# Patient Record
Sex: FEMALE | Race: WHITE
[De-identification: names, ages, dates, MRNs, and addresses within clinical notes are randomized per-mention and may not be internally consistent; named-entity substitution may affect disease eponyms.]

---

## 2017-06-03 ENCOUNTER — HEALTH MAINTENANCE LETTER (OUTPATIENT)
Age: 47
End: 2017-06-03

## 2019-09-29 ENCOUNTER — HEALTH MAINTENANCE LETTER (OUTPATIENT)
Age: 49
End: 2019-09-29

## 2021-01-14 ENCOUNTER — HEALTH MAINTENANCE LETTER (OUTPATIENT)
Age: 51
End: 2021-01-14

## 2021-03-14 ENCOUNTER — HEALTH MAINTENANCE LETTER (OUTPATIENT)
Age: 51
End: 2021-03-14

## 2021-05-10 ENCOUNTER — HOSPITAL ENCOUNTER (EMERGENCY)
Facility: CLINIC | Age: 51
Discharge: HOME | End: 2021-05-10
Payer: COMMERCIAL

## 2021-05-10 VITALS
SYSTOLIC BLOOD PRESSURE: 182 MMHG | OXYGEN SATURATION: 98 % | TEMPERATURE: 98.2 F | WEIGHT: 200 LBS | RESPIRATION RATE: 18 BRPM | HEART RATE: 66 BPM | BODY MASS INDEX: 35.44 KG/M2 | HEIGHT: 63 IN | DIASTOLIC BLOOD PRESSURE: 84 MMHG

## 2021-05-10 ASSESSMENT — MIFFLIN-ST. JEOR: SCORE: 1486.32

## 2021-05-10 NOTE — ED TRIAGE NOTES
Hx of migraines and hardware in neck from previous surgery. Now having severe migraine and neck pain, took Imitrex and Zofran without relief.

## 2021-08-29 ENCOUNTER — HEALTH MAINTENANCE LETTER (OUTPATIENT)
Age: 51
End: 2021-08-29

## 2021-10-24 ENCOUNTER — HEALTH MAINTENANCE LETTER (OUTPATIENT)
Age: 51
End: 2021-10-24

## 2022-01-30 ENCOUNTER — MYC REFILL (OUTPATIENT)
Dept: FAMILY MEDICINE | Facility: CLINIC | Age: 52
End: 2022-01-30
Payer: COMMERCIAL

## 2022-01-30 DIAGNOSIS — B96.89 BACTERIAL VAGINOSIS: ICD-10-CM

## 2022-01-30 DIAGNOSIS — N76.0 BACTERIAL VAGINOSIS: ICD-10-CM

## 2022-01-31 RX ORDER — METRONIDAZOLE 500 MG/1
500 TABLET ORAL 3 TIMES DAILY
Qty: 21 TABLET | Refills: 0 | OUTPATIENT
Start: 2022-01-31

## 2022-01-31 RX ORDER — METRONIDAZOLE 7.5 MG/G
GEL VAGINAL
Qty: 70 G | Refills: 0 | OUTPATIENT
Start: 2022-01-31

## 2022-02-13 ENCOUNTER — HEALTH MAINTENANCE LETTER (OUTPATIENT)
Age: 52
End: 2022-02-13

## 2022-04-10 ENCOUNTER — HEALTH MAINTENANCE LETTER (OUTPATIENT)
Age: 52
End: 2022-04-10

## 2022-10-15 ENCOUNTER — HEALTH MAINTENANCE LETTER (OUTPATIENT)
Age: 52
End: 2022-10-15

## 2023-03-26 ENCOUNTER — HEALTH MAINTENANCE LETTER (OUTPATIENT)
Age: 53
End: 2023-03-26

## 2023-03-29 ENCOUNTER — MYC REFILL (OUTPATIENT)
Dept: FAMILY MEDICINE | Facility: CLINIC | Age: 53
End: 2023-03-29
Payer: COMMERCIAL

## 2023-03-29 DIAGNOSIS — N76.0 BACTERIAL VAGINOSIS: ICD-10-CM

## 2023-03-29 DIAGNOSIS — F43.22 ADJUSTMENT DISORDER WITH ANXIETY: ICD-10-CM

## 2023-03-29 DIAGNOSIS — B96.89 BACTERIAL VAGINOSIS: ICD-10-CM

## 2023-03-29 DIAGNOSIS — G47.00 INSOMNIA: ICD-10-CM

## 2023-03-29 RX ORDER — METRONIDAZOLE 500 MG/1
500 TABLET ORAL 3 TIMES DAILY
Qty: 21 TABLET | Refills: 0 | Status: CANCELLED | OUTPATIENT
Start: 2023-03-29

## 2023-03-29 RX ORDER — METRONIDAZOLE 7.5 MG/G
GEL VAGINAL
Qty: 70 G | Refills: 0 | Status: CANCELLED | OUTPATIENT
Start: 2023-03-29

## 2023-03-29 RX ORDER — LORAZEPAM 0.5 MG/1
1 TABLET ORAL 2 TIMES DAILY PRN
Qty: 30 TABLET | Refills: 0 | Status: CANCELLED | OUTPATIENT
Start: 2023-03-29

## 2023-05-27 ENCOUNTER — HEALTH MAINTENANCE LETTER (OUTPATIENT)
Age: 53
End: 2023-05-27

## 2024-01-07 ENCOUNTER — HEALTH MAINTENANCE LETTER (OUTPATIENT)
Age: 54
End: 2024-01-07

## 2024-05-26 ENCOUNTER — HEALTH MAINTENANCE LETTER (OUTPATIENT)
Age: 54
End: 2024-05-26

## 2025-02-01 ENCOUNTER — HOSPITAL ENCOUNTER (EMERGENCY)
Facility: CLINIC | Age: 55
Discharge: HOME | End: 2025-02-01
Attending: EMERGENCY MEDICINE | Admitting: EMERGENCY MEDICINE
Payer: COMMERCIAL

## 2025-02-01 ENCOUNTER — APPOINTMENT (OUTPATIENT)
Dept: ULTRASOUND IMAGING | Facility: CLINIC | Age: 55
End: 2025-02-01
Attending: EMERGENCY MEDICINE
Payer: COMMERCIAL

## 2025-02-01 VITALS
OXYGEN SATURATION: 96 % | DIASTOLIC BLOOD PRESSURE: 80 MMHG | TEMPERATURE: 97.5 F | SYSTOLIC BLOOD PRESSURE: 146 MMHG | HEART RATE: 73 BPM | WEIGHT: 170 LBS | HEIGHT: 63 IN | RESPIRATION RATE: 22 BRPM | BODY MASS INDEX: 30.12 KG/M2

## 2025-02-01 DIAGNOSIS — R07.89 CHEST PAIN, NON-CARDIAC: ICD-10-CM

## 2025-02-01 DIAGNOSIS — R51.9 ACUTE NONINTRACTABLE HEADACHE, UNSPECIFIED HEADACHE TYPE: ICD-10-CM

## 2025-02-01 LAB
ALBUMIN SERPL BCG-MCNC: 3.9 G/DL (ref 3.5–5.2)
ALP SERPL-CCNC: 128 U/L (ref 40–150)
ALT SERPL W P-5'-P-CCNC: 101 U/L (ref 0–50)
ANION GAP SERPL CALCULATED.3IONS-SCNC: 12 MMOL/L (ref 7–15)
AST SERPL W P-5'-P-CCNC: 292 U/L (ref 0–45)
BASOPHILS # BLD AUTO: 0.1 10E3/UL (ref 0–0.2)
BASOPHILS NFR BLD AUTO: 1 %
BILIRUB SERPL-MCNC: 0.4 MG/DL
BUN SERPL-MCNC: 10 MG/DL (ref 6–20)
CALCIUM SERPL-MCNC: 8.7 MG/DL (ref 8.8–10.4)
CHLORIDE SERPL-SCNC: 99 MMOL/L (ref 98–107)
CREAT SERPL-MCNC: 0.74 MG/DL (ref 0.51–0.95)
D DIMER PPP FEU-MCNC: <0.27 UG/ML FEU (ref 0–0.5)
EGFRCR SERPLBLD CKD-EPI 2021: >90 ML/MIN/1.73M2
EOSINOPHIL # BLD AUTO: 0.2 10E3/UL (ref 0–0.7)
EOSINOPHIL NFR BLD AUTO: 2 %
ERYTHROCYTE [DISTWIDTH] IN BLOOD BY AUTOMATED COUNT: 13.1 % (ref 10–15)
GLUCOSE SERPL-MCNC: 132 MG/DL (ref 70–99)
HCO3 SERPL-SCNC: 26 MMOL/L (ref 22–29)
HCT VFR BLD AUTO: 38.1 % (ref 35–47)
HGB BLD-MCNC: 12.5 G/DL (ref 11.7–15.7)
HOLD SPECIMEN: NORMAL
IMM GRANULOCYTES # BLD: 0 10E3/UL
IMM GRANULOCYTES NFR BLD: 0 %
LIPASE SERPL-CCNC: 42 U/L (ref 13–60)
LYMPHOCYTES # BLD AUTO: 1.7 10E3/UL (ref 0.8–5.3)
LYMPHOCYTES NFR BLD AUTO: 24 %
MCH RBC QN AUTO: 27.3 PG (ref 26.5–33)
MCHC RBC AUTO-ENTMCNC: 32.8 G/DL (ref 31.5–36.5)
MCV RBC AUTO: 83 FL (ref 78–100)
MONOCYTES # BLD AUTO: 0.5 10E3/UL (ref 0–1.3)
MONOCYTES NFR BLD AUTO: 7 %
NEUTROPHILS # BLD AUTO: 4.5 10E3/UL (ref 1.6–8.3)
NEUTROPHILS NFR BLD AUTO: 65 %
NRBC # BLD AUTO: 0 10E3/UL
NRBC BLD AUTO-RTO: 0 /100
PLATELET # BLD AUTO: 280 10E3/UL (ref 150–450)
POTASSIUM SERPL-SCNC: 3.4 MMOL/L (ref 3.4–5.3)
PROT SERPL-MCNC: 6 G/DL (ref 6.4–8.3)
RBC # BLD AUTO: 4.58 10E6/UL (ref 3.8–5.2)
SODIUM SERPL-SCNC: 137 MMOL/L (ref 135–145)
TROPONIN T SERPL HS-MCNC: <6 NG/L
WBC # BLD AUTO: 6.9 10E3/UL (ref 4–11)

## 2025-02-01 PROCEDURE — 85025 COMPLETE CBC W/AUTO DIFF WBC: CPT | Performed by: EMERGENCY MEDICINE

## 2025-02-01 PROCEDURE — 36415 COLL VENOUS BLD VENIPUNCTURE: CPT | Performed by: EMERGENCY MEDICINE

## 2025-02-01 PROCEDURE — 85014 HEMATOCRIT: CPT | Performed by: EMERGENCY MEDICINE

## 2025-02-01 PROCEDURE — 82247 BILIRUBIN TOTAL: CPT | Performed by: EMERGENCY MEDICINE

## 2025-02-01 PROCEDURE — 250N000011 HC RX IP 250 OP 636: Performed by: EMERGENCY MEDICINE

## 2025-02-01 PROCEDURE — 93005 ELECTROCARDIOGRAM TRACING: CPT

## 2025-02-01 PROCEDURE — 85379 FIBRIN DEGRADATION QUANT: CPT | Performed by: EMERGENCY MEDICINE

## 2025-02-01 PROCEDURE — 76705 ECHO EXAM OF ABDOMEN: CPT

## 2025-02-01 PROCEDURE — 82310 ASSAY OF CALCIUM: CPT | Performed by: EMERGENCY MEDICINE

## 2025-02-01 PROCEDURE — 83690 ASSAY OF LIPASE: CPT | Performed by: EMERGENCY MEDICINE

## 2025-02-01 PROCEDURE — 250N000013 HC RX MED GY IP 250 OP 250 PS 637: Performed by: EMERGENCY MEDICINE

## 2025-02-01 PROCEDURE — 84484 ASSAY OF TROPONIN QUANT: CPT | Performed by: EMERGENCY MEDICINE

## 2025-02-01 PROCEDURE — 99285 EMERGENCY DEPT VISIT HI MDM: CPT | Mod: 25

## 2025-02-01 PROCEDURE — 82040 ASSAY OF SERUM ALBUMIN: CPT | Performed by: EMERGENCY MEDICINE

## 2025-02-01 PROCEDURE — 96374 THER/PROPH/DIAG INJ IV PUSH: CPT

## 2025-02-01 RX ORDER — IBUPROFEN 400 MG/1
400 TABLET, FILM COATED ORAL ONCE
Status: COMPLETED | OUTPATIENT
Start: 2025-02-01 | End: 2025-02-01

## 2025-02-01 RX ORDER — MAGNESIUM HYDROXIDE/ALUMINUM HYDROXICE/SIMETHICONE 120; 1200; 1200 MG/30ML; MG/30ML; MG/30ML
30 SUSPENSION ORAL ONCE
Status: COMPLETED | OUTPATIENT
Start: 2025-02-01 | End: 2025-02-01

## 2025-02-01 RX ORDER — MORPHINE SULFATE 4 MG/ML
4 INJECTION, SOLUTION INTRAMUSCULAR; INTRAVENOUS ONCE
Status: DISCONTINUED | OUTPATIENT
Start: 2025-02-01 | End: 2025-02-01

## 2025-02-01 RX ORDER — METOCLOPRAMIDE HYDROCHLORIDE 5 MG/ML
5 INJECTION INTRAMUSCULAR; INTRAVENOUS ONCE
Status: COMPLETED | OUTPATIENT
Start: 2025-02-01 | End: 2025-02-01

## 2025-02-01 RX ADMIN — ALUMINUM HYDROXIDE, MAGNESIUM HYDROXIDE, AND SIMETHICONE 30 ML: 200; 200; 20 SUSPENSION ORAL at 17:44

## 2025-02-01 RX ADMIN — METOCLOPRAMIDE 5 MG: 5 INJECTION, SOLUTION INTRAMUSCULAR; INTRAVENOUS at 17:42

## 2025-02-01 RX ADMIN — IBUPROFEN 400 MG: 400 TABLET, FILM COATED ORAL at 15:36

## 2025-02-01 ASSESSMENT — ACTIVITIES OF DAILY LIVING (ADL)
ADLS_ACUITY_SCORE: 41

## 2025-02-01 ASSESSMENT — COLUMBIA-SUICIDE SEVERITY RATING SCALE - C-SSRS
1. IN THE PAST MONTH, HAVE YOU WISHED YOU WERE DEAD OR WISHED YOU COULD GO TO SLEEP AND NOT WAKE UP?: NO
6. HAVE YOU EVER DONE ANYTHING, STARTED TO DO ANYTHING, OR PREPARED TO DO ANYTHING TO END YOUR LIFE?: NO
2. HAVE YOU ACTUALLY HAD ANY THOUGHTS OF KILLING YOURSELF IN THE PAST MONTH?: NO

## 2025-02-01 NOTE — ED TRIAGE NOTES
Patient presents to the ER via EMS. Per report, patient was at work today when around 1230 had onset of chest pain that wraps around her chest. 10/10 pain. 324 of Asprin and 3 nitroglycerin given in route. Blood sugar 110. /90 and then 150/80 after the 3 doses of nitroglycerin. No IV access

## 2025-02-01 NOTE — ED PROVIDER NOTES
"  Emergency Department Note      History of Present Illness     Chief Complaint   Chest Pain      HPI   Natty Bateman is a 54 year old female history of type 2 diabetes, occipital neuralgia, presented today with chest pain.  States that she was at a, where she works as a .  About 2-1/2 hours ago, she developed substernal left-sided chest pain that wrapped around her chest.  She states that it felt like her bra was too tight.  The pain persisted, and so she called 911.  She was given 324 of aspirin and 3 nitroglycerin and route.  Her pain is better, but still waxes and wanes.  She has some mild shortness of breath, as well as nausea.  She does have a headache as well, which started after taking the nitroglycerin.  She has history of a labral tear in the left hip, and feels that the pain is worse today than it has been.  She has been having bilateral leg cramps which she has attributed to the rosuvastatin.  No leg swelling.     Independent Historian   None    Review of External Notes   Recent relevant notes.    Past Medical History     Medical History and Problem List   DDD   Depression   Insomonia   Leiomyoma of uterus   Migraine headaches   Obesity  Type 2 diabetes mellitus  Hyperlipidemia   Anxiety      Medications   Acetaminophen-codeine   Celexa   Relpax   Ativan  Flagyl  Metrogel  Compazine   Seroquel  Zocor  Topamax      Surgical History   History repair rotator cuff, acute  Tonsillectomy  Tubal ligation   Total abdominal hysterectomy      Physical Exam     Patient Vitals for the past 24 hrs:   BP Temp Temp src Pulse Resp SpO2 Height Weight   02/01/25 1436 (!) 140/82 (!) 96.5  F (35.8  C) Temporal 76 16 97 % 1.6 m (5' 3\") 77.1 kg (170 lb)     Physical Exam  General: alert, lying comfortably on gurney  HENT: mucous membranes moist  CV: regular rate, regular rhythm  Resp: normal effort, clear throughout, no crackles or wheezing  GI: abdomen is soft, but positive Torrez sign.    MSK: no bony " tenderness  Skin: appropriately warm and dry  Extremities: no edema, calves non-tender  Neuro: alert, clear speech, oriented  Psych: normal mood and affect      Diagnostics     Lab Results   Labs Ordered and Resulted from Time of ED Arrival to Time of ED Departure   COMPREHENSIVE METABOLIC PANEL - Abnormal       Result Value    Sodium 137      Potassium 3.4      Carbon Dioxide (CO2) 26      Anion Gap 12      Urea Nitrogen 10.0      Creatinine 0.74      GFR Estimate >90      Calcium 8.7 (*)     Chloride 99      Glucose 132 (*)     Alkaline Phosphatase 128       (*)      (*)     Protein Total 6.0 (*)     Albumin 3.9      Bilirubin Total 0.4     LIPASE - Normal    Lipase 42     TROPONIN T, HIGH SENSITIVITY - Normal    Troponin T, High Sensitivity <6     D DIMER QUANTITATIVE - Normal    D-Dimer Quantitative <0.27     CBC WITH PLATELETS AND DIFFERENTIAL    WBC Count 6.9      RBC Count 4.58      Hemoglobin 12.5      Hematocrit 38.1      MCV 83      MCH 27.3      MCHC 32.8      RDW 13.1      Platelet Count 280      % Neutrophils 65      % Lymphocytes 24      % Monocytes 7      % Eosinophils 2      % Basophils 1      % Immature Granulocytes 0      NRBCs per 100 WBC 0      Absolute Neutrophils 4.5      Absolute Lymphocytes 1.7      Absolute Monocytes 0.5      Absolute Eosinophils 0.2      Absolute Basophils 0.1      Absolute Immature Granulocytes 0.0      Absolute NRBCs 0.0         Imaging   US Abdomen Limited (RUQ)   Final Result   IMPRESSION:   1.  Extrahepatic biliary ductal dilation, of uncertain clinical significance but recommend correlation with bilirubin. Could also consider further evaluation with MRCP if clinically indicated.   2.  No evidence of cholelithiasis or acute cholecystitis.                EKG   ECG results from 02/01/25   EKG 12-lead, tracing only     Value    Systolic Blood Pressure     Diastolic Blood Pressure     Ventricular Rate 73    Atrial Rate 73    IA Interval 186    QRS Duration 82         QTc 497    P Axis 55    R AXIS 15    T Axis 52    Interpretation ECG      Sinus rhythm  Prolonged QT  Abnormal ECG  When compared with ECG of 06-Sep-2011 19:41,  MANUAL COMPARISON REQUIRED PREVIOUS ECG IS INCOMPATIBLE           Independent Interpretation   None    ED Course      Medications Administered   Medications   morphine (PF) injection 4 mg (has no administration in time range)   ibuprofen (ADVIL/MOTRIN) tablet 400 mg (400 mg Oral $Given 2/1/25 9555)       Procedures   Procedures     Discussion of Management   None    ED Course        Additional Documentation  None    Medical Decision Making / Diagnosis     CMS Diagnoses: None    MIPS       None    [facility]   Nattyjuanito Bateman is a 54 year old female history of type 2 diabetes, chronic left hip pain, who presents today with chest pain that started about at rest.  On exam, the patient is slightly hypertensive, but otherwise has normal vitals.  EKG is negative for signs of acute ischemia.  Overall, history is not strongly consistent with angina, given that it started at rest, and did not completely resolved with nitroglycerin.  Troponin obtained greater than 2 hours after the onset of pain is undetectable.  At this point, the patient can be ruled out for acute coronary syndrome.  Differential diagnosis includes pulmonary embolism.  D-dimer is pending.  I considered alternative causes for pain, including cholecystitis.  She did have right upper quadrant tenderness, therefore right upper quadrant ultrasound was obtained.  This is negative for signs of gallstones, or hepatomegaly.  The patient was noted to have elevated liver enzymes uncertain etiology.  She is on Tylenol 3, but take that as directed.  She is not taking additional acetaminophen products nor does she use alcohol.  D-dimer was negative, which rules out PE in this otherwise low risk patient.  I recommended outpatient follow-up with PCP to recheck LFTs.  Return to the ED for increasing  pain, new symptoms such as shortness of breath, fever, or for any other concerns.    Disposition   The patient was discharged.     Diagnosis     ICD-10-CM    1. Chest pain, non-cardiac  R07.89       2. Acute nonintractable headache, unspecified headache type  R51.9              MD Pamella Griffith Tracy Lynn, MD  02/02/25 0704

## 2025-02-02 LAB
ATRIAL RATE - MUSE: 73 BPM
DIASTOLIC BLOOD PRESSURE - MUSE: NORMAL MMHG
INTERPRETATION ECG - MUSE: NORMAL
P AXIS - MUSE: 55 DEGREES
PR INTERVAL - MUSE: 186 MS
QRS DURATION - MUSE: 82 MS
QT - MUSE: 452 MS
QTC - MUSE: 497 MS
R AXIS - MUSE: 15 DEGREES
SYSTOLIC BLOOD PRESSURE - MUSE: NORMAL MMHG
T AXIS - MUSE: 52 DEGREES
VENTRICULAR RATE- MUSE: 73 BPM

## 2025-02-22 ENCOUNTER — HEALTH MAINTENANCE LETTER (OUTPATIENT)
Age: 55
End: 2025-02-22

## 2025-06-08 ENCOUNTER — HOSPITAL ENCOUNTER (INPATIENT)
Facility: CLINIC | Age: 55
LOS: 4 days | Discharge: HOME | End: 2025-06-12
Attending: STUDENT IN AN ORGANIZED HEALTH CARE EDUCATION/TRAINING PROGRAM
Payer: COMMERCIAL

## 2025-06-08 DIAGNOSIS — K80.50 CHOLEDOCHOLITHIASIS: Primary | ICD-10-CM

## 2025-06-08 DIAGNOSIS — K83.09 CHOLANGITIS (H): ICD-10-CM

## 2025-06-08 DIAGNOSIS — G89.18 POSTOPERATIVE PAIN: ICD-10-CM

## 2025-06-08 LAB — GLUCOSE BLDC GLUCOMTR-MCNC: 114 MG/DL (ref 70–99)

## 2025-06-08 PROCEDURE — 120N000013 HC R&B IMCU

## 2025-06-08 PROCEDURE — 250N000011 HC RX IP 250 OP 636: Mod: JZ | Performed by: HOSPITALIST

## 2025-06-08 PROCEDURE — 250N000013 HC RX MED GY IP 250 OP 250 PS 637: Performed by: HOSPITALIST

## 2025-06-08 PROCEDURE — 99223 1ST HOSP IP/OBS HIGH 75: CPT | Performed by: HOSPITALIST

## 2025-06-08 RX ORDER — LIDOCAINE 40 MG/G
CREAM TOPICAL
Status: DISCONTINUED | OUTPATIENT
Start: 2025-06-08 | End: 2025-06-12 | Stop reason: HOSPADM

## 2025-06-08 RX ORDER — QUETIAPINE FUMARATE 25 MG/1
25 TABLET, FILM COATED ORAL ONCE
Status: COMPLETED | OUTPATIENT
Start: 2025-06-09 | End: 2025-06-08

## 2025-06-08 RX ORDER — AMOXICILLIN 250 MG
2 CAPSULE ORAL 2 TIMES DAILY PRN
Status: DISCONTINUED | OUTPATIENT
Start: 2025-06-08 | End: 2025-06-12 | Stop reason: HOSPADM

## 2025-06-08 RX ORDER — ACETAMINOPHEN 650 MG/1
650 SUPPOSITORY RECTAL EVERY 4 HOURS PRN
Status: DISCONTINUED | OUTPATIENT
Start: 2025-06-08 | End: 2025-06-12 | Stop reason: HOSPADM

## 2025-06-08 RX ORDER — NICOTINE POLACRILEX 4 MG
15-30 LOZENGE BUCCAL
Status: DISCONTINUED | OUTPATIENT
Start: 2025-06-08 | End: 2025-06-09

## 2025-06-08 RX ORDER — CEFEPIME HYDROCHLORIDE 2 G/1
2 INJECTION, POWDER, FOR SOLUTION INTRAVENOUS EVERY 12 HOURS
Status: DISCONTINUED | OUTPATIENT
Start: 2025-06-09 | End: 2025-06-09

## 2025-06-08 RX ORDER — NALOXONE HYDROCHLORIDE 0.4 MG/ML
0.4 INJECTION, SOLUTION INTRAMUSCULAR; INTRAVENOUS; SUBCUTANEOUS
Status: DISCONTINUED | OUTPATIENT
Start: 2025-06-08 | End: 2025-06-12 | Stop reason: HOSPADM

## 2025-06-08 RX ORDER — HYDROMORPHONE HYDROCHLORIDE 1 MG/ML
0.5 INJECTION, SOLUTION INTRAMUSCULAR; INTRAVENOUS; SUBCUTANEOUS
Status: DISCONTINUED | OUTPATIENT
Start: 2025-06-08 | End: 2025-06-12 | Stop reason: HOSPADM

## 2025-06-08 RX ORDER — CALCIUM CARBONATE 500 MG/1
1000 TABLET, CHEWABLE ORAL 4 TIMES DAILY PRN
Status: DISCONTINUED | OUTPATIENT
Start: 2025-06-08 | End: 2025-06-12 | Stop reason: HOSPADM

## 2025-06-08 RX ORDER — PROCHLORPERAZINE MALEATE 10 MG
10 TABLET ORAL EVERY 6 HOURS PRN
Status: DISCONTINUED | OUTPATIENT
Start: 2025-06-08 | End: 2025-06-12 | Stop reason: HOSPADM

## 2025-06-08 RX ORDER — DEXTROSE MONOHYDRATE 25 G/50ML
25-50 INJECTION, SOLUTION INTRAVENOUS
Status: DISCONTINUED | OUTPATIENT
Start: 2025-06-08 | End: 2025-06-09

## 2025-06-08 RX ORDER — AMOXICILLIN 250 MG
1 CAPSULE ORAL 2 TIMES DAILY PRN
Status: DISCONTINUED | OUTPATIENT
Start: 2025-06-08 | End: 2025-06-12 | Stop reason: HOSPADM

## 2025-06-08 RX ORDER — HYDROMORPHONE HYDROCHLORIDE 1 MG/ML
0.3 INJECTION, SOLUTION INTRAMUSCULAR; INTRAVENOUS; SUBCUTANEOUS
Status: DISCONTINUED | OUTPATIENT
Start: 2025-06-08 | End: 2025-06-12 | Stop reason: HOSPADM

## 2025-06-08 RX ORDER — ACETAMINOPHEN 325 MG/1
650 TABLET ORAL EVERY 4 HOURS PRN
Status: DISCONTINUED | OUTPATIENT
Start: 2025-06-08 | End: 2025-06-12 | Stop reason: HOSPADM

## 2025-06-08 RX ORDER — ONDANSETRON 4 MG/1
4 TABLET, ORALLY DISINTEGRATING ORAL EVERY 6 HOURS PRN
Status: DISCONTINUED | OUTPATIENT
Start: 2025-06-08 | End: 2025-06-12 | Stop reason: HOSPADM

## 2025-06-08 RX ORDER — SODIUM CHLORIDE AND POTASSIUM CHLORIDE 150; 900 MG/100ML; MG/100ML
INJECTION, SOLUTION INTRAVENOUS CONTINUOUS
Status: DISCONTINUED | OUTPATIENT
Start: 2025-06-09 | End: 2025-06-09

## 2025-06-08 RX ORDER — ONDANSETRON 2 MG/ML
4 INJECTION INTRAMUSCULAR; INTRAVENOUS EVERY 6 HOURS PRN
Status: DISCONTINUED | OUTPATIENT
Start: 2025-06-08 | End: 2025-06-12 | Stop reason: HOSPADM

## 2025-06-08 RX ORDER — NALOXONE HYDROCHLORIDE 0.4 MG/ML
0.2 INJECTION, SOLUTION INTRAMUSCULAR; INTRAVENOUS; SUBCUTANEOUS
Status: DISCONTINUED | OUTPATIENT
Start: 2025-06-08 | End: 2025-06-12 | Stop reason: HOSPADM

## 2025-06-08 RX ORDER — METRONIDAZOLE 500 MG/100ML
500 INJECTION, SOLUTION INTRAVENOUS EVERY 12 HOURS
Status: DISCONTINUED | OUTPATIENT
Start: 2025-06-09 | End: 2025-06-11

## 2025-06-08 RX ADMIN — HYDROMORPHONE HYDROCHLORIDE 0.5 MG: 1 INJECTION, SOLUTION INTRAMUSCULAR; INTRAVENOUS; SUBCUTANEOUS at 23:54

## 2025-06-08 RX ADMIN — QUETIAPINE FUMARATE 25 MG: 25 TABLET ORAL at 23:54

## 2025-06-08 RX ADMIN — SODIUM CHLORIDE AND POTASSIUM CHLORIDE: .9; .15 SOLUTION INTRAVENOUS at 23:55

## 2025-06-08 ASSESSMENT — ACTIVITIES OF DAILY LIVING (ADL): ADLS_ACUITY_SCORE: 41

## 2025-06-09 ENCOUNTER — HOSPITAL ENCOUNTER (OUTPATIENT)
Facility: CLINIC | Age: 55
End: 2025-06-09
Attending: SURGERY | Admitting: SURGERY
Payer: COMMERCIAL

## 2025-06-09 ENCOUNTER — ANESTHESIA (OUTPATIENT)
Dept: SURGERY | Facility: CLINIC | Age: 55
End: 2025-06-09
Payer: COMMERCIAL

## 2025-06-09 ENCOUNTER — ANESTHESIA EVENT (OUTPATIENT)
Dept: SURGERY | Facility: CLINIC | Age: 55
End: 2025-06-09
Payer: COMMERCIAL

## 2025-06-09 ENCOUNTER — APPOINTMENT (OUTPATIENT)
Dept: GENERAL RADIOLOGY | Facility: CLINIC | Age: 55
DRG: 854 | End: 2025-06-09
Attending: STUDENT IN AN ORGANIZED HEALTH CARE EDUCATION/TRAINING PROGRAM
Payer: COMMERCIAL

## 2025-06-09 LAB
ALBUMIN SERPL BCG-MCNC: 3.1 G/DL (ref 3.5–5.2)
ALP SERPL-CCNC: 82 U/L (ref 40–150)
ALT SERPL W P-5'-P-CCNC: 160 U/L (ref 0–50)
ANION GAP SERPL CALCULATED.3IONS-SCNC: 11 MMOL/L (ref 7–15)
ANION GAP SERPL CALCULATED.3IONS-SCNC: 16 MMOL/L (ref 7–15)
AST SERPL W P-5'-P-CCNC: 171 U/L (ref 0–45)
BASOPHILS # BLD AUTO: 0.1 10E3/UL (ref 0–0.2)
BASOPHILS NFR BLD AUTO: 0 %
BILIRUB SERPL-MCNC: 1.9 MG/DL
BUN SERPL-MCNC: 11.2 MG/DL (ref 6–20)
BUN SERPL-MCNC: 11.7 MG/DL (ref 6–20)
CALCIUM SERPL-MCNC: 7.5 MG/DL (ref 8.8–10.4)
CALCIUM SERPL-MCNC: 7.5 MG/DL (ref 8.8–10.4)
CHLORIDE SERPL-SCNC: 102 MMOL/L (ref 98–107)
CHLORIDE SERPL-SCNC: 102 MMOL/L (ref 98–107)
CREAT SERPL-MCNC: 0.6 MG/DL (ref 0.51–0.95)
CREAT SERPL-MCNC: 0.76 MG/DL (ref 0.51–0.95)
EGFRCR SERPLBLD CKD-EPI 2021: >90 ML/MIN/1.73M2
EGFRCR SERPLBLD CKD-EPI 2021: >90 ML/MIN/1.73M2
EOSINOPHIL # BLD AUTO: 0 10E3/UL (ref 0–0.7)
EOSINOPHIL NFR BLD AUTO: 0 %
ERCP: NORMAL
ERYTHROCYTE [DISTWIDTH] IN BLOOD BY AUTOMATED COUNT: 12.8 % (ref 10–15)
EST. AVERAGE GLUCOSE BLD GHB EST-MCNC: 131 MG/DL
GLUCOSE BLDC GLUCOMTR-MCNC: 115 MG/DL (ref 70–99)
GLUCOSE BLDC GLUCOMTR-MCNC: 152 MG/DL (ref 70–99)
GLUCOSE BLDC GLUCOMTR-MCNC: 159 MG/DL (ref 70–99)
GLUCOSE BLDC GLUCOMTR-MCNC: 84 MG/DL (ref 70–99)
GLUCOSE BLDC GLUCOMTR-MCNC: 89 MG/DL (ref 70–99)
GLUCOSE BLDC GLUCOMTR-MCNC: 98 MG/DL (ref 70–99)
GLUCOSE SERPL-MCNC: 144 MG/DL (ref 70–99)
GLUCOSE SERPL-MCNC: 98 MG/DL (ref 70–99)
HBA1C MFR BLD: 6.2 %
HCO3 SERPL-SCNC: 18 MMOL/L (ref 22–29)
HCO3 SERPL-SCNC: 20 MMOL/L (ref 22–29)
HCT VFR BLD AUTO: 35.4 % (ref 35–47)
HGB BLD-MCNC: 11.8 G/DL (ref 11.7–15.7)
IMM GRANULOCYTES # BLD: 0.2 10E3/UL
IMM GRANULOCYTES NFR BLD: 1 %
LACTATE SERPL-SCNC: 1.5 MMOL/L (ref 0.7–2)
LYMPHOCYTES # BLD AUTO: 0.6 10E3/UL (ref 0.8–5.3)
LYMPHOCYTES NFR BLD AUTO: 4 %
MCH RBC QN AUTO: 27.8 PG (ref 26.5–33)
MCHC RBC AUTO-ENTMCNC: 33.3 G/DL (ref 31.5–36.5)
MCV RBC AUTO: 83 FL (ref 78–100)
MONOCYTES # BLD AUTO: 0.5 10E3/UL (ref 0–1.3)
MONOCYTES NFR BLD AUTO: 3 %
NEUTROPHILS # BLD AUTO: 15.7 10E3/UL (ref 1.6–8.3)
NEUTROPHILS NFR BLD AUTO: 92 %
NRBC # BLD AUTO: 0 10E3/UL
NRBC BLD AUTO-RTO: 0 /100
PLATELET # BLD AUTO: 261 10E3/UL (ref 150–450)
POTASSIUM SERPL-SCNC: 3.7 MMOL/L (ref 3.4–5.3)
POTASSIUM SERPL-SCNC: 4.1 MMOL/L (ref 3.4–5.3)
PROT SERPL-MCNC: 5.1 G/DL (ref 6.4–8.3)
RBC # BLD AUTO: 4.25 10E6/UL (ref 3.8–5.2)
SODIUM SERPL-SCNC: 133 MMOL/L (ref 135–145)
SODIUM SERPL-SCNC: 136 MMOL/L (ref 135–145)
WBC # BLD AUTO: 17 10E3/UL (ref 4–11)

## 2025-06-09 PROCEDURE — 83036 HEMOGLOBIN GLYCOSYLATED A1C: CPT | Performed by: HOSPITALIST

## 2025-06-09 PROCEDURE — 0FC98ZZ EXTIRPATION OF MATTER FROM COMMON BILE DUCT, VIA NATURAL OR ARTIFICIAL OPENING ENDOSCOPIC: ICD-10-PCS | Performed by: INTERNAL MEDICINE

## 2025-06-09 PROCEDURE — 250N000011 HC RX IP 250 OP 636: Performed by: HOSPITALIST

## 2025-06-09 PROCEDURE — 999N000128 HC STATISTIC PERIPHERAL IV START W/O US GUIDANCE

## 2025-06-09 PROCEDURE — 250N000013 HC RX MED GY IP 250 OP 250 PS 637: Performed by: INTERNAL MEDICINE

## 2025-06-09 PROCEDURE — 82310 ASSAY OF CALCIUM: CPT | Performed by: INTERNAL MEDICINE

## 2025-06-09 PROCEDURE — 99233 SBSQ HOSP IP/OBS HIGH 50: CPT | Performed by: INTERNAL MEDICINE

## 2025-06-09 PROCEDURE — 83605 ASSAY OF LACTIC ACID: CPT | Performed by: HOSPITALIST

## 2025-06-09 PROCEDURE — 36415 COLL VENOUS BLD VENIPUNCTURE: CPT | Performed by: HOSPITALIST

## 2025-06-09 PROCEDURE — 999N000141 HC STATISTIC PRE-PROCEDURE NURSING ASSESSMENT: Performed by: INTERNAL MEDICINE

## 2025-06-09 PROCEDURE — 710N000009 HC RECOVERY PHASE 1, LEVEL 1, PER MIN: Performed by: INTERNAL MEDICINE

## 2025-06-09 PROCEDURE — 36415 COLL VENOUS BLD VENIPUNCTURE: CPT | Performed by: INTERNAL MEDICINE

## 2025-06-09 PROCEDURE — 250N000009 HC RX 250: Performed by: INTERNAL MEDICINE

## 2025-06-09 PROCEDURE — 258N000003 HC RX IP 258 OP 636: Performed by: INTERNAL MEDICINE

## 2025-06-09 PROCEDURE — 272N000001 HC OR GENERAL SUPPLY STERILE: Performed by: INTERNAL MEDICINE

## 2025-06-09 PROCEDURE — 258N000003 HC RX IP 258 OP 636

## 2025-06-09 PROCEDURE — 250N000013 HC RX MED GY IP 250 OP 250 PS 637: Performed by: HOSPITALIST

## 2025-06-09 PROCEDURE — 255N000002 HC RX 255 OP 636: Performed by: INTERNAL MEDICINE

## 2025-06-09 PROCEDURE — 250N000009 HC RX 250

## 2025-06-09 PROCEDURE — 999N000179 XR SURGERY CARM FLUORO LESS THAN 5 MIN W STILLS

## 2025-06-09 PROCEDURE — 250N000011 HC RX IP 250 OP 636: Mod: JZ | Performed by: INTERNAL MEDICINE

## 2025-06-09 PROCEDURE — 250N000012 HC RX MED GY IP 250 OP 636 PS 637: Performed by: INTERNAL MEDICINE

## 2025-06-09 PROCEDURE — 250N000011 HC RX IP 250 OP 636

## 2025-06-09 PROCEDURE — 80053 COMPREHEN METABOLIC PANEL: CPT | Performed by: HOSPITALIST

## 2025-06-09 PROCEDURE — C1769 GUIDE WIRE: HCPCS | Performed by: INTERNAL MEDICINE

## 2025-06-09 PROCEDURE — 999N000040 HC STATISTIC CONSULT NO CHARGE VASC ACCESS

## 2025-06-09 PROCEDURE — 370N000017 HC ANESTHESIA TECHNICAL FEE, PER MIN: Performed by: INTERNAL MEDICINE

## 2025-06-09 PROCEDURE — C2617 STENT, NON-COR, TEM W/O DEL: HCPCS | Performed by: INTERNAL MEDICINE

## 2025-06-09 PROCEDURE — 120N000013 HC R&B IMCU

## 2025-06-09 PROCEDURE — 0F798DZ DILATION OF COMMON BILE DUCT WITH INTRALUMINAL DEVICE, VIA NATURAL OR ARTIFICIAL OPENING ENDOSCOPIC: ICD-10-PCS | Performed by: INTERNAL MEDICINE

## 2025-06-09 PROCEDURE — 360N000083 HC SURGERY LEVEL 3 W/ FLUORO, PER MIN: Performed by: INTERNAL MEDICINE

## 2025-06-09 PROCEDURE — 85004 AUTOMATED DIFF WBC COUNT: CPT | Performed by: HOSPITALIST

## 2025-06-09 DEVICE — COTTON-LEUNG BILIARY STENT
Type: IMPLANTABLE DEVICE | Site: BILE DUCT | Status: FUNCTIONAL
Brand: COTTON-LEUNG

## 2025-06-09 RX ORDER — OMEPRAZOLE 40 MG/1
40 CAPSULE, DELAYED RELEASE ORAL 2 TIMES DAILY
COMMUNITY

## 2025-06-09 RX ORDER — FLUMAZENIL 0.1 MG/ML
0.2 INJECTION, SOLUTION INTRAVENOUS
Status: CANCELLED | OUTPATIENT
Start: 2025-06-09 | End: 2025-06-10

## 2025-06-09 RX ORDER — QUETIAPINE FUMARATE 50 MG/1
50 TABLET, FILM COATED ORAL AT BEDTIME
COMMUNITY

## 2025-06-09 RX ORDER — FENTANYL CITRATE 0.05 MG/ML
50 INJECTION, SOLUTION INTRAMUSCULAR; INTRAVENOUS EVERY 5 MIN PRN
Status: DISCONTINUED | OUTPATIENT
Start: 2025-06-09 | End: 2025-06-09 | Stop reason: HOSPADM

## 2025-06-09 RX ORDER — PANTOPRAZOLE SODIUM 40 MG/1
40 TABLET, DELAYED RELEASE ORAL
Status: DISCONTINUED | OUTPATIENT
Start: 2025-06-09 | End: 2025-06-12 | Stop reason: HOSPADM

## 2025-06-09 RX ORDER — OMEPRAZOLE 20 MG/1
40 CAPSULE, DELAYED RELEASE ORAL 2 TIMES DAILY
Status: DISCONTINUED | OUTPATIENT
Start: 2025-06-09 | End: 2025-06-09 | Stop reason: ALTCHOICE

## 2025-06-09 RX ORDER — DICYCLOMINE HYDROCHLORIDE 10 MG/1
10 CAPSULE ORAL
COMMUNITY

## 2025-06-09 RX ORDER — HYDROXYZINE HYDROCHLORIDE 25 MG/1
50 TABLET, FILM COATED ORAL SEE ADMIN INSTRUCTIONS
Status: DISCONTINUED | OUTPATIENT
Start: 2025-06-09 | End: 2025-06-09

## 2025-06-09 RX ORDER — SENNOSIDES 8.6 MG
2 TABLET ORAL AT BEDTIME
COMMUNITY

## 2025-06-09 RX ORDER — THERA TABS 400 MCG
1 TAB ORAL DAILY
Status: DISCONTINUED | OUTPATIENT
Start: 2025-06-09 | End: 2025-06-12 | Stop reason: HOSPADM

## 2025-06-09 RX ORDER — HYDROXYZINE HYDROCHLORIDE 25 MG/1
100 TABLET, FILM COATED ORAL AT BEDTIME
Status: DISCONTINUED | OUTPATIENT
Start: 2025-06-09 | End: 2025-06-12 | Stop reason: HOSPADM

## 2025-06-09 RX ORDER — SODIUM CHLORIDE 9 MG/ML
INJECTION, SOLUTION INTRAVENOUS CONTINUOUS
Status: DISCONTINUED | OUTPATIENT
Start: 2025-06-09 | End: 2025-06-12 | Stop reason: HOSPADM

## 2025-06-09 RX ORDER — ASPIRIN 81 MG/1
81 TABLET, CHEWABLE ORAL DAILY
COMMUNITY

## 2025-06-09 RX ORDER — ONDANSETRON 2 MG/ML
4 INJECTION INTRAMUSCULAR; INTRAVENOUS EVERY 30 MIN PRN
Status: DISCONTINUED | OUTPATIENT
Start: 2025-06-09 | End: 2025-06-09 | Stop reason: HOSPADM

## 2025-06-09 RX ORDER — SODIUM CHLORIDE, SODIUM LACTATE, POTASSIUM CHLORIDE, CALCIUM CHLORIDE 600; 310; 30; 20 MG/100ML; MG/100ML; MG/100ML; MG/100ML
INJECTION, SOLUTION INTRAVENOUS CONTINUOUS
Status: DISCONTINUED | OUTPATIENT
Start: 2025-06-09 | End: 2025-06-09 | Stop reason: HOSPADM

## 2025-06-09 RX ORDER — DEXTROSE MONOHYDRATE 25 G/50ML
25-50 INJECTION, SOLUTION INTRAVENOUS
Status: DISCONTINUED | OUTPATIENT
Start: 2025-06-09 | End: 2025-06-12 | Stop reason: HOSPADM

## 2025-06-09 RX ORDER — VILAZODONE HYDROCHLORIDE 20 MG/1
40 TABLET ORAL EVERY MORNING
Status: DISCONTINUED | OUTPATIENT
Start: 2025-06-10 | End: 2025-06-12 | Stop reason: HOSPADM

## 2025-06-09 RX ORDER — ONDANSETRON 4 MG/1
4 TABLET, ORALLY DISINTEGRATING ORAL EVERY 30 MIN PRN
Status: DISCONTINUED | OUTPATIENT
Start: 2025-06-09 | End: 2025-06-09 | Stop reason: HOSPADM

## 2025-06-09 RX ORDER — OXYCODONE HYDROCHLORIDE 5 MG/1
5 TABLET ORAL EVERY 12 HOURS PRN
Status: ON HOLD | COMMUNITY
End: 2025-06-09

## 2025-06-09 RX ORDER — FENTANYL CITRATE 0.05 MG/ML
25 INJECTION, SOLUTION INTRAMUSCULAR; INTRAVENOUS EVERY 5 MIN PRN
Status: DISCONTINUED | OUTPATIENT
Start: 2025-06-09 | End: 2025-06-09 | Stop reason: HOSPADM

## 2025-06-09 RX ORDER — HYDROMORPHONE HCL IN WATER/PF 6 MG/30 ML
0.4 PATIENT CONTROLLED ANALGESIA SYRINGE INTRAVENOUS EVERY 5 MIN PRN
Status: DISCONTINUED | OUTPATIENT
Start: 2025-06-09 | End: 2025-06-09 | Stop reason: HOSPADM

## 2025-06-09 RX ORDER — METFORMIN HYDROCHLORIDE 750 MG/1
750 TABLET, EXTENDED RELEASE ORAL
COMMUNITY

## 2025-06-09 RX ORDER — ROSUVASTATIN CALCIUM 5 MG/1
5 TABLET, COATED ORAL AT BEDTIME
Status: DISCONTINUED | OUTPATIENT
Start: 2025-06-09 | End: 2025-06-12 | Stop reason: HOSPADM

## 2025-06-09 RX ORDER — ROSUVASTATIN CALCIUM 5 MG/1
5 TABLET, COATED ORAL AT BEDTIME
COMMUNITY

## 2025-06-09 RX ORDER — PROPOFOL 10 MG/ML
INJECTION, EMULSION INTRAVENOUS PRN
Status: DISCONTINUED | OUTPATIENT
Start: 2025-06-09 | End: 2025-06-09

## 2025-06-09 RX ORDER — ONDANSETRON 2 MG/ML
INJECTION INTRAMUSCULAR; INTRAVENOUS PRN
Status: DISCONTINUED | OUTPATIENT
Start: 2025-06-09 | End: 2025-06-09

## 2025-06-09 RX ORDER — HYDROXYZINE HYDROCHLORIDE 25 MG/1
50 TABLET, FILM COATED ORAL 2 TIMES DAILY
Status: DISCONTINUED | OUTPATIENT
Start: 2025-06-09 | End: 2025-06-12 | Stop reason: HOSPADM

## 2025-06-09 RX ORDER — PIPERACILLIN SODIUM, TAZOBACTAM SODIUM 4; .5 G/20ML; G/20ML
4.5 INJECTION, POWDER, LYOPHILIZED, FOR SOLUTION INTRAVENOUS EVERY 6 HOURS
Status: DISCONTINUED | OUTPATIENT
Start: 2025-06-09 | End: 2025-06-09

## 2025-06-09 RX ORDER — PROPOFOL 10 MG/ML
INJECTION, EMULSION INTRAVENOUS CONTINUOUS PRN
Status: DISCONTINUED | OUTPATIENT
Start: 2025-06-09 | End: 2025-06-09

## 2025-06-09 RX ORDER — SUMATRIPTAN 6 MG/.5ML
6 INJECTION, SOLUTION SUBCUTANEOUS ONCE
Status: COMPLETED | OUTPATIENT
Start: 2025-06-09 | End: 2025-06-09

## 2025-06-09 RX ORDER — THERA TABS 400 MCG
1 TAB ORAL DAILY
COMMUNITY

## 2025-06-09 RX ORDER — ESTRADIOL 1 MG/1
1 TABLET ORAL DAILY
Status: DISCONTINUED | OUTPATIENT
Start: 2025-06-10 | End: 2025-06-09

## 2025-06-09 RX ORDER — METHOCARBAMOL 750 MG/1
750 TABLET, FILM COATED ORAL
Status: DISCONTINUED | OUTPATIENT
Start: 2025-06-09 | End: 2025-06-12 | Stop reason: HOSPADM

## 2025-06-09 RX ORDER — ONDANSETRON 4 MG/1
4 TABLET, ORALLY DISINTEGRATING ORAL EVERY 6 HOURS PRN
Status: CANCELLED | OUTPATIENT
Start: 2025-06-09

## 2025-06-09 RX ORDER — CLONIDINE HYDROCHLORIDE 0.1 MG/1
0.2 TABLET ORAL 2 TIMES DAILY
COMMUNITY

## 2025-06-09 RX ORDER — VITAMIN B COMPLEX
25 TABLET ORAL DAILY
COMMUNITY

## 2025-06-09 RX ORDER — SUMATRIPTAN SUCCINATE 100 MG/1
100 TABLET ORAL
COMMUNITY

## 2025-06-09 RX ORDER — LIDOCAINE 40 MG/G
CREAM TOPICAL
Status: CANCELLED | OUTPATIENT
Start: 2025-06-09

## 2025-06-09 RX ORDER — VITAMIN B COMPLEX
25 TABLET ORAL DAILY
Status: DISCONTINUED | OUTPATIENT
Start: 2025-06-09 | End: 2025-06-09

## 2025-06-09 RX ORDER — CEFAZOLIN SODIUM/WATER 2 G/20 ML
SYRINGE (ML) INTRAVENOUS PRN
Status: DISCONTINUED | OUTPATIENT
Start: 2025-06-09 | End: 2025-06-09

## 2025-06-09 RX ORDER — HYDROXYZINE HYDROCHLORIDE 50 MG/1
TABLET, FILM COATED ORAL SEE ADMIN INSTRUCTIONS
COMMUNITY

## 2025-06-09 RX ORDER — ONDANSETRON 2 MG/ML
4 INJECTION INTRAMUSCULAR; INTRAVENOUS EVERY 6 HOURS PRN
Status: CANCELLED | OUTPATIENT
Start: 2025-06-09

## 2025-06-09 RX ORDER — ESTRADIOL 1 MG/1
1 TABLET ORAL DAILY
COMMUNITY

## 2025-06-09 RX ORDER — NALOXONE HYDROCHLORIDE 0.4 MG/ML
0.1 INJECTION, SOLUTION INTRAMUSCULAR; INTRAVENOUS; SUBCUTANEOUS
Status: DISCONTINUED | OUTPATIENT
Start: 2025-06-09 | End: 2025-06-09 | Stop reason: HOSPADM

## 2025-06-09 RX ORDER — METHOCARBAMOL 750 MG/1
750 TABLET, FILM COATED ORAL
COMMUNITY

## 2025-06-09 RX ORDER — VILAZODONE HYDROCHLORIDE 40 MG/1
40 TABLET ORAL EVERY MORNING
COMMUNITY

## 2025-06-09 RX ORDER — PROMETHAZINE HYDROCHLORIDE 12.5 MG/1
12.5 TABLET ORAL 2 TIMES DAILY PRN
COMMUNITY

## 2025-06-09 RX ORDER — LIDOCAINE HYDROCHLORIDE 20 MG/ML
INJECTION, SOLUTION INFILTRATION; PERINEURAL PRN
Status: DISCONTINUED | OUTPATIENT
Start: 2025-06-09 | End: 2025-06-09

## 2025-06-09 RX ORDER — DEXAMETHASONE SODIUM PHOSPHATE 4 MG/ML
INJECTION, SOLUTION INTRA-ARTICULAR; INTRALESIONAL; INTRAMUSCULAR; INTRAVENOUS; SOFT TISSUE PRN
Status: DISCONTINUED | OUTPATIENT
Start: 2025-06-09 | End: 2025-06-09

## 2025-06-09 RX ORDER — CEFEPIME HYDROCHLORIDE 2 G/1
2 INJECTION, POWDER, FOR SOLUTION INTRAVENOUS EVERY 12 HOURS
Status: DISCONTINUED | OUTPATIENT
Start: 2025-06-09 | End: 2025-06-10

## 2025-06-09 RX ORDER — CLONIDINE HYDROCHLORIDE 0.1 MG/1
0.2 TABLET ORAL 2 TIMES DAILY
Status: DISCONTINUED | OUTPATIENT
Start: 2025-06-09 | End: 2025-06-12 | Stop reason: HOSPADM

## 2025-06-09 RX ORDER — DEXAMETHASONE SODIUM PHOSPHATE 4 MG/ML
4 INJECTION, SOLUTION INTRA-ARTICULAR; INTRALESIONAL; INTRAMUSCULAR; INTRAVENOUS; SOFT TISSUE
Status: DISCONTINUED | OUTPATIENT
Start: 2025-06-09 | End: 2025-06-09 | Stop reason: HOSPADM

## 2025-06-09 RX ORDER — QUETIAPINE FUMARATE 25 MG/1
50 TABLET, FILM COATED ORAL AT BEDTIME
Status: DISCONTINUED | OUTPATIENT
Start: 2025-06-09 | End: 2025-06-12 | Stop reason: HOSPADM

## 2025-06-09 RX ORDER — MAGNESIUM HYDROXIDE 1200 MG/15ML
LIQUID ORAL PRN
Status: DISCONTINUED | OUTPATIENT
Start: 2025-06-09 | End: 2025-06-09 | Stop reason: HOSPADM

## 2025-06-09 RX ORDER — SODIUM CHLORIDE, SODIUM LACTATE, POTASSIUM CHLORIDE, CALCIUM CHLORIDE 600; 310; 30; 20 MG/100ML; MG/100ML; MG/100ML; MG/100ML
INJECTION, SOLUTION INTRAVENOUS CONTINUOUS
Status: CANCELLED | OUTPATIENT
Start: 2025-06-09

## 2025-06-09 RX ORDER — HYDROMORPHONE HCL IN WATER/PF 6 MG/30 ML
0.2 PATIENT CONTROLLED ANALGESIA SYRINGE INTRAVENOUS EVERY 5 MIN PRN
Status: DISCONTINUED | OUTPATIENT
Start: 2025-06-09 | End: 2025-06-09 | Stop reason: HOSPADM

## 2025-06-09 RX ORDER — SUMATRIPTAN 6 MG/.5ML
6 INJECTION, SOLUTION SUBCUTANEOUS
COMMUNITY

## 2025-06-09 RX ORDER — CALCIUM CARBONATE 260MG(650)
TABLET,CHEWABLE ORAL EVERY MORNING
COMMUNITY

## 2025-06-09 RX ORDER — SODIUM CHLORIDE, SODIUM LACTATE, POTASSIUM CHLORIDE, CALCIUM CHLORIDE 600; 310; 30; 20 MG/100ML; MG/100ML; MG/100ML; MG/100ML
INJECTION, SOLUTION INTRAVENOUS CONTINUOUS PRN
Status: DISCONTINUED | OUTPATIENT
Start: 2025-06-09 | End: 2025-06-09

## 2025-06-09 RX ORDER — NICOTINE POLACRILEX 4 MG
15-30 LOZENGE BUCCAL
Status: DISCONTINUED | OUTPATIENT
Start: 2025-06-09 | End: 2025-06-12 | Stop reason: HOSPADM

## 2025-06-09 RX ORDER — MINOXIDIL 2.5 MG/1
1.25 TABLET ORAL DAILY
COMMUNITY

## 2025-06-09 RX ORDER — DICLOFENAC SODIUM 75 MG/1
75 TABLET, DELAYED RELEASE ORAL 2 TIMES DAILY PRN
COMMUNITY

## 2025-06-09 RX ORDER — LIDOCAINE 4 G/G
1 PATCH TOPICAL PRN
COMMUNITY

## 2025-06-09 RX ORDER — PROCHLORPERAZINE MALEATE 10 MG
10 TABLET ORAL EVERY 6 HOURS PRN
Status: CANCELLED | OUTPATIENT
Start: 2025-06-09

## 2025-06-09 RX ADMIN — PROPOFOL 150 MCG/KG/MIN: 10 INJECTION, EMULSION INTRAVENOUS at 12:31

## 2025-06-09 RX ADMIN — HYDROMORPHONE HYDROCHLORIDE 0.5 MG: 1 INJECTION, SOLUTION INTRAMUSCULAR; INTRAVENOUS; SUBCUTANEOUS at 20:03

## 2025-06-09 RX ADMIN — ONDANSETRON 4 MG: 2 INJECTION, SOLUTION INTRAMUSCULAR; INTRAVENOUS at 09:09

## 2025-06-09 RX ADMIN — HYDROMORPHONE HYDROCHLORIDE 0.5 MG: 1 INJECTION, SOLUTION INTRAMUSCULAR; INTRAVENOUS; SUBCUTANEOUS at 22:28

## 2025-06-09 RX ADMIN — HYDROMORPHONE HYDROCHLORIDE 0.5 MG: 1 INJECTION, SOLUTION INTRAMUSCULAR; INTRAVENOUS; SUBCUTANEOUS at 04:08

## 2025-06-09 RX ADMIN — SODIUM CHLORIDE, SODIUM LACTATE, POTASSIUM CHLORIDE, AND CALCIUM CHLORIDE: .6; .31; .03; .02 INJECTION, SOLUTION INTRAVENOUS at 12:28

## 2025-06-09 RX ADMIN — METRONIDAZOLE 500 MG: 500 INJECTION, SOLUTION INTRAVENOUS at 19:56

## 2025-06-09 RX ADMIN — PROPOFOL 50 MG: 10 INJECTION, EMULSION INTRAVENOUS at 12:31

## 2025-06-09 RX ADMIN — SODIUM CHLORIDE 1000 ML: 0.9 INJECTION, SOLUTION INTRAVENOUS at 09:20

## 2025-06-09 RX ADMIN — DEXMEDETOMIDINE HYDROCHLORIDE 20 MCG: 100 INJECTION, SOLUTION INTRAVENOUS at 12:50

## 2025-06-09 RX ADMIN — HYDROMORPHONE HYDROCHLORIDE 0.5 MG: 1 INJECTION, SOLUTION INTRAMUSCULAR; INTRAVENOUS; SUBCUTANEOUS at 06:13

## 2025-06-09 RX ADMIN — HYDROXYZINE HYDROCHLORIDE 50 MG: 25 TABLET, FILM COATED ORAL at 17:06

## 2025-06-09 RX ADMIN — ONDANSETRON 4 MG: 2 INJECTION INTRAMUSCULAR; INTRAVENOUS at 12:31

## 2025-06-09 RX ADMIN — ACETAMINOPHEN 650 MG: 325 TABLET, FILM COATED ORAL at 22:27

## 2025-06-09 RX ADMIN — PROCHLORPERAZINE EDISYLATE 10 MG: 5 INJECTION INTRAMUSCULAR; INTRAVENOUS at 04:12

## 2025-06-09 RX ADMIN — METRONIDAZOLE 500 MG: 500 INJECTION, SOLUTION INTRAVENOUS at 08:16

## 2025-06-09 RX ADMIN — METHOCARBAMOL 750 MG: 750 TABLET ORAL at 20:03

## 2025-06-09 RX ADMIN — Medication 2 G: at 12:31

## 2025-06-09 RX ADMIN — HYDROMORPHONE HYDROCHLORIDE 0.5 MG: 1 INJECTION, SOLUTION INTRAMUSCULAR; INTRAVENOUS; SUBCUTANEOUS at 15:18

## 2025-06-09 RX ADMIN — HYDROMORPHONE HYDROCHLORIDE 0.5 MG: 1 INJECTION, SOLUTION INTRAMUSCULAR; INTRAVENOUS; SUBCUTANEOUS at 08:13

## 2025-06-09 RX ADMIN — SUMATRIPTAN 6 MG: 6 INJECTION, SOLUTION SUBCUTANEOUS at 11:31

## 2025-06-09 RX ADMIN — QUETIAPINE FUMARATE 50 MG: 25 TABLET ORAL at 22:29

## 2025-06-09 RX ADMIN — HYDROXYZINE HYDROCHLORIDE 100 MG: 25 TABLET, FILM COATED ORAL at 22:28

## 2025-06-09 RX ADMIN — SODIUM CHLORIDE: 900 INJECTION INTRAVENOUS at 23:36

## 2025-06-09 RX ADMIN — HYDROMORPHONE HYDROCHLORIDE 0.5 MG: 1 INJECTION, SOLUTION INTRAMUSCULAR; INTRAVENOUS; SUBCUTANEOUS at 02:07

## 2025-06-09 RX ADMIN — HYDROMORPHONE HYDROCHLORIDE 0.5 MG: 1 INJECTION, SOLUTION INTRAMUSCULAR; INTRAVENOUS; SUBCUTANEOUS at 17:58

## 2025-06-09 RX ADMIN — DEXAMETHASONE SODIUM PHOSPHATE 4 MG: 4 INJECTION, SOLUTION INTRA-ARTICULAR; INTRALESIONAL; INTRAMUSCULAR; INTRAVENOUS; SOFT TISSUE at 12:31

## 2025-06-09 RX ADMIN — CEFEPIME 2 G: 2 INJECTION, POWDER, FOR SOLUTION INTRAVENOUS at 06:02

## 2025-06-09 RX ADMIN — MIDAZOLAM 2 MG: 1 INJECTION INTRAMUSCULAR; INTRAVENOUS at 12:28

## 2025-06-09 RX ADMIN — ONDANSETRON 4 MG: 2 INJECTION, SOLUTION INTRAMUSCULAR; INTRAVENOUS at 00:00

## 2025-06-09 RX ADMIN — CEFEPIME 2 G: 2 INJECTION, POWDER, FOR SOLUTION INTRAVENOUS at 18:15

## 2025-06-09 RX ADMIN — LIDOCAINE HYDROCHLORIDE 100 MG: 20 INJECTION, SOLUTION INFILTRATION; PERINEURAL at 12:31

## 2025-06-09 ASSESSMENT — ACTIVITIES OF DAILY LIVING (ADL)
ADLS_ACUITY_SCORE: 43
ADLS_ACUITY_SCORE: 44
ADLS_ACUITY_SCORE: 51
ADLS_ACUITY_SCORE: 59
ADLS_ACUITY_SCORE: 44
ADLS_ACUITY_SCORE: 51
ADLS_ACUITY_SCORE: 43
ADLS_ACUITY_SCORE: 51
ADLS_ACUITY_SCORE: 43
ADLS_ACUITY_SCORE: 51
ADLS_ACUITY_SCORE: 43
ADLS_ACUITY_SCORE: 43
ADLS_ACUITY_SCORE: 41
ADLS_ACUITY_SCORE: 51
ADLS_ACUITY_SCORE: 51
ADLS_ACUITY_SCORE: 44
ADLS_ACUITY_SCORE: 49
ADLS_ACUITY_SCORE: 51
ADLS_ACUITY_SCORE: 49
ADLS_ACUITY_SCORE: 43
ADLS_ACUITY_SCORE: 51

## 2025-06-09 NOTE — PLAN OF CARE
Goal Outcome Evaluation:  2234-0730       Pt arrived from another hospital via EMS. Pt is alert, oriented X 4. Tachycardic, BP normotensive, complaining of headache. Up with A1 GB. Tele is ST/SR. HR went down to 90s to 100s at the end of the shift. Pt still complaining of headache rated 10/10, managed with PRN IV Dilaudid. Pt also was complaining of nausea, managed with PRN Zofran and PRN Compazine. On NPO except meds, may have ice chips. Voiding adequately, pure wick in place; urine claire in color. +BS, no BM this shift. Fever resolved overnight. For GI consult; for possible ERCP.      Pt had 30 beats of SVT prior to which she was complaining of severe headache. She denied chest pain or palpitations. - Informed cross-cover Provider with no orders received.

## 2025-06-09 NOTE — PHARMACY-ADMISSION MEDICATION HISTORY
Pharmacist Admission Medication History    Admission medication history is complete. The information provided in this note is only as accurate as the sources available at the time of the update.    Information Source(s): Patient and CareEverywhere/SureScripts via in-person    Changes made to PTA medication list:  Added: Qulipta 60 mg, Clonidine, Diclofenac, Dicyclomine,Estradiol, Hydroxyzine, Lidocaine patch, Linzess, Metformin  mg , Methocarbamol 750 mg, Minoxidil, Omeprazole 40 mg BID, Promethazine, Rosuvastatin Sumatriptan oral & injectable, Tirzepatide 10 mg, Ubrelvy, Vilazodone 40 mg, Vitamin D, Magnesium glycinate, Magnesium citrate, Fibercon, Senokot, Multivitamin.   Deleted: Oxycodone, Citalopram, eletriptan, Lorazepam, Metronidazole tablet, Metronidazole vag gel, Prochlorperazine, Simvastatin, Topiramate.   Changed: Quetiapine from 25 mg to 50 mg.     Allergies reviewed with patient and updates made in EHR: yes    Medication History Completed By: Josey Domingo RPH 6/9/2025 12:04 PM    PTA Med List   Medication Sig Last Dose/Taking    acetaminophen-codeine (TYLENOL #3) 300-30 MG per tablet Take 1-2 tablets by mouth every 6 hours as needed for pain. (Patient taking differently: Take 1 tablet by mouth every 6 hours as needed for pain.) 6/7/2025    aluminum chloride (DRYSOL) 20 % external solution Apply topically at bedtime. More than a month    aspirin (ASA) 81 MG chewable tablet Take 81 mg by mouth daily. 6/7/2025    Atogepant (QULIPTA) 60 MG tablet Take 60 mg by mouth daily. 6/7/2025 Morning    calcium polycarbophil (FIBERCON) 625 MG tablet Take 1 tablet by mouth every evening. 6/7/2025 Evening    cloNIDine (CATAPRES) 0.1 MG tablet Take 0.2 mg by mouth 2 times daily. 6/7/2025 Evening    diclofenac (VOLTAREN) 75 MG EC tablet Take 75 mg by mouth 2 times daily as needed for moderate pain. 6/7/2025    dicyclomine (BENTYL) 10 MG capsule Take 10 mg by mouth 3 times daily (before meals). TAKE 1 CAPSULE BY  MOUTH THREE TIMES A DAY BEFORE MEALS. TAKE AS NEEDED FOR ABDOMINAL PAIN/CRAMPING 6/8/2025 Morning    estradiol (ESTRACE) 1 MG tablet Take 1 mg by mouth daily. 6/8/2025    hydrOXYzine HCl (ATARAX) 50 MG tablet Take 50 mg by mouth See Admin Instructions. Take 1 tablet by mouth twice a day and 2 tablets at bedtime. 6/8/2025 Morning    Lidocaine (LIDOCARE) 4 % Patch Place 1 patch onto the skin as needed for moderate pain. To prevent lidocaine toxicity, patient should be patch free for 12 hrs daily. 6/6/2025 Evening    linaclotide (LINZESS) 72 MCG capsule Take 72 mcg by mouth every morning (before breakfast). 6/8/2025 Morning    Magnesium Citrate 100 MG TABS Take by mouth every morning. Pt unsure of dose. 6/7/2025 Morning    MAGNESIUM GLYCINATE PO Take by mouth every evening. Pt unsure of dose. 6/7/2025    metFORMIN (GLUCOPHAGE-XR) 750 MG 24 hr tablet Take 750 mg by mouth daily (with breakfast). 6/7/2025 Morning    methocarbamol (ROBAXIN) 750 MG tablet Take 750 mg by mouth nightly as needed for muscle spasms. 6/7/2025 Evening    minoxidil (LONITEN) 2.5 MG tablet Take 1.25 mg by mouth daily. 6/8/2025 Morning    multivitamin, therapeutic (THERA-VIT) TABS tablet Take 1 tablet by mouth daily. 6/7/2025 Morning    omeprazole (PRILOSEC) 40 MG DR capsule Take 40 mg by mouth 2 times daily. 6/7/2025 Evening    promethazine (PHENERGAN) 12.5 MG tablet Take 12.5 mg by mouth 2 times daily as needed for nausea. 6/7/2025 Evening    QUEtiapine (SEROQUEL) 50 MG tablet Take 50 mg by mouth at bedtime. 6/7/2025 Bedtime    rosuvastatin (CRESTOR) 5 MG tablet Take 5 mg by mouth at bedtime. 6/7/2025 Bedtime    sennosides (SENOKOT) 8.6 MG tablet Take 2 tablets by mouth at bedtime. 6/7/2025 Bedtime    SUMAtriptan (IMITREX) 100 MG tablet Take 100 mg by mouth at onset of headache for migraine. May repeat after 2 hours if necessary, not to exceed 200mg in 24hrs 6/6/2025    SUMAtriptan (IMITREX) 6 MG/0.5ML injection Inject 6 mg subcutaneously at onset  of headache for migraine. May repeat 1 hour after the first dose. Do not exceed 2 doses per 24 hours* 6/5/2025    tirzepatide (MOUNJARO) 10 MG/0.5ML SOAJ auto-injector pen Inject 10 mg subcutaneously once a week. 6/4/2025 Morning    ubrogepant (UBRELVY) 100 MG tablet Take 100 mg by mouth at onset of headache (for migraine). May repeat after 2 hrs as needed. Max of 200 mg in 24 hours. 6/7/2025    vilazodone (VIIBRYD) 40 MG TABS tablet Take 40 mg by mouth every morning. 6/8/2025 Morning    Vitamin D3 (VITAMIN D, CHOLECALCIFEROL,) 25 mcg (1000 units) tablet Take 25 mcg by mouth daily. 6/7/2025

## 2025-06-09 NOTE — CONSULTS
General Surgery Consultation  We are asked by Dr. Song to see Natty Gamino Fransico in consultation regarding choledocholithiasis and cholangitis .    Natty Bateman  YOB: 1970    Age: 54 year old  MRN#: [id number]    Date of Admission: 6/8/2025  Surgeon:   SAVANA MILAN MD                  Chief Complaint:   Abdominal pain         History of Present Illness:   This patient is a 54 year old  female who presented to the  ED for evaluation of RUQ pain, fevers/chills, and nausea.     Symptoms started yesterday evening. They have been fairly constant aside from some relief with tums. Endorses chills and subjective fever at home. Also with nausea but no emesis. No diarrhea, last BM two days ago. Had ERCP today where choledocholithiasis was found, pus and sludge swept from the bile duct, and stent placed. Feeling a little better now but still having some epigastric/RUQ pain that radiates to the back.     Has surgical history of hysterectomy.          Past Medical History:    has a past medical history of Abnormal glucose, ddd, Depression, Insomnia, Leiomyoma of uterus, unspecified (2007), Migraine headaches, Obesity (11/12/2009), and Overweight(278.02).          Past Surgical History:     Past Surgical History:   Procedure Laterality Date    HC REPAIR ROTATOR CUFF,ACUTE  2-2009    TONSILLECTOMY  age 33    TUBAL LIGATION  age 24    Z TOTAL ABDOM HYSTERECTOMY  5-2010    has ovaries, myomatous uterus            Medications:     Prior to Admission medications    Medication Sig Start Date End Date Taking? Authorizing Provider   acetaminophen-codeine (TYLENOL #3) 300-30 MG per tablet Take 1-2 tablets by mouth every 6 hours as needed for pain.  Patient taking differently: Take 1 tablet by mouth every 6 hours as needed for pain. 8/3/11  Yes Sadiq Campo MD   aluminum chloride (DRYSOL) 20 % external solution Apply topically at bedtime.   Yes Unknown, Entered  By History   aspirin (ASA) 81 MG chewable tablet Take 81 mg by mouth daily.   Yes Unknown, Entered By History   Atogepant (QULIPTA) 60 MG tablet Take 60 mg by mouth daily.   Yes Unknown, Entered By History   calcium polycarbophil (FIBERCON) 625 MG tablet Take 1 tablet by mouth every evening.   Yes Unknown, Entered By History   cloNIDine (CATAPRES) 0.1 MG tablet Take 0.2 mg by mouth 2 times daily.   Yes Unknown, Entered By History   diclofenac (VOLTAREN) 75 MG EC tablet Take 75 mg by mouth 2 times daily as needed for moderate pain.   Yes Unknown, Entered By History   dicyclomine (BENTYL) 10 MG capsule Take 10 mg by mouth 3 times daily (before meals). TAKE 1 CAPSULE BY MOUTH THREE TIMES A DAY BEFORE MEALS. TAKE AS NEEDED FOR ABDOMINAL PAIN/CRAMPING   Yes Unknown, Entered By History   estradiol (ESTRACE) 1 MG tablet Take 1 mg by mouth daily.   Yes Unknown, Entered By History   hydrOXYzine HCl (ATARAX) 50 MG tablet Take 50 mg by mouth See Admin Instructions. Take 1 tablet by mouth twice a day and 2 tablets at bedtime.   Yes Unknown, Entered By History   Lidocaine (LIDOCARE) 4 % Patch Place 1 patch onto the skin as needed for moderate pain. To prevent lidocaine toxicity, patient should be patch free for 12 hrs daily.   Yes Unknown, Entered By History   linaclotide (LINZESS) 72 MCG capsule Take 72 mcg by mouth every morning (before breakfast).   Yes Unknown, Entered By History   Magnesium Citrate 100 MG TABS Take by mouth every morning. Pt unsure of dose.   Yes Unknown, Entered By History   MAGNESIUM GLYCINATE PO Take by mouth every evening. Pt unsure of dose.   Yes Unknown, Entered By History   metFORMIN (GLUCOPHAGE-XR) 750 MG 24 hr tablet Take 750 mg by mouth daily (with breakfast).   Yes Unknown, Entered By History   methocarbamol (ROBAXIN) 750 MG tablet Take 750 mg by mouth nightly as needed for muscle spasms.   Yes Unknown, Entered By History   minoxidil (LONITEN) 2.5 MG tablet Take 1.25 mg by mouth daily.   Yes  Unknown, Entered By History   multivitamin, therapeutic (THERA-VIT) TABS tablet Take 1 tablet by mouth daily.   Yes Unknown, Entered By History   omeprazole (PRILOSEC) 40 MG DR capsule Take 40 mg by mouth 2 times daily.   Yes Unknown, Entered By History   promethazine (PHENERGAN) 12.5 MG tablet Take 12.5 mg by mouth 2 times daily as needed for nausea.   Yes Unknown, Entered By History   QUEtiapine (SEROQUEL) 50 MG tablet Take 50 mg by mouth at bedtime.   Yes Unknown, Entered By History   rosuvastatin (CRESTOR) 5 MG tablet Take 5 mg by mouth at bedtime.   Yes Unknown, Entered By History   sennosides (SENOKOT) 8.6 MG tablet Take 2 tablets by mouth at bedtime.   Yes Unknown, Entered By History   SUMAtriptan (IMITREX) 100 MG tablet Take 100 mg by mouth at onset of headache for migraine. May repeat after 2 hours if necessary, not to exceed 200mg in 24hrs   Yes Unknown, Entered By History   SUMAtriptan (IMITREX) 6 MG/0.5ML injection Inject 6 mg subcutaneously at onset of headache for migraine. May repeat 1 hour after the first dose. Do not exceed 2 doses per 24 hours*   Yes Unknown, Entered By History   tirzepatide (MOUNJARO) 10 MG/0.5ML SOAJ auto-injector pen Inject 10 mg subcutaneously once a week.   Yes Unknown, Entered By History   ubrogepant (UBRELVY) 100 MG tablet Take 100 mg by mouth at onset of headache (for migraine). May repeat after 2 hrs as needed. Max of 200 mg in 24 hours.   Yes Unknown, Entered By History   vilazodone (VIIBRYD) 40 MG TABS tablet Take 40 mg by mouth every morning.   Yes Unknown, Entered By History   Vitamin D3 (VITAMIN D, CHOLECALCIFEROL,) 25 mcg (1000 units) tablet Take 25 mcg by mouth daily.   Yes Unknown, Entered By History   ACE/ARB NOT PRESCRIBED, INTENTIONAL, by Other route continuous prn. 3/28/12   Sadiq Campo MD   Blood Glucose Monitoring Suppl (ONE TOUCH ULTRA 2) W/DEVICE KIT 1 Device daily. 7/14/11   Sadiq Campo MD   DIABETIC STERILE LANCETS device 1 Device daily. 7/6/11    "Sadiq Campo MD            Allergies:     Allergies   Allergen Reactions    Adhesive Tape Dermatitis    Depakene [Valproic Acid] Other (See Comments)    Fish Oil Rash    Geodon [Ziprasidone] Other (See Comments)    Tegaderm Transparent Dressing (Informational Only) Dermatitis    Vortioxetine Itching            Social History:     Social History     Tobacco Use    Smoking status: Never    Smokeless tobacco: Not on file   Substance Use Topics    Alcohol use: Yes     Comment: minimal             Family History:   This patient has no pertinent family history, specifically no family history of any bleeding, clotting or anesthesia problems.          Review of Systems:   Brief ROS is negative other than noted in the HPI.         Physical Exam:   Blood pressure 109/57, pulse 80, temperature 98.1  F (36.7  C), temperature source Oral, resp. rate 17, height 1.6 m (5' 3\"), weight 74.8 kg (165 lb), SpO2 100%.  I/O last 3 completed shifts:  In: 2234.58 [P.O.:85; I.V.:1047.58; IV Piggyback:1102]  Out: 1000 [Urine:1000]    General - This is a well developed, well nourished female in no apparent distress.   Head - normocephalic, atraumatic. No scleral icterus   Respiratory - breathing comfortably on room air   Cardiovascular - regular rate and rhythm; no obvious murmurs  Abdomen - Soft, nondistended. Mild tenderness to palpation in RUQ and epigastrium.   Extremities - Moves all extremities.   Neurologic - Nonfocal          Data:   Labs:  Recent Labs   Lab Test 06/09/25  0704 02/01/25  1607   WBC 17.0* 6.9   HGB 11.8 12.5   HCT 35.4 38.1    280     Recent Labs   Lab Test 06/09/25  0704 02/01/25  1607   POTASSIUM 4.1 3.4   CHLORIDE 102 99   CO2 18* 26   BUN 11.7 10.0   CR 0.76 0.74     Recent Labs   Lab Test 06/09/25  0704 02/01/25  1607   BILITOTAL 1.9* 0.4   * 101*   * 292*   ALKPHOS 82 128   LIPASE  --  42     No lab results found.  Recent Labs   Lab Test 06/09/25  0704 02/01/25  1607   SYED 7.5* 8.7* "     Recent Labs   Lab Test 06/09/25  0704 02/01/25  1607   ANIONGAP 16* 12   ALBUMIN 3.1* 3.9       CT scan of the abdomen:   IMPRESSION:   1.  Choledocholithiasis with new significant intra and extrahepatic biliary   ductal dilatation.            Assessment:   Natty Bateman is a 54 year old female with a PMH of DM II who is admitted with choledocholithiasis and cholangitis s/p ERCP w/ stent today. To prevent future complications from gallstones, recommend cholecystectomy prior to discharge          Plan:   - NPO midnight  - continue IV abx   - trend LFTs   - OR tomorrow for laparoscopic cholecystectomy       I have discussed the history, physical, and plan with Dr. Boggs.       Ayad Buck MD PGY-4  General Surgery   [phone]

## 2025-06-09 NOTE — PROVIDER NOTIFICATION
0626: Informed Dr Don Desai that pt had 30 beats of SVT; she was complaining of headache (rated 10/10) prior to that. IV pain med given for the headache. Aside from that, she denies chest pain or palpitations.    Provider read the message.

## 2025-06-09 NOTE — PROVIDER NOTIFICATION
has attempted to draw blood cultures 3xs. Pt is refusing lab draws. Paged Dr Deanna Yeh with an FYI.     IV team paged and stated they do not draw blood.

## 2025-06-09 NOTE — H&P
[facility]    History and Physical - Hospitalist Service       Date of Admission:  6/8/2025    Assessment & Plan      Natty Bateman is a 54 year old female with extensive history including but not limited to type 2 diabetes mellitus, bipolar disorder, hypertension, hyperlipidemia, IBS, urinary retention s/p bladder stimulator, chronic pain, migraine, GERD, and insomnia among others who presented to [place] [address] ED with worsening lower chest / upper abdominal discomfort.  She did not sleep very well last night and felt off this morning when getting ready for work -mild shortness of breath at that time noted.  She denied cough or other cold symptoms.  Subsequently she began to feel nauseous and also noted some increased urinary frequency but no burning.  She has had chills but not checked her temperature today.  No change in bowel habits and she denies abdominal pain.  She did have onset of migraine feeling similar to her previous ones.  No recent sick contacts, unusual food ingestion, or travel reported.     Work up in [facility] showed WBC 11.3, LA 5.3, , , Bili 2.3. CT abdo/pelvis showed choledocholithiasis w/ intra & extrahepatic biliary ductal dilation. Blood cultures were obtained and she was started on cefepime, flagyl, and IVF 30cc/kg. She become tachycardic and febrile prior to transfer to [facility]. Dr Forman was alerted of the patients upcoming transfer and will be consulted. She remains NPO. Upon arrival to [facility], she confirms the above and is still having migraine similar to her prior; she reports that regular mountain dew often helps but we unfortunately do not have that available (checked vending machine in basement as well - only diet/zero sugar which she declines). Meds are awaiting verification but we she reports taking seroquel 25mg nightly so that is ordered once.       Ascending cholangitis with sepsis  Symptoms of chills, upper abdominal  discomfort and nausea progressing from last night into today. Leukocytosis 11.3K and lactic acid >5 at initial ED work up. Transaminitis as above and BR 2.3. Lipase 172, WNL.   *lactic acid 5.1 --> 4.1   *CT PE negative for clot, (d dimer 1.9), trop WNL  *CT abdo with choledocholithiasis and dilated biliary ducts  *blood cultures drawn at outside ED  *empiric cefepime and metronidazole started  *IVF sepsis bolus  - [place] [facility] [place]  - [facility] GI consulted, likely ERCP upcoming; surgery not yet consulted  - NPO, sips, chips ok  - IVF  - antiemetic, analgesic, antipyretic prn  - continue cefepime and metro  - repeat lactic, follow up cultures from [facility]  - follow labs in AM    Hypochloremic hyponatremia  *Na 130, Cl 93 at 5pm 6/8 at [facility]  - IVF continued  - labs in AM    Migraine  Acute on chronic currently. Reports mtn dew usually helps but we don't have that at [facility] currently. She is using ice on her head and hopeful that her hs seroquel (ordered as 25mg once as she believes this is her dose) may provide some relief.   - await home meds  - prn apap, dilaudid also available    T2DM  *A1c 6.1% recently, follows with Endo at [facility]  - PTA mounjaro noted - previously on ozempic  - hold PTA mounjaro  - SSI    Hypertension  Hyperlipidemia  - vitals to be updated  - PTA clonidine to resume as long as BP acceptable if/when verified  - PTA statin held for now    Bipolar disorder  Stable upon admission  - PTA regimen to resume when taking PO    GERD  - PPI    Chronic pain syndrome  Noted.   - PTA regimen to continue as appropriate with acute pain regimen as above        Diet:  npo  DVT Prophylaxis: Pneumatic Compression Devices  Dent Catheter: Not present  Lines: None     Cardiac Monitoring: None  Code Status:  DNR DNI - confirmed with patient upon admission    Clinically Significant Risk Factors Present on Admission                 # Drug Induced Platelet Defect: home medication list includes an  antiplatelet medication   # Hypertension: Home medication list includes antihypertensive(s)                      Disposition Plan     Medically Ready for Discharge: Anticipated in 2-4 Days           Don Sidhu MD  Hospitalist Service  [facility]  Securely message with [facility] (more info)  Text page via [employer] Paging/Directory     ______________________________________________________________________    Chief Complaint   Lower abdominal pain, chills, nausea - transferred from outside ED with cholangitis - sepsis    History is obtained from the patient and chart review    History of Present Illness   Natty Bateman is a 54 year old female with extensive history including but not limited to type 2 diabetes mellitus, bipolar disorder, hypertension, hyperlipidemia, IBS, urinary retention s/p bladder stimulator, chronic pain, migraine, GERD, and insomnia among others who presented to [place] [address] ED with worsening lower chest / upper abdominal discomfort.  She did not sleep very well last night and felt off this morning when getting ready for work -mild shortness of breath at that time noted.  She denied cough or other cold symptoms.  Subsequently she began to feel nauseous and also noted some increased urinary frequency but no burning.  She has had chills but not checked her temperature today.  No change in bowel habits and she denies abdominal pain.  She did have onset of migraine feeling similar to her previous ones.  No recent sick contacts, unusual food ingestion, or travel reported.     Work up in [place] ED showed WBC 11.3, LA 5.3, , , Bili 2.3. CT abdo/pelvis showed choledocholithiasis w/ intra & extrahepatic biliary ductal dilation. Blood cultures were obtained and she was started on cefepime, flagyl, and IVF 30cc/kg. She become tachycardic and febrile prior to transfer to [facility]. Dr Forman was alerted of the patients upcoming transfer and will be  consulted. She remains NPO. Upon arrival to [facility], she confirms the above and is still having migraine similar to her prior; she reports that regular mountain dew often helps but we unfortunately do not have that available (checked vending machine in basement as well - only diet/zero sugar which she declines). Meds are awaiting verification but we she reports taking seroquel 25mg nightly so that is ordered once.       Past Medical History    Past Medical History:   Diagnosis Date    Abnormal glucose     ddd     cervical    Depression     Insomnia     Leiomyoma of uterus, unspecified     Migraine headaches     Obesity 2009    Overweight(278.02)        Past Surgical History   Past Surgical History:   Procedure Laterality Date    HC REPAIR ROTATOR CUFF,ACUTE  2-    TONSILLECTOMY  age 33    TUBAL LIGATION  age 24    ZZC TOTAL ABDOM HYSTERECTOMY      has ovaries, myomatous uterus       Prior to Admission Medications   Prior to Admission Medications   Prescriptions Last Dose Informant Patient Reported? Taking?   ACE/ARB NOT PRESCRIBED, INTENTIONAL,   Yes No   Sig: by Other route continuous prn.   Blood Glucose Monitoring Suppl (ONE TOUCH ULTRA 2) W/DEVICE KIT   No No   Si Device daily.   DIABETIC STERILE LANCETS device   No No   Si Device daily.   MetroNIDazole (FLAGYL) 500 MG tablet   No No   Sig: Take 1 tablet by mouth 3 times daily.   MetroNIDazole (METROGEL) 0.75 % vaginal gel   No No   Sig: Place  vaginally At Bedtime.   acetaminophen-codeine (TYLENOL #3) 300-30 MG per tablet   No No   Sig: Take 1-2 tablets by mouth every 6 hours as needed for pain.   aluminum chloride (DRYSOL) 20 % external solution   No No   Sig: Apply  topically. Use at HS until dry and then 2-3 times a week as directed   aspirin 81 MG tablet   Yes No   Sig: Take 1 tablet by mouth daily.   citalopram (CELEXA) 40 MG tablet   No No   Sig: Take 2 tablets by mouth daily.   eletriptan (RELPAX) 20 MG tablet   Yes No    Sig: Take 1 tablet by mouth at onset of headache for migraine. May repeat in 2 hours if needed: max 2/day; average number of headaches monthly   lorazepam (ATIVAN) 0.5 MG tablet   No No   Sig: Take 2 tablets by mouth 2 times daily as needed for anxiety.   prochlorperazine (COMPAZINE) 5 MG tablet   No No   Sig: Take 1 tablet by mouth every 6 hours as needed for nausea.   quetiapine (SEROQUEL) 25 MG tablet   No No   Sig: Take 1 tablet by mouth nightly as needed.   simvastatin (ZOCOR) 10 MG tablet   No No   Sig: Take 1 tablet by mouth. at bedtime.   topiramate (TOPAMAX) 100 MG tablet   No No   Sig: Take 3 tablets by mouth At Bedtime.      Facility-Administered Medications: None        Review of Systems    The 10 point Review of Systems is negative other than noted in the HPI or here.      Physical Exam   Vital Signs:                    Weight: 0 lbs 0 oz    Gen: NAD, pleasant  HEENT: EOMI, MMM  Resp: no focal crackles, no wheezes, no increased work of resp  CV: S1S2 heard, reg rhythm, mildly tachy rate  Abdo: soft, nontender, nondistended, bowel sounds present  Ext: calves nontender, well perfused  Neuro: aa, conversing, moving ext, CN grossly intact, no facial asymmetry      Medical Decision Making       82 MINUTES SPENT BY ME on the date of service doing chart review, history, exam, documentation & further activities per the note.      Data         Imaging results reviewed over the past 24 hrs:   Recent Results (from the past 24 hours)   CTA Chest with Contrast    Narrative    EXAM: CT ANGIO CHEST PE  LOCATION: [facility]  DATE: 6/8/2025    INDICATION: Chest pain.  COMPARISON: 2/5/24  TECHNIQUE: CT chest pulmonary angiogram during arterial phase injection of IV  contrast. Multiplanar reformats and MIP reconstructions were performed. Dose  reduction techniques were used.   CONTRAST: omni 350 100ml    FINDINGS:  ANGIOGRAM CHEST: Mild motion artifact. No pulmonary embolism. Nonaneurysmal  aorta without  dissection.    LUNGS AND PLEURA: Lungs are clear. No pleural effusion or pneumothorax.    MEDIASTINUM/AXILLAE: No adenopathy. Normal heart size. No pericardial effusion.  Mildly patulous esophagus; correlate for dysmotility.    CORONARY ARTERY CALCIFICATION: Motion artifact.    UPPER ABDOMEN: Please see same-day CT abdomen-pelvis. Incompletely seen  distended gallbladder and common bile duct dilatation.    MUSCULOSKELETAL: Nothing acute.    IMPRESSION:    1.  No acute intrathoracic findings to explain symptoms.    2.  No pulmonary embolism.    Electronically signed by: Dayne Dobson DO 6/8/2025 7:32 PM CDT   CT Abdomen Pelvis w Contrast    Narrative    EXAM: CT ABDOMEN PELVIS W IV CONTRAST  LOCATION: [facility]  DATE: 6/8/2025    INDICATION: Sepsis  COMPARISON: 2/5/24  TECHNIQUE: CT scan of the abdomen and pelvis was performed following injection  of IV contrast. Multiplanar reformats were obtained. Dose reduction techniques  were used.  CONTRAST: omni 350 100ml    FINDINGS:   LOWER CHEST: No basilar infiltrates    HEPATOBILIARY: Dilated gallbladder. New intra and extra hepatic biliary ductal  dilatation with the common duct reaching 12 mm. 6.7 mm high density filling  defect in the common bile duct compatible with choledocholithiasis (series 4  image 48).    PANCREAS: No ductal dilatation or peripancreatic fluid collections    SPLEEN: Unremarkable    ADRENAL GLANDS: Unremarkable    KIDNEYS/BLADDER: No hydronephrosis or suspicious renal mass lesions. Normal  bladder contour.    BOWEL: Normal caliber appendix.    LYMPH NODES: No significant retroperitoneal adenopathy    VASCULATURE: No abdominal aortic aneurysm    PELVIC ORGANS: Hysterectomy. No free fluid    MUSCULOSKELETAL: Sacral stimulator. No acute bony abnormalities.    IMPRESSION:   1.  Choledocholithiasis with new significant intra and extrahepatic biliary  ductal dilatation.    Electronically signed by: López Trinidad MD 6/8/2025 7:33 PM CDT

## 2025-06-09 NOTE — PLAN OF CARE
Problem: Cholecystectomy  Goal: Fluid and Electrolyte Balance  Outcome: Progressing  Goal: Absence of Infection Signs and Symptoms  Outcome: Progressing  Goal: Anesthesia/Sedation Recovery  Outcome: Progressing  Intervention: Optimize Anesthesia Recovery  Recent Flowsheet Documentation  Taken 6/9/2025 0800 by Nyla Watkins RN  Safety Promotion/Fall Prevention:   activity supervised   assistive device/personal items within reach   increase visualization of patient   increased rounding and observation   clutter free environment maintained   nonskid shoes/slippers when out of bed   room near nurse's station   safety round/check completed  Administration (IS): refused  Goal: Pain Control and Function  Outcome: Progressing  Intervention: Prevent or Manage Pain  Recent Flowsheet Documentation  Taken 6/9/2025 1000 by Nyla Watkins RN  Pain Management Interventions: cold applied  Taken 6/9/2025 0813 by Nyla Watkins RN  Pain Management Interventions: medication (see MAR)  Goal: Nausea and Vomiting Relief  Outcome: Progressing  Intervention: Prevent or Manage Nausea and Vomiting  Recent Flowsheet Documentation  Taken 6/9/2025 0800 by Nyla Watkins RN  Nausea/Vomiting Interventions:   antiemetic   sips of clear liquids given     Problem: Comorbidity Management  Goal: Blood Glucose Levels Within Targeted Range  Outcome: Progressing  Intervention: Monitor and Manage Glycemia  Recent Flowsheet Documentation  Taken 6/9/2025 0800 by Nyla Watkins RN  Medication Review/Management: medications reviewed     Problem: Pain Chronic (Persistent)  Goal: Optimal Pain Control and Function  Outcome: Progressing  Intervention: Develop Pain Management Plan  Recent Flowsheet Documentation  Taken 6/9/2025 1000 by Nyla Watkins RN  Pain Management Interventions: cold applied  Taken 6/9/2025 0813 by Nyla Watkins RN  Pain Management Interventions: medication (see MAR)  Intervention: Manage Persistent Pain  Recent Flowsheet Documentation  Taken 6/9/2025 0800  "by Watkins, Nyla, RN  Medication Review/Management: medications reviewed   Goal Outcome Evaluation:    Orientations: Alert and oriented: time, place, person, and situation, forgetful  Tele:Normal Sinus  Pain:Reports 10/10 severe headache pain and 9/10 severe abdominal pain that improves with PRN dilaudid. Sumatriptan IM given for headache pain. Cold and heat packs provided. Patient repositioning self for comfort.   Vitals:BP (!) 156/58 (BP Location: Left arm, Cuff Size: Adult Regular)   Pulse 97   Temp 99.8  F (37.7  C) (Oral)   Resp 20   Ht 1.6 m (5' 3\")   Wt 74.8 kg (165 lb)   SpO2 97%   BMI 29.23 kg/m    /73 (BP Location: Left arm, Cuff Size: Adult Regular)   Pulse 86   Temp 99.1  F (37.3  C) (Oral)   Resp 18   Ht 1.6 m (5' 3\")   Wt 74.8 kg (165 lb)   SpO2 99%   BMI 29.23 kg/m    Respiratory: The patient denies: dry cough and SOB clear to auscultation in all fields. The patient is maintaining saturations >92% on room air at rest.   Cardiac:No signs of bleeding. Regular rhythm and S1 S2. Edema: none  Radial Pulses 2+. Pedial Pulses 2+.  Neuros:without deficit, forgetful   GI: Soft, round, tender  and Bowel sounds normoactive. BM this shift: No  Diet: Other - NPO/ice chips -  Blood glucose monitoring: Yes  : The patient demonstrates No problems urinating. Urine output is claire  Skin: normal, warm, no rash, normal skin turgor with bruising to the abdomen.   Lines/Drains: PIV in right AC and 2nd placed in right hand for abx and fluids.   Labs:Lactic 1.5. Anion gap 16. WBC 17     Electrolyte Replacement: none  Ambulation/Assist: standby  Activity: turning self in bed, declined to stand unless getting on cart for pre-op.   Changes to Plan of Care: home medications reordered. ERCP completed. Plan for NPO for laparoscopic removal of gall bladder tomorrow.    Today's Progress: Pain MGMT underway. Hydration. Monitoring BG.      Nyla Watkins RN on 6/9/2025 at 6:34 PM      "

## 2025-06-09 NOTE — ANESTHESIA PREPROCEDURE EVALUATION
Anesthesia Pre-Procedure Evaluation    Patient: Natty Bateman   MRN: [id number] : 1970          Procedure : Procedure(s):  ENDOSCOPIC RETROGRADE CHOLANGIOPANCREATOGRAPHY, COMPLEX         Past Medical History:   Diagnosis Date    Abnormal glucose     ddd     cervical    Depression     Insomnia     Leiomyoma of uterus, unspecified     Migraine headaches     Obesity 2009    Overweight(278.02)       Past Surgical History:   Procedure Laterality Date    HC REPAIR ROTATOR CUFF,ACUTE  2-    TONSILLECTOMY  age 33    TUBAL LIGATION  age 24    ZZC TOTAL ABDOM HYSTERECTOMY      has ovaries, myomatous uterus      Allergies   Allergen Reactions    Adhesive Tape Dermatitis    Depakene [Valproic Acid] Other (See Comments)    Fish Oil Rash    Geodon [Ziprasidone] Other (See Comments)    Tegaderm Transparent Dressing (Informational Only) Dermatitis    Vortioxetine Itching      Social History     Tobacco Use    Smoking status: Never    Smokeless tobacco: Not on file   Substance Use Topics    Alcohol use: Yes     Comment: minimal      Wt Readings from Last 1 Encounters:   25 74.8 kg (165 lb)        Anesthesia Evaluation            ROS/MED HX  ENT/Pulmonary:  - neg pulmonary ROS  (-) sleep apnea   Neurologic:     (+)      migraines,                          Cardiovascular:     (+) Dyslipidemia hypertension- -   -  - -                                 Previous cardiac testing   Echo: Date: Results:    Stress Test:  Date: 6/3/24 Results:  Negative for ischemia  ECG Reviewed:  Date: Results:    Cath:  Date: Results:      METS/Exercise Tolerance:     Hematologic:  - neg hematologic  ROS     Musculoskeletal:  - neg musculoskeletal ROS     GI/Hepatic: Comment: Cholangitis/Choledocholithiasis    (+) GERD,         cholecystitis/cholelithiasis,          Renal/Genitourinary: Comment: Urinary retention - bladder stimulator in place      Endo:     (+)  type II DM,             Obesity,      "  Psychiatric/Substance Use:     (+) psychiatric history bipolar       Infectious Disease:       Malignancy:       Other:      (+)  , H/O Chronic Pain,           Physical Exam  Airway  Mallampati: III  TM distance: >3 FB  Neck ROM: full  Mouth opening: < 4 cm    Cardiovascular - normal exam   Dental   (+) Minor Abnormalities - some fillings, tiny chips      Pulmonary (+) decreased breath sounds   decreased breath sounds     Neurological - normal exam  She appears awake, alert and oriented x3.    Other Findings       OUTSIDE LABS:  CBC:   Lab Results   Component Value Date    WBC 17.0 (H) 06/09/2025    WBC 6.9 02/01/2025    HGB 11.8 06/09/2025    HGB 12.5 02/01/2025    HCT 35.4 06/09/2025    HCT 38.1 02/01/2025     06/09/2025     02/01/2025     BMP:   Lab Results   Component Value Date     06/09/2025     02/01/2025    POTASSIUM 4.1 06/09/2025    POTASSIUM 3.4 02/01/2025    CHLORIDE 102 06/09/2025    CHLORIDE 99 02/01/2025    CO2 18 (L) 06/09/2025    CO2 26 02/01/2025    BUN 11.7 06/09/2025    BUN 10.0 02/01/2025    CR 0.76 06/09/2025    CR 0.74 02/01/2025    GLC 84 06/09/2025    GLC 89 06/09/2025     COAGS: No results found for: \"PTT\", \"INR\", \"FIBR\"  POC:   Lab Results   Component Value Date     (H) 06/23/2011    HCG Negative 09/02/2010     HEPATIC:   Lab Results   Component Value Date    ALBUMIN 3.1 (L) 06/09/2025    PROTTOTAL 5.1 (L) 06/09/2025     (H) 06/09/2025     (H) 06/09/2025    ALKPHOS 82 06/09/2025    BILITOTAL 1.9 (H) 06/09/2025     OTHER:   Lab Results   Component Value Date    LACT 1.5 06/09/2025    A1C 6.2 (H) 06/09/2025    SYED 7.5 (L) 06/09/2025    LIPASE 42 02/01/2025    TSH 4.82 02/10/2010       Anesthesia Plan    ASA Status:  3      NPO Status: NPO Appropriate   Anesthesia Type: MAC.  Airway: natural airway.  Induction: intravenous.   Techniques and Equipment:       - Monitoring Plan: standard ASA monitoring     Consents    Anesthesia Plan(s) and " "associated risks, benefits, and realistic alternatives discussed. Questions answered and patient/representative(s) expressed understanding.     - Discussed: CRNA, anesthesiologist     - Discussed with:  Patient               Postoperative Care    Pain management: multimodal analgesia.     Comments:                   Simone Eugene, DO, DO    I have reviewed the pertinent notes and labs in the chart from the past 30 days and (re)examined the patient.  Any updates or changes from those notes are reflected in this note.    Clinically Significant Risk Factors Present on Admission               # Hypoalbuminemia: Lowest albumin = 3.1 g/dL at 6/9/2025  7:04 AM, will monitor as appropriate   # Drug Induced Platelet Defect: home medication list includes an antiplatelet medication   # Hypertension: Home medication list includes antihypertensive(s)           # Overweight: Estimated body mass index is 29.23 kg/m  as calculated from the following:    Height as of this encounter: 1.6 m (5' 3\").    Weight as of this encounter: 74.8 kg (165 lb).                    "

## 2025-06-09 NOTE — ANESTHESIA POSTPROCEDURE EVALUATION
Patient: Natty Bateman    Procedure: Procedure(s):  ENDOSCOPIC RETROGRADE CHOLANGIOPANCREATOGRAPHY, COMPLEX       Anesthesia Type:  MAC    Note:  Disposition: Inpatient   Postop Pain Control: Uneventful            Sign Out: Well controlled pain   PONV: No   Neuro/Psych: Uneventful            Sign Out: Acceptable/Baseline neuro status   Airway/Respiratory: Uneventful            Sign Out: Acceptable/Baseline resp. status   CV/Hemodynamics: Uneventful            Sign Out: Acceptable CV status   Other NRE: NONE   DID A NON-ROUTINE EVENT OCCUR?            Last vitals:  Vitals Value Taken Time   /73 06/09/25 13:30   Temp 35.8  C (96.5  F) 06/09/25 13:20   Pulse 85 06/09/25 13:40   Resp 22 06/09/25 13:40   SpO2 94 % 06/09/25 13:39   Vitals shown include unfiled device data.    Electronically Signed By: Johnny Bland MD  June 9, 2025  1:47 PM

## 2025-06-09 NOTE — PROGRESS NOTES
[facility]    Medicine Progress Note - Hospitalist Service    Date of Admission:  6/8/2025  Interval History   Patient transferred from outside hospital with sepsis and concerns of ascending cholangitis.  This a.m. her main complaint is the fact that she has a headache.  She has a history of chronic pain and migraines.  She is followed by neurology clinic for her migraines.  Takes Imitrex both oral and injection.  Seen by GI already with plans for ERCP. on cefepime and Flagyl  Has questions about current plan and diagnoses.  Dr. Huerta called outside hospital for blood culture results which currently have no update  Patient declined getting second set of blood cultures prior to her ERCP    Assessment & Plan   Natty Bateman is a 54 year old female  type 2 diabetes mellitus, bipolar disorder, hypertension, hyperlipidemia, IBS, urinary retention s/p bladder stimulator, chronic pain, chronic and intractable migraine, GERD, and insomnia presented [place] [id number] ED with worsening lower chest /upper abdominal discomfort.      Patient presented to [place] ER with complaints of chest discomfort across the bra line that slowly began around 10 AM and worsened through the day.  Had gotten up to go to work and felt worse. ?  Slight shortness of breath.  Felt nauseous.  History of recurrent bladder infections and kidney stones.  No fever or chills.  Vitals showed a blood pressure 146/81 with a pulse of 131 temp of 90  Lactate returned at 4.1 with a repeat of 5.1.  Urine negative.  BMP sodium 130 potassium 3.6 carbon dioxide 25 with a creatinine of 0.76: Elevated liver test with a bilirubin of 2.3, , , alk phos 101 and lipase normal 172: Troponin normal white count 11.3 hemoglobin 12 D-dimer elevated at 1.94  CT abdomen pelvis showing a dilated gallbladder with new intra and extrahepatic biliary ductal dilatation with common bile duct 12 mm, 6.7 mm high density filling defect in the  CBD compatible with choledocholithiasis.  Pancreas with no ductal dilatation: No other acute findings.  A CT angio of her chest was also done given elevated D-dimer showing no PE.  Ultimately she did develop a fever in the ER: She was started on cefepime and Flagyl.  Transferred to [facility] given sepsis with concerns of ascending cholangitis and need for ERCP    Sepsis  Ascending cholangitis with sepsis  Patient with presentation to outside hospital and clinical presentation of sepsis with lactic acidosis 5.1, leukocytosis, elevated liver tests as above, tachycardic to 131 >> started on treatment for sepsis.  CT abdomen pelvis showing new intra and extrahepatic biliary ductal dilation with CBD 12 and 6.7 mm density filling defect in the CBD  6/9 Patient with RUQ tenderness seen this morning.  GI as well as seen the patient was plans for ERCP  Continue cefepime and Flagyl.  Additional fluids given sepsis.  Received 1 L prior to arrival >> given 900 cc overnight>> give 1 L saline bolus and increase IV fluids to 125  6/9 lactate of 1.5 on repeat this am.   6/9 ERCP with choledocholithiasis.  Major papilla appeared congested.  Complete removal with biliary sphincterotomy and balloon extraction.  Biliary sphincterotomy was performed.  Biliary tree was swept and pus and sludge were found.  One temporary stent was placed into the common bile duct.  6/9 continue on cefepime and Flagyl  GI okay to advance diet as tolerated.  ERCP 3 months to remove stent  Follow-up office 2 weeks  Will need to call outside hospital to evaluate if blood cultures remain negative.  She declined a second set of blood cultures while in the preinduction area      Sepsis   Elevated ddimer outside hospital  6/8 CT angio chest PE study no acute intrathoracic findings.  No PE.  No pleural effusions or pneumothorax.    Hypochloremic hyponatremia  Na 130, Cl 93 at 5pm 6/8 at [facility]  IVF continued  Monitor BMP  Currently on NS +20 of KCL  at 125      Migraine  Seen by neurology as an outpatient with intractable chronic migraines.  As outlined in neurology notes she has had multiple prior meds for her migraines.  Had Botox done 4/8/2025   She reports taking her Relpax before she came to [facility] at the ER  Given as needed dose of Imitrex here   Still reporting her headaches are bad.  Would not continue to use triptans back-to-back will get a neurology consult  Noted patient has polypharmacy of migraine medications. (Ubrelvy, Imitrex injection, Imitrex tablet, Qulipta)   Neurology consult    IBS  Prior to admission on multiple medications including Bentyl 10 mg 3 times daily, Linzess every morning, Phenergan for nausea, Mounjaro for weight loss)  Currently awaiting return of her GI function will hold on multiple bowel regimen       T2DM  A1c 6.1% recently, follows with Endo at [facility]  PTA mounjaro noted - previously on ozempic  hold PTA mounjaro  Sliding scale insulin      Hypertension  Hyperlipidemia  vitals to be updated  PTA clonidine to resume as long as BP acceptable if/when verified  PTA statin held for now  6/9 blood pressure borderline lower with sepsis.  She is on clonidine 0.2 twice daily (and not getting reflex hypertension suggestive of lower than normal blood pressure)      Bipolar disorder  Stable upon admission  Resume prior to admission meds not able to take p.o. after procedure  Seroquel 50 mg at bedtime  Vilazodone 40 mg po q am      GERD  PPI continue      Chronic pain syndrome  - PTA regimen to continue as appropriate with acute pain regimen as above          Diet: NPO for Medical/Clinical Reasons Except for: Ice Chips, Meds    DVT Prophylaxis: Pneumatic Compression Devices: No anticoagulation with recent ERCP  Dent Catheter: Not present  Lines: None     Cardiac Monitoring: ACTIVE order. Indication: Electrolyte Imbalance (24 hours)- Magnesium <1.3 mg/ml; Potassium < =2.8 or > 5.5 mg/ml  Code Status: No CPR- Do NOT Intubate   "    Clinically Significant Risk Factors Present on Admission               # Hypoalbuminemia: Lowest albumin = 3.1 g/dL at 6/9/2025  7:04 AM, will monitor as appropriate     # Hypertension: Home medication list includes antihypertensive(s)           # Overweight: Estimated body mass index is 29.23 kg/m  as calculated from the following:    Height as of this encounter: 1.6 m (5' 3\").    Weight as of this encounter: 74.8 kg (165 lb).              Social Drivers of Health    Depression: Not at risk (4/28/2025)    [facility]    PHQ-2     PHQ-2 Total: 2   Recent Concern: Depression - At risk (1/28/2025)    [facility]    PHQ-2     PHQ-2 Total: 3   Housing Stability: Unknown (1/28/2025)    [facility]    Housing Stability Vital Sign     Unable to Pay for Housing in the Last Year: No     Homeless in the Last Year: No   Physical Activity: Inactive (10/10/2023)    [facility]    Exercise Vital Sign     Days of Exercise per Week: 0 days     Minutes of Exercise per Session: 0 min   Stress: Stress Concern Present (10/10/2023)    [facility]    [employer] - Occupational Stress Questionnaire     Feeling of Stress : Very much          Disposition Plan     Medically Ready for Discharge: Anticipated in 2-4 Days         GABRIELA LONGORIA MD  Hospitalist Service  [facility]  Securely message with [employer] (more info)  Text page via [employer] Paging/Directory   ______________________________________________________________________    Physical Exam   Vital Signs: Temp: 99.8  F (37.7  C) Temp src: Oral BP: 107/55 Pulse: 93   Resp: 20 SpO2: 94 % O2 Device: None (Room air)    Weight: 165 lbs 0 oz    General Appearance: Patient was seen earlier prior to " ERCP with her sunglasses on laying in bed  Respiratory: Decreased breath sounds but otherwise clear  Cardiovascular: Regular rate and rhythm without gallop rub normal S1-S2  GI: Positive bowel sounds soft with midepigastric right upper quadrant tenderness  Skin: No edema      Medical Decision Making       50 MINUTES SPENT BY ME on the date of service doing chart review, history, exam, documentation & further activities per the note.    Evaluation and treatment for sepsis, migraine, multiple evaluations for patient with lower blood pressure, headache    Data     I have personally reviewed the following data over the past 24 hrs:    17.0 (H)  \   11.8   / 261     136 102 11.7 /  115 (H)   4.1 18 (L) 0.76 \     ALT: 160 (H) AST: 171 (H) AP: 82 TBILI: 1.9 (H)   ALB: 3.1 (L) TOT PROTEIN: 5.1 (L) LIPASE: N/A     TSH: N/A T4: N/A A1C: 6.2 (H)     Procal: N/A CRP: N/A Lactic Acid: 1.5         Imaging results reviewed over the past 24 hrs:   Recent Results (from the past 24 hours)   CTA Chest with Contrast    Narrative    EXAM: CT ANGIO CHEST PE  LOCATION: [facility]  DATE: 6/8/2025    INDICATION: Chest pain.  COMPARISON: 2/5/24  TECHNIQUE: CT chest pulmonary angiogram during arterial phase injection of IV  contrast. Multiplanar reformats and MIP reconstructions were performed. Dose  reduction techniques were used.   CONTRAST: omni 350 100ml    FINDINGS:  ANGIOGRAM CHEST: Mild motion artifact. No pulmonary embolism. Nonaneurysmal  aorta without dissection.    LUNGS AND PLEURA: Lungs are clear. No pleural effusion or pneumothorax.    MEDIASTINUM/AXILLAE: No adenopathy. Normal heart size. No pericardial effusion.  Mildly patulous esophagus; correlate for dysmotility.    CORONARY ARTERY CALCIFICATION: Motion artifact.    UPPER ABDOMEN: Please see same-day CT abdomen-pelvis. Incompletely seen  distended gallbladder and common bile duct dilatation.    MUSCULOSKELETAL: Nothing acute.    IMPRESSION:    1.  No acute  intrathoracic findings to explain symptoms.    2.  No pulmonary embolism.    Electronically signed by: Dayne Dosbon DO 6/8/2025 7:32 PM CDT   CT Abdomen Pelvis w Contrast    Narrative    EXAM: CT ABDOMEN PELVIS W IV CONTRAST  LOCATION: [place] [facility]  DATE: 6/8/2025    INDICATION: Sepsis  COMPARISON: 2/5/24  TECHNIQUE: CT scan of the abdomen and pelvis was performed following injection  of IV contrast. Multiplanar reformats were obtained. Dose reduction techniques  were used.  CONTRAST: omni 350 100ml    FINDINGS:   LOWER CHEST: No basilar infiltrates    HEPATOBILIARY: Dilated gallbladder. New intra and extra hepatic biliary ductal  dilatation with the common duct reaching 12 mm. 6.7 mm high density filling  defect in the common bile duct compatible with choledocholithiasis (series 4  image 48).    PANCREAS: No ductal dilatation or peripancreatic fluid collections    SPLEEN: Unremarkable    ADRENAL GLANDS: Unremarkable    KIDNEYS/BLADDER: No hydronephrosis or suspicious renal mass lesions. Normal  bladder contour.    BOWEL: Normal caliber appendix.    LYMPH NODES: No significant retroperitoneal adenopathy    VASCULATURE: No abdominal aortic aneurysm    PELVIC ORGANS: Hysterectomy. No free fluid    MUSCULOSKELETAL: Sacral stimulator. No acute bony abnormalities.    IMPRESSION:   1.  Choledocholithiasis with new significant intra and extrahepatic biliary  ductal dilatation.    Electronically signed by: López Trinidad MD 6/8/2025 7:33 PM CDT   XR Surgery KESHA L/T 5 Min Fluoro w Stills    Narrative    This exam was marked as non-reportable because it will not be read by a   radiologist or a [place] non-radiologist provider.

## 2025-06-09 NOTE — CONSULTS
[facility]    Neurology Consultation     Date of Admission:  6/8/2025    Assessment & Plan   Natty Bateman is a 54 year old female who was admitted on 6/8/2025. I was asked to see the patient for migraines.  The patient is a 54-year-old lady with intractable chronic headaches, migraine and probably other type of headaches.  She was admitted with worsening headaches in the context of sepsis due to ascending cholangitis.  Overall the patient feels much better after the procedure today.      - Since the patient is improving, has negative Brudzinski sign, I would not recommend to pursue lumbar puncture.  The patient herself does not want to have a lumbar puncture either.    -I would recommend to continue with the medication as an outpatient for her migraine and to follow-up in neurology clinic for further adjustment of her medications.  The patient has been on so many medication and tried so many others it will be best for her to follow-up with Dr. Ricardo and DEBBY Galeano who would know her the best.    Neurology will sign off if there are any questions or concerns please call us.      Concepcion Fernandez MD    Code Status    No CPR- Do NOT Intubate    Reason for Consult   Reason for consult: I was asked by Dr Huerta to evaluate this patient for mirgraine.    Primary Care Physician   Bakari Castellanos    Chief Complaint   headaches    History is obtained from the patient and electronic health record    History of Present Illness   Natty Bateman is a 54 year old female who presents with very severe chest pain and headaches.  She was found to have ascending cholangitis with sepsis.  The patient has had a procedure, ERCP today and she states that she feels better.    The patient reports intractable headaches, she has been followed in neurology clinic by Dr. Ricardo in [place].    The patient describes headache that got worse 2 or 3 weeks ago.  The pain has been more severe.  She  "has been on a lot of medication as mentioned above.    She was recently started on new medications.  She has had Botox injection 2 months ago which has not been helpful.    She had a injection with with [facility] about a week ago    \"CURRENT MIGRAINE PREVENTION: vyepti not fully effective by itself   CURRENT ABORTIVE TREATMENT: nurtec helps a small amount, robaxin, promethazine, zofran, sumatriptan injectable helps, rizatriptan although oral does not seem to help, promethazine, zofran, methocarbamol (qhs), codeine (2-3 per day-prescribed at [facility]), sumatriptan tabs, ibuprofen   PAST PREVENTION TRIAL: qulipta caused serotonin syndrome, botox in 2021 was minimally effective, emgality (IE), aimovig (IE/benefit wore off), Propranolol, amitriptyline 2012 (IE), escitalopram, duloxetine, gabapentin (SE-making diabetes worse), topiramate (stopped d/t kidney stones but later given approval by her urologist, Dr. Almazan, to restart the topamax as her increased salt intake was the cause of calcium accumulating in her urine/not the topamax)-topamax was IE   OTHER MODALITIES TRIED: PT at [facility], dry needling helps   Baseline headaches 30/30 days, 20 severe migraines   Comorbidities significantly impacting responses to migraine therapy: Chronic pain/daily use of codeine for other pain condition, comorbid psychiatric disease, poor sleep patterns (sleep study in the past 2019 was negative for sleep apnea)  -original head injury was part of a suicide attempt  -issue with coccyx fracture/chronic pain - sacral/bladder stimulator placed in April of 2023 - MRI compatible - chronic pain followed by [facility]   BOTOX STARTED: 1/19/21 and 4/13/21 - stopped after 2 sessions as it provided only small amount of relief - retrial of botox again in 2025 - restarted on 4/8/25   Baseline headaches 30/30 days, 20 severe migraine\"    MRI of the brain done in February was a normal study.     Past Medical History   I have reviewed this patient's " medical history and updated it with pertinent information if needed.   Past Medical History:   Diagnosis Date    Abnormal glucose     ddd     cervical    Depression     Insomnia     Leiomyoma of uterus, unspecified 2007    Migraine headaches     Obesity 2009    Overweight(278.02)        Past Surgical History   I have reviewed this patient's surgical history and updated it with pertinent information if needed.  Past Surgical History:   Procedure Laterality Date    HC REPAIR ROTATOR CUFF,ACUTE  2-    TONSILLECTOMY  age 33    TUBAL LIGATION  age 24    ZZC TOTAL ABDOM HYSTERECTOMY  -    has ovaries, myomatous uterus       Prior to Admission Medications   Prior to Admission Medications   Prescriptions Last Dose Informant Patient Reported? Taking?   ACE/ARB NOT PRESCRIBED, INTENTIONAL,  Self Yes No   Sig: by Other route continuous prn.   Atogepant (QULIPTA) 60 MG tablet 2025 Morning Self Yes Yes   Sig: Take 60 mg by mouth daily.   Blood Glucose Monitoring Suppl (ONE TOUCH ULTRA 2) W/DEVICE KIT  Self No No   Si Device daily.   DIABETIC STERILE LANCETS device  Self No No   Si Device daily.   Lidocaine (LIDOCARE) 4 % Patch 2025 Evening  Yes Yes   Sig: Place 1 patch onto the skin as needed for moderate pain. To prevent lidocaine toxicity, patient should be patch free for 12 hrs daily.   MAGNESIUM GLYCINATE PO 2025 Self Yes Yes   Sig: Take by mouth every evening. Pt unsure of dose.   Magnesium Citrate 100 MG TABS 2025 Morning Self Yes Yes   Sig: Take by mouth every morning. Pt unsure of dose.   QUEtiapine (SEROQUEL) 50 MG tablet 2025 Bedtime Self Yes Yes   Sig: Take 50 mg by mouth at bedtime.   SUMAtriptan (IMITREX) 100 MG tablet 2025 Self Yes Yes   Sig: Take 100 mg by mouth at onset of headache for migraine. May repeat after 2 hours if necessary, not to exceed 200mg in 24hrs   SUMAtriptan (IMITREX) 6 MG/0.5ML injection 2025 Self Yes Yes   Sig: Inject 6 mg subcutaneously at  onset of headache for migraine. May repeat 1 hour after the first dose. Do not exceed 2 doses per 24 hours*   Vitamin D3 (VITAMIN D, CHOLECALCIFEROL,) 25 mcg (1000 units) tablet 6/7/2025 Self Yes Yes   Sig: Take 25 mcg by mouth daily.   acetaminophen-codeine (TYLENOL #3) 300-30 MG per tablet 6/7/2025 Self No Yes   Sig: Take 1-2 tablets by mouth every 6 hours as needed for pain.   Patient taking differently: Take 1 tablet by mouth every 6 hours as needed for pain.   aluminum chloride (DRYSOL) 20 % external solution More than a month Self Yes Yes   Sig: Apply topically at bedtime.   aspirin (ASA) 81 MG chewable tablet 6/7/2025 Self Yes Yes   Sig: Take 81 mg by mouth daily.   calcium polycarbophil (FIBERCON) 625 MG tablet 6/7/2025 Evening Self Yes Yes   Sig: Take 1 tablet by mouth every evening.   cloNIDine (CATAPRES) 0.1 MG tablet 6/7/2025 Evening Self Yes Yes   Sig: Take 0.2 mg by mouth 2 times daily.   diclofenac (VOLTAREN) 75 MG EC tablet 6/7/2025 Self Yes Yes   Sig: Take 75 mg by mouth 2 times daily as needed for moderate pain.   dicyclomine (BENTYL) 10 MG capsule 6/8/2025 Morning Self Yes Yes   Sig: Take 10 mg by mouth 3 times daily (before meals). TAKE 1 CAPSULE BY MOUTH THREE TIMES A DAY BEFORE MEALS. TAKE AS NEEDED FOR ABDOMINAL PAIN/CRAMPING   estradiol (ESTRACE) 1 MG tablet 6/8/2025 Self Yes Yes   Sig: Take 1 mg by mouth daily.   hydrOXYzine HCl (ATARAX) 50 MG tablet 6/8/2025 Morning Self Yes Yes   Sig: Take 50 mg by mouth See Admin Instructions. Take 1 tablet by mouth twice a day and 2 tablets at bedtime.   linaclotide (LINZESS) 72 MCG capsule 6/8/2025 Morning Self Yes Yes   Sig: Take 72 mcg by mouth every morning (before breakfast).   metFORMIN (GLUCOPHAGE-XR) 750 MG 24 hr tablet 6/7/2025 Morning Self Yes Yes   Sig: Take 750 mg by mouth daily (with breakfast).   methocarbamol (ROBAXIN) 750 MG tablet 6/7/2025 Evening Self Yes Yes   Sig: Take 750 mg by mouth nightly as needed for muscle spasms.   minoxidil  (LONITEN) 2.5 MG tablet 6/8/2025 Morning Self Yes Yes   Sig: Take 1.25 mg by mouth daily.   multivitamin, therapeutic (THERA-VIT) TABS tablet 6/7/2025 Morning Self Yes Yes   Sig: Take 1 tablet by mouth daily.   omeprazole (PRILOSEC) 40 MG DR capsule 6/7/2025 Evening Self Yes Yes   Sig: Take 40 mg by mouth 2 times daily.   promethazine (PHENERGAN) 12.5 MG tablet 6/7/2025 Evening Self Yes Yes   Sig: Take 12.5 mg by mouth 2 times daily as needed for nausea.   rosuvastatin (CRESTOR) 5 MG tablet 6/7/2025 Bedtime Self Yes Yes   Sig: Take 5 mg by mouth at bedtime.   sennosides (SENOKOT) 8.6 MG tablet 6/7/2025 Bedtime Self Yes Yes   Sig: Take 2 tablets by mouth at bedtime.   tirzepatide (MOUNJARO) 10 MG/0.5ML SOAJ auto-injector pen 6/4/2025 Morning Self Yes Yes   Sig: Inject 10 mg subcutaneously once a week.   ubrogepant (UBRELVY) 100 MG tablet 6/7/2025 Self Yes Yes   Sig: Take 100 mg by mouth at onset of headache (for migraine). May repeat after 2 hrs as needed. Max of 200 mg in 24 hours.   vilazodone (VIIBRYD) 40 MG TABS tablet 6/8/2025 Morning Self Yes Yes   Sig: Take 40 mg by mouth every morning.      Facility-Administered Medications: None     Allergies   Allergies   Allergen Reactions    Adhesive Tape Dermatitis    Depakene [Valproic Acid] Other (See Comments)    Fish Oil Rash    Geodon [Ziprasidone] Other (See Comments)    Tegaderm Transparent Dressing (Informational Only) Dermatitis    Vortioxetine Itching       Social History   I have reviewed this patient's social history and updated it with pertinent information if needed. Natty Bateman  reports that she has never smoked. She does not have any smokeless tobacco history on file. She reports current alcohol use.    Family History   I have reviewed this patient's family history and updated it with pertinent information if needed.   Family History   Adopted: Yes   Problem Relation Age of Onset    Breast Cancer Maternal Aunt     Hypertension No family hx of      Diabetes No family hx of        Review of Systems   The 10 point Review of Systems is negative other than noted in the HPI or here.     Physical Exam   Temp: 98.1  F (36.7  C) Temp src: Oral BP: 109/57 Pulse: 80   Resp: 17 SpO2: 100 % O2 Device: None (Room air) Oxygen Delivery: 4 LPM  Vital Signs with Ranges  Temp:  [96.5  F (35.8  C)-100.4  F (38  C)] 98.1  F (36.7  C)  Pulse:  [] 80  Resp:  [16-23] 17  BP: ()/(55-99) 109/57  SpO2:  [93 %-100 %] 100 %  165 lbs 0 oz    Constitutional: Normal, the patient wears sunglasses inside  Eyes: No conjunctival erythema  ENT: Neck is supple, Brudzinski sign is absent  Skin: No obvious lesions  Musculoskeletal: No pedal edema  The patient is alert oriented x3 no acute distress.  Speech is fluent there is no aphasia apraxia or agnosia.  Attention and memory are normal.  Pupils are equal round reactive to light extraocular movements are intact, there is no nystagmus.  Facial muscles are equal bilaterally.  Uvula and tongue are midline.  Muscular mass tone and strength are normal in all 4 extremities there is no pronator drift.  Reflexes are present symmetric in upper and lower extremities.  Babinski is absent.    Sensory exam is normal to light touch and vibratory sense.    Finger-nose-finger without dysmetria.  Fine movements are normal.    Gait was deferred  Neuropsychiatric: Somewhat depressed    Data   Results for orders placed or performed during the hospital encounter of 06/08/25 (from the past 24 hours)   Glucose by meter   Result Value Ref Range    GLUCOSE BY METER POCT 114 (H) 70 - 99 mg/dL   Glucose by meter   Result Value Ref Range    GLUCOSE BY METER POCT 98 70 - 99 mg/dL   CBC with platelets differential    Narrative    The following orders were created for panel order CBC with platelets differential.  Procedure                               Abnormality         Status                     ---------                               -----------          ------                     CBC with platelets and ...[[id number]]  Abnormal            Final result                 Please view results for these tests on the individual orders.   Hemoglobin A1c   Result Value Ref Range    Estimated Average Glucose 131 (H) <117 mg/dL    Hemoglobin A1C 6.2 (H) <5.7 %   Comprehensive metabolic panel   Result Value Ref Range    Sodium 136 135 - 145 mmol/L    Potassium 4.1 3.4 - 5.3 mmol/L    Carbon Dioxide (CO2) 18 (L) 22 - 29 mmol/L    Anion Gap 16 (H) 7 - 15 mmol/L    Urea Nitrogen 11.7 6.0 - 20.0 mg/dL    Creatinine 0.76 0.51 - 0.95 mg/dL    GFR Estimate >90 >60 mL/min/1.73m2    Calcium 7.5 (L) 8.8 - 10.4 mg/dL    Chloride 102 98 - 107 mmol/L    Glucose 98 70 - 99 mg/dL    Alkaline Phosphatase 82 40 - 150 U/L     (H) 0 - 45 U/L     (H) 0 - 50 U/L    Protein Total 5.1 (L) 6.4 - 8.3 g/dL    Albumin 3.1 (L) 3.5 - 5.2 g/dL    Bilirubin Total 1.9 (H) <=1.2 mg/dL   Lactic acid whole blood   Result Value Ref Range    Lactic Acid 1.5 0.7 - 2.0 mmol/L   CBC with platelets and differential   Result Value Ref Range    WBC Count 17.0 (H) 4.0 - 11.0 10e3/uL    RBC Count 4.25 3.80 - 5.20 10e6/uL    Hemoglobin 11.8 11.7 - 15.7 g/dL    Hematocrit 35.4 35.0 - 47.0 %    MCV 83 78 - 100 fL    MCH 27.8 26.5 - 33.0 pg    MCHC 33.3 31.5 - 36.5 g/dL    RDW 12.8 10.0 - 15.0 %    Platelet Count 261 150 - 450 10e3/uL    % Neutrophils 92 %    % Lymphocytes 4 %    % Monocytes 3 %    % Eosinophils 0 %    % Basophils 0 %    % Immature Granulocytes 1 %    NRBCs per 100 WBC 0 <1 /100    Absolute Neutrophils 15.7 (H) 1.6 - 8.3 10e3/uL    Absolute Lymphocytes 0.6 (L) 0.8 - 5.3 10e3/uL    Absolute Monocytes 0.5 0.0 - 1.3 10e3/uL    Absolute Eosinophils 0.0 0.0 - 0.7 10e3/uL    Absolute Basophils 0.1 0.0 - 0.2 10e3/uL    Absolute Immature Granulocytes 0.2 <=0.4 10e3/uL    Absolute NRBCs 0.0 10e3/uL   Glucose by meter   Result Value Ref Range    GLUCOSE BY METER POCT 89 70 - 99 mg/dL   ENDOSCOPIC  RETROGRADE CHOLANGIOPANCREATOGRAPHY   Result Value Ref Range    ERCP       [facility]  [address] [place]  [address]  _______________________________________________________________________________  Patient Name: Natty Bateman            Procedure Date: 6/9/2025 10:49 AM  MRN: [id number]                       Account Number: [id number]  YOB: 1970               Admit Type: Inpatient  Age: 54                               Room: [place] [address]  Note Status: Finalized                Attending MD: FERNANDO LUCIANO MD,   Instrument Name: 502 Q190V ERCP         _______________________________________________________________________________     Procedure:                ERCP  Indications:              Common bile duct stone(s), Ascending cholangitis,                             Jaundice  Providers:                FERNANDO LUCIANO MD, Luanne Carrington, RN,                             Fany Rogers RN, Beba Rodriguez MD:               Medicines:                Monitored Anesthesia Care  Complications:            No immediate  complications.  _______________________________________________________________________________  Procedure:                Pre-Anesthesia Assessment:                            - Prior to the procedure, a History and Physical                             was performed, and patient medications and                             allergies were reviewed. The patient is competent.                             The risks and benefits of the procedure and the                             sedation options and risks were discussed with the                             patient. All questions were answered and informed                             consent was obtained. Patient identification and                             proposed procedure were verified by the physician                             in the pre-procedure area. Mental Status                              Examination: alert and oriented. Airway                             Examination: normal oropharyngeal airway and neck                              mobility. Respiratory Examination: clear to                             auscultation. CV Examination: normal. Prophylactic                             Antibiotics: The patient does not require                             prophylactic antibiotics. Prior Anticoagulants: The                             patient has taken no anticoagulant or antiplatelet                             agents. ASA Grade Assessment: II - A patient with                             mild systemic disease. After reviewing the risks                             and benefits, the patient was deemed in                             satisfactory condition to undergo the procedure.                             The anesthesia plan was to use moderate sedation /                             analgesia (conscious sedation). Immediately prior                             to administration of medications, the patient was                             re-assessed for adequacy to receive sedatives. The                              heart rate, respiratory rate, oxygen saturations,                             blood pressure, adequacy of pulmonary ventilation,                             and response to care were monitored throughout the                             procedure. The physical status of the patient was                             re-assessed after the procedure.                            After obtaining informed consent, the scope was                             passed under direct vision. Throughout the                             procedure, the patient's blood pressure, pulse, and                             oxygen saturations were monitored continuously. The                             Duodenoscope was introduced through the mouth, and                             used to inject contrast into and used to inject                              contrast into the bile duct. The ERCP was                             accomplished without difficulty. The patient                             tolerated the proce dure well.                                                                                   Findings:       The  film was normal. The esophagus was successfully intubated        under direct vision without detailed examination of the pharynx, larynx,        and associated structures, and upper GI tract. The upper GI tract was        grossly normal. The major papilla was located entirely within a        diverticulum. The major papilla was congested. The bile duct was deeply        cannulated with the needle knife using a freehand technique. Contrast        was injected. I personally interpreted the bile duct images. There was        brisk flow of contrast through the ducts. Image quality was excellent.        Contrast extended to the biliary pancreatic junction. Contrast extended        to the main bile duct. Contrast extended to the cystic duct.        Opacification of the main bile duct was successful. The maximum diameter        of the ducts was 10 mm. The middle third of the m ain bile duct contained        one stone. A short 0.035 inch Soft Jagwire was passed into the biliary        tree. An 8 mm biliary sphincterotomy was made with a monofilament        traction (standard) sphincterotome using ERBE electrocautery. There was        no post-sphincterotomy bleeding. The biliary tree was swept with a 12 mm        balloon starting at the bifurcation. Sludge was swept from the duct. All        stones were removed. Pus was swept from the duct. Sludge was swept from        the duct. One 10 Fr by 7 cm temporary stent with a single external flap        and a single internal flap was placed 6 cm into the common bile duct.        Bile flowed through the stent. The stent was in good position.                                                                                    Impression:               - The major papilla was located entirely within a                             diverticulum.                            - The major papilla appeared congested.                             - Choledocholithiasis was found. Complete removal                             was accomplished by biliary sphincterotomy and                             balloon extraction.                            - A biliary sphincterotomy was performed.                            - The biliary tree was swept and pus and sludge                             were found.                            - One temporary stent was placed into the common                             bile duct.  Recommendation:           - Return patient to hospital [facility] for ongoing care.                            - Advance diet as tolerated.                            - Continue present medications.                            - Return to my office in 2 weeks.                            - Repeat ERCP in 3 months to remove stent.                                                                                   Procedure Code(s):       --- Professional ---       13446, Endoscopic retrograde cholangiopancreatography (E RCP); with        placement of endoscopic stent into biliary or pancreatic duct, including        pre- and post-dilation and guide wire passage, when performed, including        sphincterotomy, when performed, each stent       28988, Endoscopic retrograde cholangiopancreatography (ERCP); with        removal of calculi/debris from biliary/pancreatic duct(s)  Diagnosis Code(s):       --- Professional ---       K80.30, Calculus of bile duct with cholangitis, unspecified, without        obstruction       K83.09, Other cholangitis       R17, Unspecified jaundice       K83.8, Other specified diseases of biliary tract    CPT copyright 2022 [employer]. All rights reserved.    The codes  documented in this report are preliminary and upon  review may   be revised to meet current compliance requirements.    Electronically Signed by Alberto Salgado  ________________________  ALBERTO LUCIANO MD  6/9/2025 2:39:27 PM  I was physically present for the entire viewing portion o f the exam.  ALBERTO LUCIANO MD  Number of Addenda: 0    Note Initiated On: 6/9/2025 10:49 AM  Total Procedure Duration: 0 hours 17 minutes 1 second   Scope In: 12:44:06 PM  Scope Out: 1:01:07 PM     Glucose by meter   Result Value Ref Range    GLUCOSE BY METER POCT 84 70 - 99 mg/dL   Glucose by meter   Result Value Ref Range    GLUCOSE BY METER POCT 115 (H) 70 - 99 mg/dL   XR Surgery KESHA L/T 5 Min Fluoro w Stills    Narrative    This exam was marked as non-reportable because it will not be read by a   radiologist or a [place] non-radiologist provider.

## 2025-06-09 NOTE — ANESTHESIA CARE TRANSFER NOTE
Patient: Natty Bateman    Procedure: Procedure(s):  ENDOSCOPIC RETROGRADE CHOLANGIOPANCREATOGRAPHY, COMPLEX       Diagnosis: Choledocholithiasis [K80.50]  Cholangitis (H) [K83.09]  Diagnosis Additional Information: No value filed.    Anesthesia Type:   MAC     Note:    Oropharynx: oropharynx clear of all foreign objects and spontaneously breathing  Level of Consciousness: awake and drowsy  Oxygen Supplementation: face mask  Level of Supplemental Oxygen (L/min / FiO2): 6  Independent Airway: airway patency satisfactory and stable  Dentition: dentition unchanged  Vital Signs Stable: post-procedure vital signs reviewed and stable  Report to RN Given: handoff report given  Patient transferred to: PACU    Handoff Report: Identifed the Patient, Identified the Reponsible Provider, Reviewed the pertinent medical history, Discussed the surgical course, Reviewed Intra-OP anesthesia mangement and issues during anesthesia, Set expectations for post-procedure period and Allowed opportunity for questions and acknowledgement of understanding      Vitals:  Vitals Value Taken Time   /66 06/09/25 13:16   Temp     Pulse 83 06/09/25 13:18   Resp 17 06/09/25 13:18   SpO2 99 % 06/09/25 13:18   Vitals shown include unfiled device data.    Electronically Signed By: CHAU Blevins CRNA  June 9, 2025  1:19 PM

## 2025-06-09 NOTE — CONSULTS
[facility]  Gastroenterology Consultation         Natty Bateman  [address]   [facility] [id number]  54 year old female    Admission Date/Time: 6/8/2025  Primary Care Provider: Bakari Castellanos  Referring / Attending Physician:  Dr. Don Sidhu    We were asked to see the patient in consultation by Dr. Don Sidhu for evaluation of cholangitis.      CC: abdominal pain, nausea, fever and chills.    HPI:  Natty Bateman is a 54 year old female with extensive PMHx of type 2 diabetes mellitus, bipolar disorder, hypertension, hyperlipidemia, IBS, urinary retention s/p bladder stimulator, chronic pain, migraine, GERD, and insomnia among others who presented to [place] [address] ED with worsening lower chest / upper abdominal discomfort on 6/8/25   Pain started while at work in [place] and became intense as well as nasuea She has had chills but not checked her temperature today followed by uncontrolled shaking.  No change in bowel habits and she denies abdominal pain.  She did have onset of migraine feeling similar to her previous ones.  No recent sick contacts, unusual food ingestion, or travel reported.     States she had abdominal pain Feb 25 and was evaluated for cardiac cause and symptoms resolved. So no further workup pursued. Since Feb 2025 has done well until this weekend.    ROS: A comprehensive ten point review of systems was negative aside from those in mentioned in the HPI.      PAST MED HX:  I have reviewed this patient's medical history and updated it with pertinent information if needed.   Past Medical History:   Diagnosis Date    Abnormal glucose     ddd     cervical    Depression     Insomnia     Leiomyoma of uterus, unspecified 2007    Migraine headaches     Obesity 11/12/2009    Overweight(278.02)        MEDICATIONS:   Prior to Admission Medications   Prescriptions Last Dose Informant Patient Reported? Taking?   ACE/ARB NOT PRESCRIBED, INTENTIONAL,   Yes No    Sig: by Other route continuous prn.   Blood Glucose Monitoring Suppl (ONE TOUCH ULTRA 2) W/DEVICE KIT   No No   Si Device daily.   DIABETIC STERILE LANCETS device   No No   Si Device daily.   MetroNIDazole (FLAGYL) 500 MG tablet   No No   Sig: Take 1 tablet by mouth 3 times daily.   MetroNIDazole (METROGEL) 0.75 % vaginal gel   No No   Sig: Place  vaginally At Bedtime.   acetaminophen-codeine (TYLENOL #3) 300-30 MG per tablet   No No   Sig: Take 1-2 tablets by mouth every 6 hours as needed for pain.   aluminum chloride (DRYSOL) 20 % external solution   No No   Sig: Apply  topically. Use at HS until dry and then 2-3 times a week as directed   aspirin 81 MG tablet   Yes No   Sig: Take 1 tablet by mouth daily.   citalopram (CELEXA) 40 MG tablet   No No   Sig: Take 2 tablets by mouth daily.   eletriptan (RELPAX) 20 MG tablet   Yes No   Sig: Take 1 tablet by mouth at onset of headache for migraine. May repeat in 2 hours if needed: max 2/day; average number of headaches monthly   lorazepam (ATIVAN) 0.5 MG tablet   No No   Sig: Take 2 tablets by mouth 2 times daily as needed for anxiety.   prochlorperazine (COMPAZINE) 5 MG tablet   No No   Sig: Take 1 tablet by mouth every 6 hours as needed for nausea.   quetiapine (SEROQUEL) 25 MG tablet   No No   Sig: Take 1 tablet by mouth nightly as needed.   simvastatin (ZOCOR) 10 MG tablet   No No   Sig: Take 1 tablet by mouth. at bedtime.   topiramate (TOPAMAX) 100 MG tablet   No No   Sig: Take 3 tablets by mouth At Bedtime.      Facility-Administered Medications: None       ALLERGIES:   Allergies   Allergen Reactions    Depakene [Valproic Acid] Other (See Comments)    Fish Oil Rash    Geodon [Ziprasidone] Other (See Comments)    Vortioxetine Itching       SOCIAL HISTORY:  Social History     Tobacco Use    Smoking status: Never   Substance Use Topics    Alcohol use: Yes     Comment: minimal       FAMILY HISTORY:  Family History   Adopted: Yes   Problem Relation Age of  Onset    Breast Cancer Maternal Aunt     Hypertension No family hx of     Diabetes No family hx of        PHYSICAL EXAM:   General  alert, oriented and comfortable  Vital Signs with Ranges  Temp: 99.8  F (37.7  C) Temp src: Oral BP: 107/55 Pulse: 93   Resp: 20 SpO2: 94 % O2 Device: None (Room air)    I/O last 3 completed shifts:  In: 60 [P.O.:60]  Out: 500 [Urine:500]    Constitutional: Alert, oriented to person, place, date, situation.  Cooperative, lying in bed in NAD.   Respiratory:  Lungs CTAB.  No crackles, wheezes, or rhonchi, no labored breathing.  Cardiovascular:  Heart RRR, no MRG, no edema.  GI:  Abdomen soft, RUQ, epigastric tenderness, ND and with normoactive BS  Skin/Integumen:  Warm, dry, non-diaphoretic.  MSK: CMS x4 intact.            ADDITIONAL COMMENTS:   I reviewed the patient's new clinical lab test results.   Recent Labs   Lab Test 06/09/25  0704 02/01/25  1607   WBC 17.0* 6.9   HGB 11.8 12.5   MCV 83 83    280     Recent Labs   Lab Test 02/01/25  1607   POTASSIUM 3.4   CHLORIDE 99   CO2 26   BUN 10.0   ANIONGAP 12     Recent Labs   Lab Test 02/01/25  1607   ALBUMIN 3.9   BILITOTAL 0.4   *   *   LIPASE 42       I reviewed the patient's new imaging results.        CONSULTATION ASSESSMENT AND PLAN:    Natty Bateman is a 54 year old female with extensive history including but not limited to type 2 diabetes mellitus, bipolar disorder, hypertension, hyperlipidemia, IBS, urinary retention s/p bladder stimulator, chronic pain, migraine, GERD, and insomnia among others who presented to [place] [address] ED with worsening lower chest / upper abdominal discomfort, chills, nausea. Noted to have significantly elevated LFTs, CT abdo/pelvis showed choledocholithiasis w/ intra & extrahepatic biliary ductal dilation.     Cholangitis (H)  Choledocholithiasis  Symptoms of chills, upper abdominal discomfort and nausea   CT abdo/pelvis showed choledocholithiasis w/ intra & extrahepatic  biliary ductal dilation.   LFTs AST//216> 171/160 ALP 82 Tbili 2.3>1.9  Leukocytosis 11.3k>17k lactic acid 5.1>4.1>1.5  Findings consistent with choledocholithiasis with cholangitis     - NPO for ERCP today  - Daily LFTs, CBC  - IVF  - antiemetic, analgesic prn  - continue cefepime and metro  - surgery consult            [facility].  Office: [phone]  Cell : [phone] (Dr. Forman)  Cell: [phone] (Bushra Velasquez PA-C)

## 2025-06-10 ENCOUNTER — ANESTHESIA (OUTPATIENT)
Dept: SURGERY | Facility: CLINIC | Age: 55
End: 2025-06-10
Payer: COMMERCIAL

## 2025-06-10 ENCOUNTER — ANESTHESIA EVENT (OUTPATIENT)
Dept: SURGERY | Facility: CLINIC | Age: 55
End: 2025-06-10
Payer: COMMERCIAL

## 2025-06-10 LAB
ALBUMIN SERPL BCG-MCNC: 3.1 G/DL (ref 3.5–5.2)
ALP SERPL-CCNC: 91 U/L (ref 40–150)
ALT SERPL W P-5'-P-CCNC: 120 U/L (ref 0–50)
ANION GAP SERPL CALCULATED.3IONS-SCNC: 13 MMOL/L (ref 7–15)
AST SERPL W P-5'-P-CCNC: 95 U/L (ref 0–45)
BILIRUB DIRECT SERPL-MCNC: 0.42 MG/DL (ref 0–0.3)
BILIRUB SERPL-MCNC: 0.7 MG/DL
BUN SERPL-MCNC: 7.5 MG/DL (ref 6–20)
CALCIUM SERPL-MCNC: 8 MG/DL (ref 8.8–10.4)
CHLORIDE SERPL-SCNC: 104 MMOL/L (ref 98–107)
CREAT SERPL-MCNC: 0.56 MG/DL (ref 0.51–0.95)
EGFRCR SERPLBLD CKD-EPI 2021: >90 ML/MIN/1.73M2
ERYTHROCYTE [DISTWIDTH] IN BLOOD BY AUTOMATED COUNT: 13.3 % (ref 10–15)
GLUCOSE BLDC GLUCOMTR-MCNC: 118 MG/DL (ref 70–99)
GLUCOSE BLDC GLUCOMTR-MCNC: 130 MG/DL (ref 70–99)
GLUCOSE BLDC GLUCOMTR-MCNC: 131 MG/DL (ref 70–99)
GLUCOSE BLDC GLUCOMTR-MCNC: 79 MG/DL (ref 70–99)
GLUCOSE BLDC GLUCOMTR-MCNC: 88 MG/DL (ref 70–99)
GLUCOSE SERPL-MCNC: 103 MG/DL (ref 70–99)
GLUCOSE SERPL-MCNC: 107 MG/DL (ref 70–99)
HCO3 SERPL-SCNC: 20 MMOL/L (ref 22–29)
HCT VFR BLD AUTO: 34.4 % (ref 35–47)
HGB BLD-MCNC: 11.5 G/DL (ref 11.7–15.7)
HOLD SPECIMEN: NORMAL
LIPASE SERPL-CCNC: 165 U/L (ref 13–60)
MCH RBC QN AUTO: 27.4 PG (ref 26.5–33)
MCHC RBC AUTO-ENTMCNC: 33.4 G/DL (ref 31.5–36.5)
MCV RBC AUTO: 82 FL (ref 78–100)
PLATELET # BLD AUTO: 267 10E3/UL (ref 150–450)
POTASSIUM SERPL-SCNC: 3.7 MMOL/L (ref 3.4–5.3)
PROT SERPL-MCNC: 5.5 G/DL (ref 6.4–8.3)
RBC # BLD AUTO: 4.2 10E6/UL (ref 3.8–5.2)
SODIUM SERPL-SCNC: 137 MMOL/L (ref 135–145)
WBC # BLD AUTO: 13.6 10E3/UL (ref 4–11)

## 2025-06-10 PROCEDURE — 258N000001 HC RX 258: Performed by: SURGERY

## 2025-06-10 PROCEDURE — 250N000009 HC RX 250: Performed by: SURGERY

## 2025-06-10 PROCEDURE — 84155 ASSAY OF PROTEIN SERUM: CPT | Performed by: SURGERY

## 2025-06-10 PROCEDURE — 0FT44ZZ RESECTION OF GALLBLADDER, PERCUTANEOUS ENDOSCOPIC APPROACH: ICD-10-PCS | Performed by: SURGERY

## 2025-06-10 PROCEDURE — 47562 LAPAROSCOPIC CHOLECYSTECTOMY: CPT | Performed by: SURGERY

## 2025-06-10 PROCEDURE — 250N000011 HC RX IP 250 OP 636: Mod: JZ | Performed by: INTERNAL MEDICINE

## 2025-06-10 PROCEDURE — 250N000013 HC RX MED GY IP 250 OP 250 PS 637: Performed by: PHYSICIAN ASSISTANT

## 2025-06-10 PROCEDURE — 999N000040 HC STATISTIC CONSULT NO CHARGE VASC ACCESS

## 2025-06-10 PROCEDURE — 88304 TISSUE EXAM BY PATHOLOGIST: CPT | Mod: TC | Performed by: SURGERY

## 2025-06-10 PROCEDURE — 258N000003 HC RX IP 258 OP 636: Performed by: INTERNAL MEDICINE

## 2025-06-10 PROCEDURE — 258N000003 HC RX IP 258 OP 636

## 2025-06-10 PROCEDURE — 250N000025 HC SEVOFLURANE, PER MIN: Performed by: SURGERY

## 2025-06-10 PROCEDURE — 99222 1ST HOSP IP/OBS MODERATE 55: CPT | Performed by: SPECIALIST

## 2025-06-10 PROCEDURE — 36415 COLL VENOUS BLD VENIPUNCTURE: CPT | Performed by: SURGERY

## 2025-06-10 PROCEDURE — 250N000009 HC RX 250

## 2025-06-10 PROCEDURE — 250N000011 HC RX IP 250 OP 636: Performed by: SPECIALIST

## 2025-06-10 PROCEDURE — 99232 SBSQ HOSP IP/OBS MODERATE 35: CPT | Performed by: INTERNAL MEDICINE

## 2025-06-10 PROCEDURE — 82947 ASSAY GLUCOSE BLOOD QUANT: CPT | Performed by: INTERNAL MEDICINE

## 2025-06-10 PROCEDURE — 250N000011 HC RX IP 250 OP 636: Mod: JZ | Performed by: PHYSICIAN ASSISTANT

## 2025-06-10 PROCEDURE — 999N000141 HC STATISTIC PRE-PROCEDURE NURSING ASSESSMENT: Performed by: SURGERY

## 2025-06-10 PROCEDURE — 88304 TISSUE EXAM BY PATHOLOGIST: CPT | Mod: 26 | Performed by: PATHOLOGY

## 2025-06-10 PROCEDURE — 360N000076 HC SURGERY LEVEL 3, PER MIN: Performed by: SURGERY

## 2025-06-10 PROCEDURE — 120N000001 HC R&B MED SURG/OB

## 2025-06-10 PROCEDURE — 370N000017 HC ANESTHESIA TECHNICAL FEE, PER MIN: Performed by: SURGERY

## 2025-06-10 PROCEDURE — 710N000009 HC RECOVERY PHASE 1, LEVEL 1, PER MIN: Performed by: SURGERY

## 2025-06-10 PROCEDURE — 250N000013 HC RX MED GY IP 250 OP 250 PS 637: Performed by: INTERNAL MEDICINE

## 2025-06-10 PROCEDURE — 82310 ASSAY OF CALCIUM: CPT | Performed by: INTERNAL MEDICINE

## 2025-06-10 PROCEDURE — 250N000011 HC RX IP 250 OP 636: Mod: JZ | Performed by: HOSPITALIST

## 2025-06-10 PROCEDURE — 47562 LAPAROSCOPIC CHOLECYSTECTOMY: CPT | Mod: AS | Performed by: PHYSICIAN ASSISTANT

## 2025-06-10 PROCEDURE — 85027 COMPLETE CBC AUTOMATED: CPT | Performed by: PHYSICIAN ASSISTANT

## 2025-06-10 PROCEDURE — 258N000003 HC RX IP 258 OP 636: Performed by: PHYSICIAN ASSISTANT

## 2025-06-10 PROCEDURE — 250N000011 HC RX IP 250 OP 636: Performed by: SURGERY

## 2025-06-10 PROCEDURE — 250N000011 HC RX IP 250 OP 636

## 2025-06-10 PROCEDURE — 83690 ASSAY OF LIPASE: CPT | Performed by: SURGERY

## 2025-06-10 PROCEDURE — 272N000001 HC OR GENERAL SUPPLY STERILE: Performed by: SURGERY

## 2025-06-10 RX ORDER — HYDROMORPHONE HCL IN WATER/PF 6 MG/30 ML
0.2 PATIENT CONTROLLED ANALGESIA SYRINGE INTRAVENOUS EVERY 5 MIN PRN
Status: DISCONTINUED | OUTPATIENT
Start: 2025-06-10 | End: 2025-06-10 | Stop reason: HOSPADM

## 2025-06-10 RX ORDER — BUPIVACAINE HCL/EPINEPHRINE 0.5-1:200K
VIAL (ML) INJECTION PRN
Status: DISCONTINUED | OUTPATIENT
Start: 2025-06-10 | End: 2025-06-10 | Stop reason: HOSPADM

## 2025-06-10 RX ORDER — LIDOCAINE 4 G/G
1 PATCH TOPICAL EVERY 24 HOURS
Status: DISCONTINUED | OUTPATIENT
Start: 2025-06-10 | End: 2025-06-12 | Stop reason: HOSPADM

## 2025-06-10 RX ORDER — FENTANYL CITRATE 50 UG/ML
INJECTION, SOLUTION INTRAMUSCULAR; INTRAVENOUS PRN
Status: DISCONTINUED | OUTPATIENT
Start: 2025-06-10 | End: 2025-06-10

## 2025-06-10 RX ORDER — DIPHENHYDRAMINE HYDROCHLORIDE 50 MG/ML
25 INJECTION, SOLUTION INTRAMUSCULAR; INTRAVENOUS EVERY 6 HOURS PRN
Status: DISCONTINUED | OUTPATIENT
Start: 2025-06-10 | End: 2025-06-11

## 2025-06-10 RX ORDER — METHOCARBAMOL 500 MG/1
500 TABLET, FILM COATED ORAL 2 TIMES DAILY PRN
Status: DISCONTINUED | OUTPATIENT
Start: 2025-06-10 | End: 2025-06-12 | Stop reason: HOSPADM

## 2025-06-10 RX ORDER — SODIUM CHLORIDE, SODIUM LACTATE, POTASSIUM CHLORIDE, CALCIUM CHLORIDE 600; 310; 30; 20 MG/100ML; MG/100ML; MG/100ML; MG/100ML
INJECTION, SOLUTION INTRAVENOUS CONTINUOUS
Status: DISCONTINUED | OUTPATIENT
Start: 2025-06-10 | End: 2025-06-10 | Stop reason: HOSPADM

## 2025-06-10 RX ORDER — OXYCODONE HYDROCHLORIDE 5 MG/1
5 TABLET ORAL EVERY 4 HOURS PRN
Status: DISCONTINUED | OUTPATIENT
Start: 2025-06-10 | End: 2025-06-12 | Stop reason: HOSPADM

## 2025-06-10 RX ORDER — CEFTRIAXONE 2 G/1
2 INJECTION, POWDER, FOR SOLUTION INTRAMUSCULAR; INTRAVENOUS EVERY 24 HOURS
Status: DISCONTINUED | OUTPATIENT
Start: 2025-06-10 | End: 2025-06-11

## 2025-06-10 RX ORDER — DIPHENHYDRAMINE HCL 25 MG
25 CAPSULE ORAL EVERY 6 HOURS PRN
Status: DISCONTINUED | OUTPATIENT
Start: 2025-06-10 | End: 2025-06-11

## 2025-06-10 RX ORDER — ESTRADIOL 1 MG/1
1 TABLET ORAL DAILY
Status: DISCONTINUED | OUTPATIENT
Start: 2025-06-10 | End: 2025-06-12 | Stop reason: HOSPADM

## 2025-06-10 RX ORDER — CEFAZOLIN SODIUM/WATER 2 G/20 ML
2 SYRINGE (ML) INTRAVENOUS SEE ADMIN INSTRUCTIONS
Status: DISCONTINUED | OUTPATIENT
Start: 2025-06-10 | End: 2025-06-10 | Stop reason: HOSPADM

## 2025-06-10 RX ORDER — ONDANSETRON 2 MG/ML
4 INJECTION INTRAMUSCULAR; INTRAVENOUS EVERY 30 MIN PRN
Status: DISCONTINUED | OUTPATIENT
Start: 2025-06-10 | End: 2025-06-10 | Stop reason: HOSPADM

## 2025-06-10 RX ORDER — SODIUM CHLORIDE, SODIUM LACTATE, POTASSIUM CHLORIDE, CALCIUM CHLORIDE 600; 310; 30; 20 MG/100ML; MG/100ML; MG/100ML; MG/100ML
INJECTION, SOLUTION INTRAVENOUS CONTINUOUS PRN
Status: DISCONTINUED | OUTPATIENT
Start: 2025-06-10 | End: 2025-06-10

## 2025-06-10 RX ORDER — FENTANYL CITRATE 0.05 MG/ML
25 INJECTION, SOLUTION INTRAMUSCULAR; INTRAVENOUS EVERY 5 MIN PRN
Status: DISCONTINUED | OUTPATIENT
Start: 2025-06-10 | End: 2025-06-10 | Stop reason: HOSPADM

## 2025-06-10 RX ORDER — ONDANSETRON 4 MG/1
4 TABLET, ORALLY DISINTEGRATING ORAL EVERY 30 MIN PRN
Status: DISCONTINUED | OUTPATIENT
Start: 2025-06-10 | End: 2025-06-10 | Stop reason: HOSPADM

## 2025-06-10 RX ORDER — ACETAMINOPHEN AND CODEINE PHOSPHATE 300; 30 MG/1; MG/1
1-2 TABLET ORAL EVERY 6 HOURS PRN
Status: DISCONTINUED | OUTPATIENT
Start: 2025-06-10 | End: 2025-06-12 | Stop reason: HOSPADM

## 2025-06-10 RX ORDER — ONDANSETRON 2 MG/ML
INJECTION INTRAMUSCULAR; INTRAVENOUS PRN
Status: DISCONTINUED | OUTPATIENT
Start: 2025-06-10 | End: 2025-06-10

## 2025-06-10 RX ORDER — PROPOFOL 10 MG/ML
INJECTION, EMULSION INTRAVENOUS PRN
Status: DISCONTINUED | OUTPATIENT
Start: 2025-06-10 | End: 2025-06-10

## 2025-06-10 RX ORDER — HYDROMORPHONE HCL IN WATER/PF 6 MG/30 ML
0.4 PATIENT CONTROLLED ANALGESIA SYRINGE INTRAVENOUS EVERY 5 MIN PRN
Status: DISCONTINUED | OUTPATIENT
Start: 2025-06-10 | End: 2025-06-10 | Stop reason: HOSPADM

## 2025-06-10 RX ORDER — CEFAZOLIN SODIUM/WATER 2 G/20 ML
2 SYRINGE (ML) INTRAVENOUS
Status: COMPLETED | OUTPATIENT
Start: 2025-06-10 | End: 2025-06-10

## 2025-06-10 RX ORDER — FENTANYL CITRATE 0.05 MG/ML
50 INJECTION, SOLUTION INTRAMUSCULAR; INTRAVENOUS EVERY 5 MIN PRN
Status: DISCONTINUED | OUTPATIENT
Start: 2025-06-10 | End: 2025-06-10 | Stop reason: HOSPADM

## 2025-06-10 RX ORDER — LIDOCAINE HYDROCHLORIDE 20 MG/ML
INJECTION, SOLUTION INFILTRATION; PERINEURAL PRN
Status: DISCONTINUED | OUTPATIENT
Start: 2025-06-10 | End: 2025-06-10

## 2025-06-10 RX ORDER — NALOXONE HYDROCHLORIDE 0.4 MG/ML
0.1 INJECTION, SOLUTION INTRAMUSCULAR; INTRAVENOUS; SUBCUTANEOUS
Status: DISCONTINUED | OUTPATIENT
Start: 2025-06-10 | End: 2025-06-10 | Stop reason: HOSPADM

## 2025-06-10 RX ORDER — DEXAMETHASONE SODIUM PHOSPHATE 4 MG/ML
4 INJECTION, SOLUTION INTRA-ARTICULAR; INTRALESIONAL; INTRAMUSCULAR; INTRAVENOUS; SOFT TISSUE
Status: DISCONTINUED | OUTPATIENT
Start: 2025-06-10 | End: 2025-06-10 | Stop reason: HOSPADM

## 2025-06-10 RX ORDER — DEXAMETHASONE SODIUM PHOSPHATE 4 MG/ML
INJECTION, SOLUTION INTRA-ARTICULAR; INTRALESIONAL; INTRAMUSCULAR; INTRAVENOUS; SOFT TISSUE PRN
Status: DISCONTINUED | OUTPATIENT
Start: 2025-06-10 | End: 2025-06-10

## 2025-06-10 RX ORDER — MAGNESIUM HYDROXIDE 1200 MG/15ML
LIQUID ORAL PRN
Status: DISCONTINUED | OUTPATIENT
Start: 2025-06-10 | End: 2025-06-10 | Stop reason: HOSPADM

## 2025-06-10 RX ADMIN — HYDROMORPHONE HYDROCHLORIDE 0.5 MG: 1 INJECTION, SOLUTION INTRAMUSCULAR; INTRAVENOUS; SUBCUTANEOUS at 16:30

## 2025-06-10 RX ADMIN — CEFTRIAXONE SODIUM 2 G: 2 INJECTION, POWDER, FOR SOLUTION INTRAMUSCULAR; INTRAVENOUS at 13:11

## 2025-06-10 RX ADMIN — HYDROMORPHONE HYDROCHLORIDE 0.5 MG: 1 INJECTION, SOLUTION INTRAMUSCULAR; INTRAVENOUS; SUBCUTANEOUS at 11:30

## 2025-06-10 RX ADMIN — HYDROXYZINE HYDROCHLORIDE 100 MG: 25 TABLET, FILM COATED ORAL at 23:26

## 2025-06-10 RX ADMIN — HYDROMORPHONE HYDROCHLORIDE 0.5 MG: 1 INJECTION, SOLUTION INTRAMUSCULAR; INTRAVENOUS; SUBCUTANEOUS at 09:53

## 2025-06-10 RX ADMIN — ROCURONIUM BROMIDE 50 MG: 50 INJECTION, SOLUTION INTRAVENOUS at 15:10

## 2025-06-10 RX ADMIN — METRONIDAZOLE 500 MG: 500 INJECTION, SOLUTION INTRAVENOUS at 20:35

## 2025-06-10 RX ADMIN — FENTANYL CITRATE 50 MCG: 50 INJECTION INTRAMUSCULAR; INTRAVENOUS at 15:17

## 2025-06-10 RX ADMIN — DEXMEDETOMIDINE HYDROCHLORIDE 8 MCG: 100 INJECTION, SOLUTION INTRAVENOUS at 15:30

## 2025-06-10 RX ADMIN — ROCURONIUM BROMIDE 20 MG: 50 INJECTION, SOLUTION INTRAVENOUS at 15:41

## 2025-06-10 RX ADMIN — ONDANSETRON 4 MG: 2 INJECTION INTRAMUSCULAR; INTRAVENOUS at 16:20

## 2025-06-10 RX ADMIN — PHENYLEPHRINE HYDROCHLORIDE 100 MCG: 10 INJECTION INTRAVENOUS at 15:45

## 2025-06-10 RX ADMIN — HYDROXYZINE HYDROCHLORIDE 50 MG: 25 TABLET, FILM COATED ORAL at 18:26

## 2025-06-10 RX ADMIN — HYDROXYZINE HYDROCHLORIDE 50 MG: 25 TABLET, FILM COATED ORAL at 03:17

## 2025-06-10 RX ADMIN — HYDROMORPHONE HYDROCHLORIDE 0.5 MG: 1 INJECTION, SOLUTION INTRAMUSCULAR; INTRAVENOUS; SUBCUTANEOUS at 05:45

## 2025-06-10 RX ADMIN — MULTIVITAMIN TABLET 1 TABLET: TABLET at 09:29

## 2025-06-10 RX ADMIN — PROPOFOL 200 MG: 10 INJECTION, EMULSION INTRAVENOUS at 15:10

## 2025-06-10 RX ADMIN — ESTRADIOL 1 MG: 1 TABLET ORAL at 12:12

## 2025-06-10 RX ADMIN — HYDROXYZINE HYDROCHLORIDE 50 MG: 25 TABLET, FILM COATED ORAL at 09:29

## 2025-06-10 RX ADMIN — SODIUM CHLORIDE: 900 INJECTION INTRAVENOUS at 21:46

## 2025-06-10 RX ADMIN — FENTANYL CITRATE 50 MCG: 50 INJECTION INTRAMUSCULAR; INTRAVENOUS at 15:10

## 2025-06-10 RX ADMIN — HYDROMORPHONE HYDROCHLORIDE 0.5 MG: 1 INJECTION, SOLUTION INTRAMUSCULAR; INTRAVENOUS; SUBCUTANEOUS at 18:25

## 2025-06-10 RX ADMIN — LIDOCAINE HYDROCHLORIDE 60 MG: 20 INJECTION, SOLUTION INFILTRATION; PERINEURAL at 15:10

## 2025-06-10 RX ADMIN — DEXMEDETOMIDINE HYDROCHLORIDE 12 MCG: 100 INJECTION, SOLUTION INTRAVENOUS at 15:32

## 2025-06-10 RX ADMIN — HYDROMORPHONE HYDROCHLORIDE 0.5 MG: 1 INJECTION, SOLUTION INTRAMUSCULAR; INTRAVENOUS; SUBCUTANEOUS at 15:51

## 2025-06-10 RX ADMIN — SODIUM CHLORIDE, SODIUM LACTATE, POTASSIUM CHLORIDE, AND CALCIUM CHLORIDE: .6; .31; .03; .02 INJECTION, SOLUTION INTRAVENOUS at 15:04

## 2025-06-10 RX ADMIN — DEXAMETHASONE SODIUM PHOSPHATE 4 MG: 4 INJECTION, SOLUTION INTRA-ARTICULAR; INTRALESIONAL; INTRAMUSCULAR; INTRAVENOUS; SOFT TISSUE at 15:10

## 2025-06-10 RX ADMIN — DIPHENHYDRAMINE HYDROCHLORIDE 25 MG: 25 CAPSULE ORAL at 12:12

## 2025-06-10 RX ADMIN — SODIUM CHLORIDE: 900 INJECTION INTRAVENOUS at 11:41

## 2025-06-10 RX ADMIN — QUETIAPINE FUMARATE 50 MG: 25 TABLET ORAL at 23:26

## 2025-06-10 RX ADMIN — METHOCARBAMOL 750 MG: 750 TABLET ORAL at 20:55

## 2025-06-10 RX ADMIN — CEFEPIME 2 G: 2 INJECTION, POWDER, FOR SOLUTION INTRAVENOUS at 05:47

## 2025-06-10 RX ADMIN — METRONIDAZOLE 500 MG: 500 INJECTION, SOLUTION INTRAVENOUS at 09:46

## 2025-06-10 RX ADMIN — Medication 2 G: at 15:10

## 2025-06-10 RX ADMIN — ACETAMINOPHEN 650 MG: 325 TABLET, FILM COATED ORAL at 18:26

## 2025-06-10 RX ADMIN — ACETAMINOPHEN AND CODEINE PHOSPHATE 1 TABLET: 300; 30 TABLET ORAL at 12:28

## 2025-06-10 RX ADMIN — METHOCARBAMOL 500 MG: 500 TABLET ORAL at 23:26

## 2025-06-10 RX ADMIN — MIDAZOLAM 2 MG: 1 INJECTION INTRAMUSCULAR; INTRAVENOUS at 15:04

## 2025-06-10 RX ADMIN — Medication 200 MG: at 16:20

## 2025-06-10 RX ADMIN — DIPHENHYDRAMINE HYDROCHLORIDE 25 MG: 25 CAPSULE ORAL at 18:25

## 2025-06-10 RX ADMIN — OXYCODONE HYDROCHLORIDE 5 MG: 5 TABLET ORAL at 20:55

## 2025-06-10 ASSESSMENT — ACTIVITIES OF DAILY LIVING (ADL)
ADLS_ACUITY_SCORE: 59
ADLS_ACUITY_SCORE: 58
ADLS_ACUITY_SCORE: 58
ADLS_ACUITY_SCORE: 59
ADLS_ACUITY_SCORE: 58
ADLS_ACUITY_SCORE: 59
ADLS_ACUITY_SCORE: 58
ADLS_ACUITY_SCORE: 59
ADLS_ACUITY_SCORE: 58
ADLS_ACUITY_SCORE: 59
ADLS_ACUITY_SCORE: 59
ADLS_ACUITY_SCORE: 58
ADLS_ACUITY_SCORE: 59
ADLS_ACUITY_SCORE: 58
ADLS_ACUITY_SCORE: 59
ADLS_ACUITY_SCORE: 59
ADLS_ACUITY_SCORE: 58
ADLS_ACUITY_SCORE: 59

## 2025-06-10 NOTE — PLAN OF CARE
[id number]    Orientations: A&Ox4   Vitals/Pain: VSS, RA, LS clear and equal. Pain 9/10 (migraine)   Tele: SR (75)  Lines/Drains: PIV R infusing   Skin/Wounds: WDL x Abdom and scattered bruising   GI/: Purewick per pt request, No BM  Labs: Abnormal/Trends: ALT (120), AST (95), BG (107, 79), Lipase (165), WBC (13.6), Hgb (11.5), hematocrit (34.4)  Electrolyte Replacement- N/A   Ambulation/Assist: Ax 1 g/w (refused AOOB)   Sleep Quality: Napped on and off today  Plan: Pt very frustrated with being here and staff. All PTA meds ordered for pt comfort, pt having persistent migraines through day. ID following and switched IV abx. Went down for cholecystectomy at 1315 and  meeting at OR with bladder stimulator remote to turn off stimulator. Pt took iPhone and charged to pre-op. Report given to gen surg CEE [place] @ [id number].

## 2025-06-10 NOTE — PLAN OF CARE
"Goal Outcome Evaluation:  1900-0730       Alert and oriented. AVSS on room air except for pain. Pt mainly complaining of headache rated as 10/10. Pain managed with PRN Dilaudid, Robaxin, Tylenol. Tele is SR. TROY clear.  Pt for scheduled Laparoscopic Cholecystectomy today. Pt on NPO starting after midnight.  Pt was also given full bath with CHG wipes. Pt refused CHG shower. Bed sheets and linens and hospital gown were all changed. During bed bath, pt got frustrated with nurse and nursing assistant that she shouted to them she is not getting the proper care she deserved. 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 Room temp was at 75 F during that time; lower extremities were covered with blanket while her upper part of the body was wiped.  Up with A1; pt not out of bed this shift. Pt able to reposition herself while in bed.  1 PIV infusing with NS X 100mL/hr.  For Laparoscopic Cholecystectomy today.                   "

## 2025-06-10 NOTE — ANESTHESIA CARE TRANSFER NOTE
Patient: Natty Bateman    Procedure: Procedure(s):  CHOLECYSTECTOMY, LAPAROSCOPIC       Diagnosis: Choledocholithiasis [K80.50]  Diagnosis Additional Information: No value filed.    Anesthesia Type:   General     Note:    Oropharynx: oropharynx clear of all foreign objects and spontaneously breathing  Level of Consciousness: awake  Oxygen Supplementation: face mask  Level of Supplemental Oxygen (L/min / FiO2): 6  Independent Airway: airway patency satisfactory and stable  Dentition: dentition unchanged  Vital Signs Stable: post-procedure vital signs reviewed and stable  Report to RN Given: handoff report given  Patient transferred to: PACU    Handoff Report: Identifed the Patient, Identified the Reponsible Provider, Reviewed the pertinent medical history, Discussed the surgical course, Reviewed Intra-OP anesthesia mangement and issues during anesthesia, Set expectations for post-procedure period and Allowed opportunity for questions and acknowledgement of understanding      Vitals:  Vitals Value Taken Time   /92 06/10/25 16:36   Temp 36.3  C (97.34  F) 06/10/25 16:38   Pulse 94 06/10/25 16:38   Resp 16 06/10/25 16:38   SpO2 92 % 06/10/25 16:38   Vitals shown include unfiled device data.    Electronically Signed By: CHAU Blevins CRNA  Divya 10, 2025  4:39 PM

## 2025-06-10 NOTE — ANESTHESIA POSTPROCEDURE EVALUATION
Patient: Natty Bateman    Procedure: Procedure(s):  CHOLECYSTECTOMY, LAPAROSCOPIC       Anesthesia Type:  General    Note:  Disposition: Outpatient   Postop Pain Control: Uneventful            Sign Out: Well controlled pain   PONV: No   Neuro/Psych: Uneventful            Sign Out: Acceptable/Baseline neuro status   Airway/Respiratory: Uneventful            Sign Out: Acceptable/Baseline resp. status   CV/Hemodynamics: Uneventful            Sign Out: Acceptable CV status   Other NRE: NONE   DID A NON-ROUTINE EVENT OCCUR? No           Last vitals:  Vitals Value Taken Time   /96 06/10/25 17:15   Temp 36.3  C (97.4  F) 06/10/25 17:15   Pulse 101 06/10/25 17:23   Resp 35 06/10/25 17:23   SpO2 96 % 06/10/25 17:23   Vitals shown include unfiled device data.    Electronically Signed By: Guru Elizabeth MD  Divya 10, 2025  5:24 PM

## 2025-06-10 NOTE — PROGRESS NOTES
[facility]    Medicine Progress Note - Hospitalist Service    Date of Admission:  6/8/2025  Interval History   Patient is very frustrated this morning.  Felt like people are rude.  Wanted to make sure that she was getting her surgery done today or was thinking about going to [facility].  Feels like her medications are not being given like she requests.  Asked why she could not have a PICC line/midline or other line placed because she is a difficult draw and it takes them several times to get her blood.   Based on surgical note would appear she is supposed to have surgery today reviewed.   Updated that her blood cultures were positive. Wonders why some of her medications have not been given that she brought in from home.  Her Qulipta is non formulary and she brought this from home.   She wonders why she cannot take her own Atarax that she brought from home because she does not want to be charged for receiving Atarax in the hospital from pharmacy.  Wants to take her own medication  Reviewed the care plan today in terms of probable surgery.  Also went over med list in detail with the patient and nurse in the room.     Assessment & Plan   Natty Bateman is a 54 year old female  type 2 diabetes mellitus, bipolar disorder, hypertension, hyperlipidemia, IBS, urinary retention s/p bladder stimulator, chronic pain, chronic and intractable migraine, GERD, and insomnia presented [place] ED with worsening lower chest /upper abdominal discomfort.      Patient presented to [place] ER with complaints of chest discomfort across the bra line that slowly began around 10 AM and worsened through the day.  Had gotten up to go to work and felt worse. ?  Slight shortness of breath.  Felt nauseous.  History of recurrent bladder infections and kidney stones.  No fever or chills.  Vitals showed a blood pressure 146/81 with a pulse of 131 temp of 90  Lactate returned at 4.1 with a repeat of 5.1.  Urine  negative.  BMP sodium 130 potassium 3.6 carbon dioxide 25 with a creatinine of 0.76: Elevated liver test with a bilirubin of 2.3, , , alk phos 101 and lipase normal 172: Troponin normal white count 11.3 hemoglobin 12 D-dimer elevated at 1.94  CT abdomen pelvis showing a dilated gallbladder with new intra and extrahepatic biliary ductal dilatation with common bile duct 12 mm, 6.7 mm high density filling defect in the CBD compatible with choledocholithiasis.  Pancreas with no ductal dilatation: No other acute findings.  A CT angio of her chest was also done given elevated D-dimer showing no PE.  Ultimately she did develop a fever in the ER: She was started on cefepime and Flagyl.  Transferred to [facility] given sepsis with concerns of ascending cholangitis and need for ERCP    Sepsis  Ascending cholangitis with sepsis  Klebsiella pneumonia (bacteremia)   Patient with presentation to outside hospital and clinical presentation of sepsis with lactic acidosis 5.1, leukocytosis, elevated liver tests as above, tachycardic to 131 >> started on treatment for sepsis.  CT abdomen pelvis showing new intra and extrahepatic biliary ductal dilation with CBD 12 and 6.7 mm density filling defect in the CBD  6/9 Patient with RUQ tenderness on examination   GI as well as seen the patient was plans for ERCP  Continue cefepime and Flagyl.  Additional fluids given sepsis.  Received 1 L prior to arrival >> given 900 cc overnight>> give 1 L saline bolus and increase IV fluids to 125  6/9 lactate of 1.5 on repeat this am.   6/9 ERCP with choledocholithiasis.  Major papilla appeared congested.  Complete removal with biliary sphincterotomy and balloon extraction.  Biliary sphincterotomy was performed.  Biliary tree was swept and pus and sludge were found.  One temporary stent was placed into the common bile duct.  6/9 continue on cefepime and Flagyl  GI okay to advance diet as tolerated.  ERCP 3 months to remove stent  (Follow-up  office 2 weeks)  6/10 cultures from outside hospital with bacteremia with klebsiella pneumonia. (1 of 2) spoke with lab and ESBL negative.   6/10 based on surgical notes plans for surgery ?? Today. Defer to surgery   Patient requested IV PICC/midline for her blood draws and IV Abx.   Requested input from ID as well on the duration of antibiotic therapy and tapering.  Source control with ERCP, stent  Surgical intervention to remove gallbladder    Sepsis   Elevated ddimer outside hospital  6/8 CT angio chest PE study no acute intrathoracic findings.  No PE.  No pleural effusions or pneumothorax.    Hypochloremic hyponatremia  Na 130, Cl 93 at 5pm 6/8 at [place] ED  IVF continued  Monitor BMP  6/10 BMP added on this am. >> Currently on NS at 100.   Fluid for sepsis 6/9 bolus given and increase IVF>> then to po diet by later in day      Migraine  Seen by neurology as an outpatient with intractable chronic migraines.  As outlined in neurology notes she has had multiple prior meds for her migraines.  Had Botox done 4/8/2025   She reports taking her Relpax before she came to [facility] at the ER  Given as needed dose of Imitrex here   Still reporting her headaches are bad.  Would not continue to use triptans back-to-back will get a neurology consult  Noted patient has polypharmacy of migraine medications. (Ubrelvy, Imitrex injection, Imitrex tablet, Qulipta)   6/9 Neurology consult  6/10 neurology recommended patient continue on medications as per migraines from outpatient.  Atogepant 60 mg p.o. daily (resumed and patient bringing in home med is not on formulary)   Takes Ubrelvy for as needed at onset of headache ordered (patient brought in home supply)       IBS  Prior to admission on multiple medications including Bentyl 10 mg 3 times daily, Linzess every morning, Phenergan for nausea, Mounjaro for weight loss)  Currently awaiting return of her GI function will hold on multiple bowel regimen  6/10 reviewed meds this  morning in detail with the patient>> she is aware that the Linzess has not been started, Phenergan she is okay not to resume as she has Zofran.  Mounjaro held given bowel concerns and she is in agreement.  Bentyl not started as well and she is okay understanding her bowel function is attempting to resume prior function and will be having surgery.   6/10 reviewed all meds today from home as outlined in agreement with plan to hold these currently  Readdress when these could be started potentially prior to discharge or on discharge       T2DM  A1c 6.1% recently, follows with Endo at [facility]  PTA mounjaro noted - previously on ozempic  hold PTA mounjaro:   Explained Glucophage on hold with lactic acidosis  Sliding scale insulin      Hypertension  Hyperlipidemia  vitals to be updated  PTA clonidine to resume as long as BP acceptable if/when verified  PTA statin held for now  6/9 blood pressure borderline lower with sepsis.  She is on clonidine 0.2 twice daily (and not getting reflex hypertension suggestive of lower than normal blood pressure)   6/10 will review blood pressures post procedure > running mildly higher this a.m. prior to surgery  Resume clonidine as able>> hypotensive 6/9 with sepsis     Bipolar disorder  Stable upon admission  Resume prior to admission meds not able to take p.o. after procedure  Seroquel 50 mg at bedtime  Vilazodone 40 mg po q am   6/10 patient was prescribed Atarax as prior to admission.>>  Reviewed today that she was upset she can take her home supply did not want to be charged for administration of Atarax     GERD  PPI continue      Chronic pain syndrome  - PTA regimen to continue as appropriate with acute pain regimen as above  Dilaudid given in the hospital but patient prefers to restart her Tylenol with codeine which was ordered.  Again explained that when she was n.p.o. and had her ERCP could not give these orally    IV access:  Patient upset that she is a difficult blood  "draw.  Asked the same where she can have a midline or PICC  Currently bacteremic  Will have ID address ABX regimen and ? PICC/midline with bacteremia but if leaving in next 2 days. ? If will be needed. Daily blood draws should be adequate now that sepsis treated.     Disposition: Anticipate patient will be going home following her surgery recovery and sepsis recovery  6/10 spent time going over patient's concerns.  Reviewed her med list in great detail and went over each of her home medications addressing her questions.  Anticipate patient will be able to be discharged after antibiotics can be confirmed for her sepsis treatment.  Susceptibilities pending.           Diet: NPO for Procedure/Surgery per Anesthesia Guidelines Except for: Meds; Clear liquids before procedure/surgery: ADULT (Age GREATER than or Equal to 18 years) - Clear liquids 2 hours before procedure/surgery    DVT Prophylaxis: Pneumatic Compression Devices: No anticoagulation with recent ERCP Patient going to surgery so did not discuss DVT prevention prior to surgery. Need to confirm with GI and surgery when/of ok to start DVT prevention. Had sphincterotomy and STENT so may need to wait   Dent Catheter: Not present  Lines: None     Cardiac Monitoring: None  Code Status: No CPR- Do NOT Intubate      Clinically Significant Risk Factors         # Hyponatremia: Lowest Na = 133 mmol/L in last 2 days, will monitor as appropriate       # Hypoalbuminemia: Lowest albumin = 3.1 g/dL at 6/10/2025  6:39 AM, will monitor as appropriate                # Overweight: Estimated body mass index is 29.23 kg/m  as calculated from the following:    Height as of this encounter: 1.6 m (5' 3\").    Weight as of this encounter: 74.8 kg (165 lb)., PRESENT ON ADMISSION            Social Drivers of Health    Depression: Not at risk (4/28/2025)    Received from [facility]    PHQ-2     PHQ-2 Total: 2   Recent Concern: Depression - At risk " (1/28/2025)    Received from [facility]    PHQ-2     PHQ-2 Total: 3   Housing Stability: Unknown (1/28/2025)    Received from [facility]    Housing Stability Vital Sign     Unable to Pay for Housing in the Last Year: No     Homeless in the Last Year: No   Physical Activity: Inactive (10/10/2023)    Received from [facility]    Exercise Vital Sign     Days of Exercise per Week: 0 days     Minutes of Exercise per Session: 0 min   Stress: Stress Concern Present (10/10/2023)    Received from [facility]    [employer] - Occupational Stress Questionnaire     Feeling of Stress : Very much          Disposition Plan     Medically Ready for Discharge: Anticipated in 2-4 Days         GABRIELA LONGORIA MD  Hospitalist Service  [facility]  Securely message with scanR (more info)  Text page via [employer] Paging/Directory   ______________________________________________________________________    Physical Exam   Vital Signs: Temp: 98.8  F (37.1  C) Temp src: Oral BP: (!) 153/80 Pulse: 73   Resp: 18 SpO2: 97 % O2 Device: None (Room air) Oxygen Delivery: 4 LPM  Weight: 165 lbs 0 oz    General Appearance: Patient lying in the bed.   Respiratory: Decreased breath sounds but otherwise clear  Cardiovascular: Regular rate and rhythm without gallop rub normal S1-S2  GI: Positive bowel sounds soft with mild RUQ tenderness.   Skin: No edema      Medical Decision Making       50 MINUTES SPENT BY ME on the date of service doing chart review, history, exam, documentation & further activities per the note.    Extensive review of her meds, PTA, imaging, Called [facility] for culture results.   Labs, meds, plan discussed with nurse and patient at bedside    Data     I have personally reviewed the following data over the past 24 hrs:    13.6 (H)  \   11.5 (L)    / 267     133 (L) 102 11.2 /  107 (H)   3.7 20 (L) 0.60 \     ALT: 120 (H) AST: 95 (H) AP: 91 TBILI: 0.7   ALB: 3.1 (L) TOT PROTEIN: 5.5 (L) LIPASE: 165 (H)     TSH: N/A T4: N/A A1C: N/A       Imaging results reviewed over the past 24 hrs:   Recent Results (from the past 24 hours)   XR Surgery KESHA L/T 5 Min Fluoro w Stills    Narrative    This exam was marked as non-reportable because it will not be read by a   radiologist or a [facility] non-radiologist provider.

## 2025-06-10 NOTE — CONSULTS
[facility]    Infectious Disease Consultation     Date of Admission:  6/8/2025  Date of Consult (When I saw the patient): 06/10/25    Assessment:  54 YF with diabetes, bipolar disorder, urinary retention s/p bladder stimulator among multiple other chronic conditions, who has been hospitalized with abdominal and chest pain, along with fever, leukocytosis and marked elevation of liver enzymes, and has been found to have cholangitis related to cholecystitis and choledocholithiasis.    -Ascending cholangitis with sepsis  -Liver enzyme elevation  -Choledocholithiasis  -Cholecystitis  -Chronic medical conditions - diabetes, hypertension, hyperlipidemia, bipolar disorder, GERD, chronic pain disorder, migraine headaches    Recommendations:  S/p ERCP  Undergoing cholecystectomy today  Discontinue Cefepime  Treat with Ceftriaxone and metronidazole  Would suggest continuing antibiotics for 5 days post op and could transition to oral therapy (Augmentin) by tomorrow      Yazmin Delacruz MD    Reason for Consult   Reason for consult: I was asked to evaluate this patient for ascending cholangitis.    Primary Care Physician   Bakari Castellanos    Chief Complaint   Abdominal pain, chest pain and headache    History is obtained from the patient and medical records    History of Present Illness   Natty Bateman is a 54 year old female with diabetes, bipolar disorder, urinary retention s/p bladder stimulator among multiple other chronic conditions, who has been hospitalized with abdominal and chest pain, along with fever, leukocytosis and marked elevation of liver enzymes, and has been found to have cholangitis related to cholecystitis and choledocholithiasis.    Patient had a low grade fever on admission with WBC of 17K , ,  and normal alkaline phosphatase.   CT abdomen showed  Choledocholithiasis with new significant intra and extrahepatic biliary   ductal dilatation.     She underwent ERCP on   with removal of choledocholithiasis, biliary sphincterotomy and a stent was placed    Patient is planned for cholecystectomy. She has been maintained on cefepime and metronidazole and ID has been asked to assist with further management. Patient also complains of  a headache, and abdominal pain is improved now.      Antimicrobial therapy   Cefepime, metronidazole  Past Medical History   I have reviewed this patient's medical history and updated it with pertinent information if needed.   Past Medical History:   Diagnosis Date    Abnormal glucose     ddd     cervical    Depression     Insomnia     Leiomyoma of uterus, unspecified     Migraine headaches     Obesity 2009    Overweight(278.02)        Past Surgical History   I have reviewed this patient's surgical history and updated it with pertinent information if needed.  Past Surgical History:   Procedure Laterality Date    ENDOSCOPIC RETROGRADE CHOLANGIOPANCREATOGRAM COMPLEX N/A 2025    Procedure: ENDOSCOPIC RETROGRADE CHOLANGIOPANCREATOGRAPHY, COMPLEX;  Surgeon: Alberto Forman MD;  Location: [facility]    HC REPAIR ROTATOR CUFF,ACUTE  2-    TONSILLECTOMY  age 33    TUBAL LIGATION  age 24    ZZC TOTAL ABDOM HYSTERECTOMY  -    has ovaries, myomatous uterus       Prior to Admission Medications   Prior to Admission Medications   Prescriptions Last Dose Informant Patient Reported? Taking?   ACE/ARB NOT PRESCRIBED, INTENTIONAL,  Self Yes No   Sig: by Other route continuous prn.   Atogepant (QULIPTA) 60 MG tablet 2025 Morning Self Yes Yes   Sig: Take 60 mg by mouth daily.   Blood Glucose Monitoring Suppl (ONE TOUCH ULTRA 2) W/DEVICE KIT  Self No No   Si Device daily.   DIABETIC STERILE LANCETS device  Self No No   Si Device daily.   Lidocaine (LIDOCARE) 4 % Patch 2025 Evening  Yes Yes   Sig: Place 1 patch onto the skin as needed for moderate pain. To prevent lidocaine toxicity, patient should be patch free for 12 hrs daily.    MAGNESIUM GLYCINATE PO 6/7/2025 Self Yes Yes   Sig: Take by mouth every evening. Pt unsure of dose.   Magnesium Citrate 100 MG TABS 6/7/2025 Morning Self Yes Yes   Sig: Take by mouth every morning. Pt unsure of dose.   QUEtiapine (SEROQUEL) 50 MG tablet 6/7/2025 Bedtime Self Yes Yes   Sig: Take 50 mg by mouth at bedtime.   SUMAtriptan (IMITREX) 100 MG tablet 6/6/2025 Self Yes Yes   Sig: Take 100 mg by mouth at onset of headache for migraine. May repeat after 2 hours if necessary, not to exceed 200mg in 24hrs   SUMAtriptan (IMITREX) 6 MG/0.5ML injection 6/5/2025 Self Yes Yes   Sig: Inject 6 mg subcutaneously at onset of headache for migraine. May repeat 1 hour after the first dose. Do not exceed 2 doses per 24 hours*   Vitamin D3 (VITAMIN D, CHOLECALCIFEROL,) 25 mcg (1000 units) tablet 6/7/2025 Self Yes Yes   Sig: Take 25 mcg by mouth daily.   acetaminophen-codeine (TYLENOL #3) 300-30 MG per tablet 6/7/2025 Self No Yes   Sig: Take 1-2 tablets by mouth every 6 hours as needed for pain.   Patient taking differently: Take 1 tablet by mouth every 6 hours as needed for pain.   aluminum chloride (DRYSOL) 20 % external solution More than a month Self Yes Yes   Sig: Apply topically at bedtime.   aspirin (ASA) 81 MG chewable tablet 6/7/2025 Self Yes Yes   Sig: Take 81 mg by mouth daily.   calcium polycarbophil (FIBERCON) 625 MG tablet 6/7/2025 Evening Self Yes Yes   Sig: Take 1 tablet by mouth every evening.   cloNIDine (CATAPRES) 0.1 MG tablet 6/7/2025 Evening Self Yes Yes   Sig: Take 0.2 mg by mouth 2 times daily.   diclofenac (VOLTAREN) 75 MG EC tablet 6/7/2025 Self Yes Yes   Sig: Take 75 mg by mouth 2 times daily as needed for moderate pain.   dicyclomine (BENTYL) 10 MG capsule 6/8/2025 Morning Self Yes Yes   Sig: Take 10 mg by mouth 3 times daily (before meals). TAKE 1 CAPSULE BY MOUTH THREE TIMES A DAY BEFORE MEALS. TAKE AS NEEDED FOR ABDOMINAL PAIN/CRAMPING   estradiol (ESTRACE) 1 MG tablet 6/8/2025 Self Yes Yes    Sig: Take 1 mg by mouth daily.   hydrOXYzine HCl (ATARAX) 50 MG tablet 6/8/2025 Morning Self Yes Yes   Sig: Take by mouth See Admin Instructions. Take 1 tablet by mouth twice a day and 2 tablets at bedtime.   linaclotide (LINZESS) 72 MCG capsule 6/8/2025 Morning Self Yes Yes   Sig: Take 72 mcg by mouth every morning (before breakfast).   metFORMIN (GLUCOPHAGE-XR) 750 MG 24 hr tablet 6/7/2025 Morning Self Yes Yes   Sig: Take 750 mg by mouth daily (with breakfast).   methocarbamol (ROBAXIN) 750 MG tablet 6/7/2025 Evening Self Yes Yes   Sig: Take 750 mg by mouth nightly as needed for muscle spasms.   minoxidil (LONITEN) 2.5 MG tablet 6/8/2025 Morning Self Yes Yes   Sig: Take 1.25 mg by mouth daily.   multivitamin, therapeutic (THERA-VIT) TABS tablet 6/7/2025 Morning Self Yes Yes   Sig: Take 1 tablet by mouth daily.   omeprazole (PRILOSEC) 40 MG DR capsule 6/7/2025 Evening Self Yes Yes   Sig: Take 40 mg by mouth 2 times daily.   promethazine (PHENERGAN) 12.5 MG tablet 6/7/2025 Evening Self Yes Yes   Sig: Take 12.5 mg by mouth 2 times daily as needed for nausea.   rosuvastatin (CRESTOR) 5 MG tablet 6/7/2025 Bedtime Self Yes Yes   Sig: Take 5 mg by mouth at bedtime.   sennosides (SENOKOT) 8.6 MG tablet 6/7/2025 Bedtime Self Yes Yes   Sig: Take 2 tablets by mouth at bedtime.   tirzepatide (MOUNJARO) 10 MG/0.5ML SOAJ auto-injector pen 6/4/2025 Morning Self Yes Yes   Sig: Inject 10 mg subcutaneously once a week.   ubrogepant (UBRELVY) 100 MG tablet 6/7/2025 Self Yes Yes   Sig: Take 100 mg by mouth at onset of headache (for migraine). May repeat after 2 hrs as needed. Max of 200 mg in 24 hours.   vilazodone (VIIBRYD) 40 MG TABS tablet 6/8/2025 Morning Self Yes Yes   Sig: Take 40 mg by mouth every morning.      Facility-Administered Medications: None     Allergies   Allergies   Allergen Reactions    Adhesive Tape Dermatitis    Depakene [Valproic Acid] Other (See Comments)    Fish Oil Rash    Geodon [Ziprasidone] Other (See  Comments)    Tegaderm Transparent Dressing (Informational Only) Dermatitis    Vortioxetine Itching       Immunization History   Immunization History   Administered Date(s) Administered    COVID-19 MONOVALENT 12+ (Pfizer) 03/25/2021, 04/19/2021, 10/19/2021    COVID-19 Monovalent 12+ (Pfizer 2022) 08/10/2022    Influenza (IIV3) PF 11/01/2007, 11/01/2008    TD,PF 7+ (Tenivac) 11/01/2006       Social History   I have reviewed this patient's social history and updated it with pertinent information if needed. Natty Bateman  reports that she has never smoked. She does not have any smokeless tobacco history on file. She reports current alcohol use.    Family History   I have reviewed this patient's family history and updated it with pertinent information if needed.   Family History   Adopted: Yes   Problem Relation Age of Onset    Breast Cancer Maternal Aunt     Hypertension No family hx of     Diabetes No family hx of        Review of Systems   The 10 point Review of Systems is as per HPI    Physical Exam   Temp: 98  F (36.7  C) Temp src: Oral BP: (!) 146/73 Pulse: 60   Resp: 18 SpO2: 94 % O2 Device: None (Room air) Oxygen Delivery: 4 LPM  Vital Signs with Ranges  Temp:  [96.5  F (35.8  C)-99.1  F (37.3  C)] 98  F (36.7  C)  Pulse:  [60-87] 60  Resp:  [16-23] 18  BP: ()/() 146/73  SpO2:  [94 %-100 %] 94 %  165 lbs 0 oz  Body mass index is 29.23 kg/m .    GENERAL APPEARANCE:  awake  EYES: Eyes grossly normal to inspection  NECK: no adenopathy  RESP: lungs clear   CV: regular rates and rhythm  LYMPHATICS: normal ant/post cervical and supraclavicular nodes  ABDOMEN: epigastric and RUQ pain  MS: extremities normal  SKIN: no suspicious lesions or rashes        Data   All laboratory data reviewed     Component      Latest Ref Rng 6/10/2025  6:39 AM   Sodium      135 - 145 mmol/L 137    Potassium      3.4 - 5.3 mmol/L 3.7    Chloride      98 - 107 mmol/L 104    Carbon Dioxide (CO2)      22 - 29 mmol/L 20 (L)     Anion Gap      7 - 15 mmol/L 13    Urea Nitrogen      6.0 - 20.0 mg/dL 7.5    Creatinine      0.51 - 0.95 mg/dL 0.56    GFR Estimate      >60 mL/min/1.73m2 >90    Calcium      8.8 - 10.4 mg/dL 8.0 (L)    Glucose      70 - 99 mg/dL 103 (H)    WBC      4.0 - 11.0 10e3/uL 13.6 (H)    RBC Count      3.80 - 5.20 10e6/uL 4.20    Hemoglobin      11.7 - 15.7 g/dL 11.5 (L)    Hematocrit      35.0 - 47.0 % 34.4 (L)    MCV      78 - 100 fL 82    MCH      26.5 - 33.0 pg 27.4    MCHC      31.5 - 36.5 g/dL 33.4    RDW      10.0 - 15.0 % 13.3    Platelet Count      150 - 450 10e3/uL 267    Protein Total      6.4 - 8.3 g/dL 5.5 (L)    Albumin      3.5 - 5.2 g/dL 3.1 (L)    Bilirubin Total      <=1.2 mg/dL 0.7    Alkaline Phosphatase      40 - 150 U/L 91    AST      0 - 45 U/L 95 (H)    ALT      0 - 50 U/L 120 (H)    Bilirubin Direct      0.00 - 0.30 mg/dL 0.42 (H)       Imaging  EXAM: CT ABDOMEN PELVIS W IV CONTRAST   LOCATION: [facility]   DATE: 6/8/2025     INDICATION: Sepsis   COMPARISON: 2/5/24   TECHNIQUE: CT scan of the abdomen and pelvis was performed following injection   of IV contrast. Multiplanar reformats were obtained. Dose reduction techniques   were used.   CONTRAST: omni 350 100ml     FINDINGS:   LOWER CHEST: No basilar infiltrates     HEPATOBILIARY: Dilated gallbladder. New intra and extra hepatic biliary ductal   dilatation with the common duct reaching 12 mm. 6.7 mm high density filling   defect in the common bile duct compatible with choledocholithiasis (series 4   image 48).     PANCREAS: No ductal dilatation or peripancreatic fluid collections     SPLEEN: Unremarkable     ADRENAL GLANDS: Unremarkable     KIDNEYS/BLADDER: No hydronephrosis or suspicious renal mass lesions. Normal   bladder contour.     BOWEL: Normal caliber appendix.     LYMPH NODES: No significant retroperitoneal adenopathy     VASCULATURE: No abdominal aortic aneurysm     PELVIC ORGANS: Hysterectomy. No free fluid      MUSCULOSKELETAL: Sacral stimulator. No acute bony abnormalities.     IMPRESSION:   1.  Choledocholithiasis with new significant intra and extrahepatic biliary   ductal dilatation.

## 2025-06-10 NOTE — PROGRESS NOTES
Pt was refusing her blood draw earlier. Pt got very frustrated to the Phlebotomist that she asked him to get out of her room. This was heard when this writer was in another room just in front of pt's room. She was also shouting to the Phlebotomist that she was disrespected.    Writer talked to pt and asked her of what happened. Pt said she asked for a hot pack to place on top of her hands so her veins would warm up prior to blood draw which the Phlebotomist provided on both hands. Pt then asked for him to come back after 15 min which he also did. But pt was telling this writer that her veins were not warm enough yet and it was not ready to draw blood. Phlebotomist was explaining that if they can just talk to the nurse and help with the situation. But pt was already shouting to the Phlebotomist and asking him to get out of the room. She was not even letting the Phlebotomist finish his sentence.    Writer was able to talk to pt if she is okay if there is another lab person coming to do the lab draw. Also explained to her that the lab test that needs to be done are important for her upcoming operation. She is okay with that.

## 2025-06-10 NOTE — DISCHARGE INSTRUCTIONS
[facility]  Discharge Instructions: Post-Operative Laparoscopic Cholecystectomy    ACTIVITY  Expect to feel tired after your surgery.  This will gradually resolve.    Take frequent, short walks and increase your activity gradually.    Avoid strenuous physical activity or heavy lifting greater than 15-20 lbs. for 2-3 weeks.  You may climb stairs.  You may drive without restrictions when you are not using any prescription pain medication and feel comfortable in a car.  You may return to work/school when you are comfortable without any prescription pain medication.    WOUND CARE  You may remove your outer dressing or Band-Aids and shower 48 hours after the surgery.  Pat your incisions dry and leave them open to air.  Re-apply dressing (Band-Aids or gauze/tape) as needed for comfort or drainage.  You may have steri-strips (looks like white tape) on your incision.  You may peel off the steri-strips 2 weeks after your surgery if they have not peeled off on their own.   Do not soak your incisions in a tub or pool for 2 weeks.   Do not apply any lotions, creams, or ointments to your incisions.  A ridge under your incisions is normal and will gradually resolve.    DIET  Start with liquids, then gradually resume your regular diet as tolerated.  Avoid heavy, spicy, and greasy meals for 2-3 days.  Drink plenty of fluids to stay hydrated.  It is not uncommon to experience some loose stools or diarrhea after surgery.  This is your body's way of adapting to the bile which will slowly drain into your intestine.  A low fat diet may help with this.  This should improve over 1-2 months.    PAIN  Expect some tenderness and discomfort at the incision sites.  Use the prescribed pain medication at your discretion.  Expect gradual resolution of your pain over several days.  You may take ibuprofen with food (unless you have been told not to) or acetaminophen/Tylenol instead of or in addition to your prescribed  pain medication.  If you are taking Norco or Percocet, do not take any additional acetaminophen/Tylenol.  Do not drink alcohol or drive while you are taking pain medications.  You may apply ice to your incisions in 20 minute intervals as needed for the next 48 hours.  After that time, consider switching to heat if you prefer.    EXPECTATIONS  Pain medications can cause constipation.  Limit use when possible.  Take over the counter stool softener/stimulant, such as Colace or Senna, 1-2 times a day with plenty of water.  You may take a mild over the counter laxative, such as Miralax or a suppository, as needed.  You may discontinue these medications once you are having regular bowel movements and/or are no longer taking your narcotic pain medication.    You may have shoulder or upper back discomfort due to the gas used in surgery.  This is temporary and should resolve in 48-72 hours.  Short, frequent walks may help with this.  If you are unable to urinate, or feel as though you are not emptying your bladder adequately, we recommend you call our office and/or seek care at an ER or Urgent Care facility if after hours.    FOLLOW UP  Our office will contact you in approximately 2 weeks to check on your progress and answer any questions you may have.  If you are doing well, you will not need to return for a follow up appointment.  If any concerns are identified over the phone, we will help you make an appointment to see a provider.   If you have not received a phone call, have any questions or concerns, or would like to be seen, please call us at [phone] and ask to speak with our nurse.  We are located at [address] [place] [address].    CALL OUR OFFICE -[phone] IF YOU HAVE:   Chills or fever above 101 F.  Increased redness, warmth, or drainage at your incisions.  Significant bleeding.  Pain not relieved by your pain medication or rest.  Increasing pain after the first 48 hours.  Any  other concerns or questions.

## 2025-06-10 NOTE — BRIEF OP NOTE
[facility]    Brief Operative Note    Pre-operative diagnosis: Choledocholithiasis [K80.50] Post-operative diagnosis Same as pre-operative diagnosis     Procedure: CHOLECYSTECTOMY, LAPAROSCOPIC, N/A - Abdomen    Surgeon: Surgeons and Role:     * Rissa Boggs MD - Primary     * Kofi Brown PA-C - Assisting  Anesthesia: General   Estimated Blood Loss: 15ml    Drains: None  Specimens:   ID Type Source Tests Collected by Time Destination   1 : GALLBLADDER AND CONTENTS Tissue Gallbladder SURGICAL PATHOLOGY EXAM Rissa Boggs MD 6/10/2025  4:05 PM      Findings:   Gallbladder with fatty tissue adhesions removed and hemostasis achieved with electrocautery.    Complications: None.  Implants: * No implants in log *    FARHAT Kwok PA-C  Office: [phone]  Pager: [phone]

## 2025-06-10 NOTE — OR NURSING
Spoke to [facility] [place] regarding pt hypertensive in PACU 170/2/92. No intervention at this time per [facility]. Pt approved to be transfer to the floor.

## 2025-06-10 NOTE — PROGRESS NOTES
VAT received a consult that patient needs an IV rotation. Patient has an infiltrate in right hand. Patient requests IV at a specific spot on there arm which is hard and not compressible. Patient has a functioning IV the issue is the position of IV. Patient declines to keep arm positioned. Discussed with RN caring for patient.

## 2025-06-10 NOTE — PROVIDER NOTIFICATION
06/09/25 2245   Critical Test Results/Notification   Critical Lab Result (Lab Name and Value) blood culture from [place]  (Klebsiella Pneumoniae detected)   What Time Did The Lab Notify You? 2245   Provider Notified yes   Date of Provider Notification 06/09/25   Time of Provider Notification 2245   Mechanism of Provider notification page  (vocera)   What Provider Did You Notify? Dr. Francy Segundo   Response aware          Remote transmission received for BIV pacemaker with good battery life  Normal thresholds, impedence and sensing   99.5%  Events: atrial fibrillation. On OAC warfarin.  OptiVol: stable    Asymptomatic atrial fibrillation. Short duration have been noted on device interrogations. Patient asymptomatic.

## 2025-06-10 NOTE — ANESTHESIA PROCEDURE NOTES
Airway       Patient location during procedure: OR       Procedure Start/Stop Times: 6/10/2025 3:12 PM  Staff -        Anesthesiologist:  Dayne Canales MD       CRNA: Yadira Guillory APRN CRNA       Performed By: CRNA  Consent for Airway        Urgency: elective  Indications and Patient Condition       Indications for airway management: susan-procedural       Induction type:intravenous       Mask difficulty assessment: 2 - vent by mask + OA or adjuvant +/- NMBA    Final Airway Details       Final airway type: endotracheal airway       Successful airway: ETT - single  Endotracheal Airway Details        ETT size (mm): 7.0       Cuffed: yes       Successful intubation technique: video laryngoscopy       VL Blade Size: Glidescope 3       Grade View of Cords: 1       Adjucts: stylet       Position: Center       Measured from: lips       Secured at (cm): 21       Bite block used: None    Post intubation assessment        Placement verified by: capnometry, equal breath sounds and chest rise        Number of attempts at approach: 1       Number of other approaches attempted: 0       Secured with: tape       Ease of procedure: easy       Dentition: Intact and Unchanged    Medication(s) Administered   Medication Administration Time: 6/10/2025 3:12 PM

## 2025-06-10 NOTE — PROGRESS NOTES
[facility]  Gastroenterology Progress Note     Natty Bateman MRN# [id number]   YOB: 1970 Age: 54 year old          Assessment and Plan:     Natty Bateman is a 54 year old female with extensive history including but not limited to type 2 diabetes mellitus, bipolar disorder, hypertension, hyperlipidemia, IBS, urinary retention s/p bladder stimulator, chronic pain, migraine, GERD, and insomnia among others who presented to [place] [address] ED with worsening lower chest / upper abdominal discomfort, chills, nausea. Noted to have significantly elevated LFTs, CT abdo/pelvis showed choledocholithiasis w/ intra & extrahepatic biliary ductal dilation.      Cholangitis (H)  Choledocholithiasis  Symptoms of chills, upper abdominal discomfort and nausea   CT abdo/pelvis showed choledocholithiasis w/ intra & extrahepatic biliary ductal dilation.   LFTs remain elevated and greatly improved  Leukocytosis 11.3k>17k>13.6. Blood culture positive for Klebsiella  Findings consistent with choledocholithiasis with cholangitis  6/9/25 ERCP found choledocholithiasis, complete removal accomplished by biliary sphincterotomy and balloon extraction. Biliary sphincterotomy was performed. Pus and sludge and found. One temporary sten placed in CBD     - NPO for cholecystectomy today  - Daily LFTs, CBC  - IVF  - antiemetic, analgesic prn  - continue cefepime and metro  - Repeat ERCP with stent removal in 3 months           Interval History:     denies chest pain, denies shortness of breath, alert, oriented to person, place and time, and c/o ongoing abdominal pain, migraines              Review of Systems:     C: NEGATIVE for fever, chills, change in weight  E/M: NEGATIVE for ear, mouth and throat problems  R: NEGATIVE for significant cough or SOB  CV: NEGATIVE for chest pain, palpitations or peripheral edema             Medications:   I have reviewed this patient's current medications  Current  Facility-Administered Medications   Medication Dose Route Frequency Provider Last Rate Last Admin    Atogepant (QULIPTA) tablet 60 mg  60 mg Oral Daily Francy Segundo MD   60 mg at 06/09/25 2336    ceFEPIme (MAXIPIME) 2 g vial to attach to  mL bag for ADULTS or NS 50 mL bag for PEDS  2 g Intravenous Q12H Ruba Huerta MD   2 g at 06/10/25 0547    [Held by provider] cloNIDine (CATAPRES) tablet 0.2 mg  0.2 mg Oral BID Ruba Huerta MD        estradiol (ESTRACE) tablet 1 mg  1 mg Oral Daily Ruba Huerta MD        hydrOXYzine HCl (ATARAX) tablet 100 mg  100 mg Oral At Bedtime Ruba Huerta MD   100 mg at 06/09/25 2228    hydrOXYzine HCl (ATARAX) tablet 50 mg  50 mg Oral BID Ruba Huerta MD   50 mg at 06/10/25 0317    insulin aspart (NovoLOG) injection (RAPID ACTING)  1-7 Units Subcutaneous Q4H Ruba Huerta MD   1 Units at 06/09/25 2251    Lidocaine (LIDOCARE) 4 % Patch 1 patch  1 patch Transdermal Q24H Ruba Huerta MD        [Held by provider] linaclotide (LINZESS) capsule 72 mcg  72 mcg Oral QAM AC Ruba Huerta MD        metroNIDAZOLE (FLAGYL) infusion 500 mg  500 mg Intravenous Q12H Don Sidhu MD   500 mg at 06/09/25 1956    multivitamin, therapeutic (THERA-VIT) tablet 1 tablet  1 tablet Oral Daily Ruba Huerta MD        pantoprazole (PROTONIX) EC tablet 40 mg  40 mg Oral BID AC Eric Song MD        QUEtiapine (SEROquel) tablet 50 mg  50 mg Oral At Bedtime Ruba Huerta MD   50 mg at 06/09/25 2229    [Held by provider] rosuvastatin (CRESTOR) tablet 5 mg  5 mg Oral At Bedtime Ruba Huerta MD        sodium chloride (PF) 0.9% PF flush 3 mL  3 mL Intracatheter Q8H Don Thompson MD   3 mL at 06/10/25 0547    sodium chloride (PF) 0.9% PF flush 3 mL  3 mL Intracatheter Q8H Don Thompson MD   3 mL at 06/10/25 0545    sodium chloride 0.9% BOLUS 500 mL  500 mL Intravenous Once Ruba Huerta MD        vilazosylviae  (VIIBRYD) tablet 40 mg  40 mg Oral Ruba Guzman MD                      Physical Exam:   Vitals were reviewed  Vital Signs with Ranges  Temp:  [96.5  F (35.8  C)-100.1  F (37.8  C)] 98.8  F (37.1  C)  Pulse:  [68-97] 73  Resp:  [16-23] 18  BP: ()/() 153/80  SpO2:  [95 %-100 %] 97 %  I/O last 3 completed shifts:  In: 2459.58 [P.O.:60; I.V.:1297.58; IV Piggyback:1102]  Out: 1600 [Urine:1600]  Constitutional: Alert, oriented to person, place, date, situation.  Cooperative, lying in bed in NAD.   Respiratory:  Lungs CTAB.  No crackles, wheezes, or rhonchi, no labored breathing.  Cardiovascular:  Heart RRR, no MRG, no edema.  GI:  Abdomen soft, tender RUQ and epigastric area ND and with normoactive BS  Skin/Integumen:  Warm, dry, non-diaphoretic.  MSK: CMS x4 intact.             Data:   I reviewed the patient's new clinical lab test results.   Recent Labs   Lab Test 06/10/25  0639 06/09/25  0704 02/01/25  1607   WBC 13.6* 17.0* 6.9   HGB 11.5* 11.8 12.5   MCV 82 83 83    261 280     Recent Labs   Lab Test 06/09/25  1639 06/09/25  0704 02/01/25  1607   POTASSIUM 3.7 4.1 3.4   CHLORIDE 102 102 99   CO2 20* 18* 26   BUN 11.2 11.7 10.0   ANIONGAP 11 16* 12     Recent Labs   Lab Test 06/10/25  0639 06/09/25  0704 02/01/25  1607   ALBUMIN 3.1* 3.1* 3.9   BILITOTAL 0.7 1.9* 0.4   * 160* 101*   AST 95* 171* 292*   LIPASE 165*  --  42       I reviewed the patient's new imaging results.    All laboratory data reviewed  All imaging studies reviewed by me.    Bushra Velasquez PA-C,  6/10/2025  [facility]  Office : [phone]  Cell: [phone] (Dr. Forman)  Cell: [phone] (Bushra Vleasquez PA-C)

## 2025-06-10 NOTE — OP NOTE
Note time: 1630    Operative Note:     PREOPERATIVE DIAGNOSIS:  choledocholithiasis     POSTOPERATIVE DIAGNOSIS:  Minimally inflamed gallbladder     PROCEDURE PERFORMED: Laparoscopic cholecystectomy     SURGEON: Rissa Boggs MD  ASSISTANT: Kofi Brown PA-C was asked to assist for camera operation, exposure, hemostasis, counter-traction, closure.       ANESTHESIA: General endotracheal.     ESTIMATED BLOOD LOSS: 25 mL     FINDINGS:  The gallbladder was minimally inflamed;  There was granular substance palpable through the gallbladder, ex vivo.     COMPLICATIONS: None.     SPECIMENS: Gallbladder.     OPERATIVE INDICATIONS: Natty Bateman is a 54 year old female with a history of ERCP yesterday for choledocholithiasis.     After understanding the risks and benefits of proceeding with surgery, the patient has an  indication for laparoscopic cholecystectomy and consented to undergo surgery.     I reviewed the risks of surgery with the patient.     These include, but are not limited to, death, myocardial infarction, pneumonia, urinary tract  infection, deep venous thrombosis with or without pulmonary embolus, abdominal infection from  bowel injury or abscess, bowel obstruction, wound infection, and bleeding.  More specific risks related to laparoscopic cholecystectomy include bile duct injury (3/1000), bile leak (10/1000), retained common bile duct stone (10/1000), postcholecystectomy diarrhea (1-2%) and these complications may require additional treatment.     OPERATIVE DETAILS: The patient was brought to the operating room and prepared in a  routine fashion. A timeout was performed prior to surgery and documented by the nursing  team.  Under the benefits of general anesthesia, a left upper quadrant Veress needle was inserted and  pneumoperitoneum was established using carbon dioxide gas to a maximum pressure of 15  mmHg. A total of 4 ports were placed and the laparoscope was utilized from the  periumbilical  port site.     The patient was moved into a steep reverse Trendelenberg position and left lateral decubitus  tilt.     The gallbladder was grasped at its fundus and retracted cephalad. It was also grasped at its  infundibulum and retracted laterally.     A complete dissection starting on the gallbladder, identifying the cystic artery on the surface of  the gallbladder, was performed. The cystic artery in this manner was medialized and   away from the gallbladder and the infundibulum of the gallbladder and the junction of  gallbladder and cystic duct was clearly and completely identified. All of this dissection was  performed on the gallbladder wall and clearly above the triangle of Calot.  Next, a dissection laterally on the gallbladder was achieved and the peritoneum was divided  along the lateral aspect of the gallbladder.     The dissection then continued medially. The cystic artery was  from the infundibulum  of the gallbladder and the cystic duct. Next, a complete dissection of the upper aspect of the  triangle of Calot was performed and the critical view of safety was achieved.     The cystic artery and cystic duct were clipped securely and divided between clips. The  gallbladder was then removed out of its fossa using electrocautery. It was placed into an  Endocatch bag and removed from the abdomen.     Next, the gallbladder fossa was irrigated with saline and tthe saline was aspirated. The aspirate  was clear.      Complete hemostasis was achieved.     The umbilical incision was closed using a figure of eight with 0 Vicryl suture.     The skin was closed using 4-0 monocryl suture.     I was present for all central components of the operation and all needle and sponge counts were correct x2 at the end of the procedure.

## 2025-06-11 LAB
ALBUMIN SERPL BCG-MCNC: 3.2 G/DL (ref 3.5–5.2)
ALP SERPL-CCNC: 111 U/L (ref 40–150)
ALT SERPL W P-5'-P-CCNC: 77 U/L (ref 0–50)
ANION GAP SERPL CALCULATED.3IONS-SCNC: 14 MMOL/L (ref 7–15)
AST SERPL W P-5'-P-CCNC: 35 U/L (ref 0–45)
BILIRUB SERPL-MCNC: 0.5 MG/DL
BUN SERPL-MCNC: 4.1 MG/DL (ref 6–20)
CALCIUM SERPL-MCNC: 8.1 MG/DL (ref 8.8–10.4)
CHLORIDE SERPL-SCNC: 101 MMOL/L (ref 98–107)
CREAT SERPL-MCNC: 0.54 MG/DL (ref 0.51–0.95)
EGFRCR SERPLBLD CKD-EPI 2021: >90 ML/MIN/1.73M2
ERYTHROCYTE [DISTWIDTH] IN BLOOD BY AUTOMATED COUNT: 13.4 % (ref 10–15)
GLUCOSE BLDC GLUCOMTR-MCNC: 109 MG/DL (ref 70–99)
GLUCOSE BLDC GLUCOMTR-MCNC: 118 MG/DL (ref 70–99)
GLUCOSE BLDC GLUCOMTR-MCNC: 129 MG/DL (ref 70–99)
GLUCOSE BLDC GLUCOMTR-MCNC: 135 MG/DL (ref 70–99)
GLUCOSE SERPL-MCNC: 165 MG/DL (ref 70–99)
HCO3 SERPL-SCNC: 22 MMOL/L (ref 22–29)
HCT VFR BLD AUTO: 34.1 % (ref 35–47)
HGB BLD-MCNC: 11.4 G/DL (ref 11.7–15.7)
MCH RBC QN AUTO: 27 PG (ref 26.5–33)
MCHC RBC AUTO-ENTMCNC: 33.4 G/DL (ref 31.5–36.5)
MCV RBC AUTO: 81 FL (ref 78–100)
PLATELET # BLD AUTO: 297 10E3/UL (ref 150–450)
POTASSIUM SERPL-SCNC: 3.3 MMOL/L (ref 3.4–5.3)
PROT SERPL-MCNC: 5.7 G/DL (ref 6.4–8.3)
RBC # BLD AUTO: 4.22 10E6/UL (ref 3.8–5.2)
SODIUM SERPL-SCNC: 137 MMOL/L (ref 135–145)
WBC # BLD AUTO: 9 10E3/UL (ref 4–11)

## 2025-06-11 PROCEDURE — 250N000013 HC RX MED GY IP 250 OP 250 PS 637: Performed by: PHYSICIAN ASSISTANT

## 2025-06-11 PROCEDURE — 250N000011 HC RX IP 250 OP 636: Mod: JZ | Performed by: PHYSICIAN ASSISTANT

## 2025-06-11 PROCEDURE — 250N000013 HC RX MED GY IP 250 OP 250 PS 637: Performed by: HOSPITALIST

## 2025-06-11 PROCEDURE — 250N000011 HC RX IP 250 OP 636: Mod: JZ | Performed by: HOSPITALIST

## 2025-06-11 PROCEDURE — 85027 COMPLETE CBC AUTOMATED: CPT | Performed by: PHYSICIAN ASSISTANT

## 2025-06-11 PROCEDURE — 250N000011 HC RX IP 250 OP 636: Performed by: INTERNAL MEDICINE

## 2025-06-11 PROCEDURE — 99232 SBSQ HOSP IP/OBS MODERATE 35: CPT | Performed by: SPECIALIST

## 2025-06-11 PROCEDURE — 250N000011 HC RX IP 250 OP 636: Performed by: PHYSICIAN ASSISTANT

## 2025-06-11 PROCEDURE — 36415 COLL VENOUS BLD VENIPUNCTURE: CPT | Performed by: PHYSICIAN ASSISTANT

## 2025-06-11 PROCEDURE — 99233 SBSQ HOSP IP/OBS HIGH 50: CPT | Performed by: HOSPITALIST

## 2025-06-11 PROCEDURE — 82040 ASSAY OF SERUM ALBUMIN: CPT | Performed by: PHYSICIAN ASSISTANT

## 2025-06-11 PROCEDURE — 120N000001 HC R&B MED SURG/OB

## 2025-06-11 PROCEDURE — 250N000013 HC RX MED GY IP 250 OP 250 PS 637: Performed by: SPECIALIST

## 2025-06-11 RX ORDER — LABETALOL HYDROCHLORIDE 5 MG/ML
10 INJECTION, SOLUTION INTRAVENOUS
Status: COMPLETED | OUTPATIENT
Start: 2025-06-11 | End: 2025-06-11

## 2025-06-11 RX ORDER — SODIUM CHLORIDE 9 MG/ML
INJECTION, SOLUTION INTRAVENOUS CONTINUOUS
Status: CANCELLED | OUTPATIENT
Start: 2025-06-11

## 2025-06-11 RX ORDER — KETOROLAC TROMETHAMINE 15 MG/ML
15 INJECTION, SOLUTION INTRAMUSCULAR; INTRAVENOUS EVERY 6 HOURS PRN
Status: COMPLETED | OUTPATIENT
Start: 2025-06-11 | End: 2025-06-11

## 2025-06-11 RX ORDER — CLONIDINE HYDROCHLORIDE 0.1 MG/1
0.1 TABLET ORAL 2 TIMES DAILY
Status: DISCONTINUED | OUTPATIENT
Start: 2025-06-11 | End: 2025-06-11

## 2025-06-11 RX ORDER — POTASSIUM CHLORIDE 1500 MG/1
40 TABLET, EXTENDED RELEASE ORAL ONCE
Status: COMPLETED | OUTPATIENT
Start: 2025-06-11 | End: 2025-06-11

## 2025-06-11 RX ORDER — ACETAMINOPHEN AND CODEINE PHOSPHATE 300; 30 MG/1; MG/1
1-2 TABLET ORAL EVERY 6 HOURS PRN
Qty: 20 TABLET | Refills: 0 | Status: SHIPPED | OUTPATIENT
Start: 2025-06-11

## 2025-06-11 RX ORDER — HYDRALAZINE HYDROCHLORIDE 20 MG/ML
10 INJECTION INTRAMUSCULAR; INTRAVENOUS EVERY 6 HOURS PRN
Status: DISCONTINUED | OUTPATIENT
Start: 2025-06-11 | End: 2025-06-12 | Stop reason: HOSPADM

## 2025-06-11 RX ORDER — DIPHENHYDRAMINE HCL 25 MG
50 CAPSULE ORAL EVERY 6 HOURS PRN
Status: DISCONTINUED | OUTPATIENT
Start: 2025-06-11 | End: 2025-06-12 | Stop reason: HOSPADM

## 2025-06-11 RX ORDER — DIPHENHYDRAMINE HYDROCHLORIDE 50 MG/ML
50 INJECTION, SOLUTION INTRAMUSCULAR; INTRAVENOUS EVERY 6 HOURS PRN
Status: DISCONTINUED | OUTPATIENT
Start: 2025-06-11 | End: 2025-06-12 | Stop reason: HOSPADM

## 2025-06-11 RX ADMIN — ACETAMINOPHEN AND CODEINE PHOSPHATE 2 TABLET: 300; 30 TABLET ORAL at 09:20

## 2025-06-11 RX ADMIN — CEFTRIAXONE SODIUM 2 G: 2 INJECTION, POWDER, FOR SOLUTION INTRAMUSCULAR; INTRAVENOUS at 11:53

## 2025-06-11 RX ADMIN — HYDROXYZINE HYDROCHLORIDE 50 MG: 25 TABLET, FILM COATED ORAL at 09:23

## 2025-06-11 RX ADMIN — DIPHENHYDRAMINE HYDROCHLORIDE 50 MG: 25 CAPSULE ORAL at 22:10

## 2025-06-11 RX ADMIN — HYDROXYZINE HYDROCHLORIDE 50 MG: 25 TABLET, FILM COATED ORAL at 16:46

## 2025-06-11 RX ADMIN — ESTRADIOL 1 MG: 1 TABLET ORAL at 09:22

## 2025-06-11 RX ADMIN — DIPHENHYDRAMINE HYDROCHLORIDE 50 MG: 25 CAPSULE ORAL at 15:40

## 2025-06-11 RX ADMIN — METHOCARBAMOL 500 MG: 500 TABLET ORAL at 10:29

## 2025-06-11 RX ADMIN — ACETAMINOPHEN AND CODEINE PHOSPHATE 2 TABLET: 300; 30 TABLET ORAL at 15:40

## 2025-06-11 RX ADMIN — OXYCODONE HYDROCHLORIDE 5 MG: 5 TABLET ORAL at 03:17

## 2025-06-11 RX ADMIN — AMOXICILLIN AND CLAVULANATE POTASSIUM 1 TABLET: 875; 125 TABLET, FILM COATED ORAL at 21:55

## 2025-06-11 RX ADMIN — MULTIVITAMIN TABLET 1 TABLET: TABLET at 10:30

## 2025-06-11 RX ADMIN — KETOROLAC TROMETHAMINE 15 MG: 15 INJECTION, SOLUTION INTRAMUSCULAR; INTRAVENOUS at 22:10

## 2025-06-11 RX ADMIN — HYDROXYZINE HYDROCHLORIDE 100 MG: 25 TABLET, FILM COATED ORAL at 23:24

## 2025-06-11 RX ADMIN — CLONIDINE HYDROCHLORIDE 0.2 MG: 0.1 TABLET ORAL at 21:55

## 2025-06-11 RX ADMIN — KETOROLAC TROMETHAMINE 15 MG: 15 INJECTION, SOLUTION INTRAMUSCULAR; INTRAVENOUS at 15:40

## 2025-06-11 RX ADMIN — CLONIDINE HYDROCHLORIDE 0.1 MG: 0.1 TABLET ORAL at 09:28

## 2025-06-11 RX ADMIN — LABETALOL HYDROCHLORIDE 10 MG: 5 INJECTION INTRAVENOUS at 04:05

## 2025-06-11 RX ADMIN — METHOCARBAMOL 750 MG: 750 TABLET ORAL at 23:24

## 2025-06-11 RX ADMIN — POTASSIUM CHLORIDE 40 MEQ: 1500 TABLET, EXTENDED RELEASE ORAL at 16:46

## 2025-06-11 RX ADMIN — VILAZODONE HYDROCHLORIDE 40 MG: 20 TABLET ORAL at 10:30

## 2025-06-11 RX ADMIN — METRONIDAZOLE 500 MG: 500 INJECTION, SOLUTION INTRAVENOUS at 09:17

## 2025-06-11 RX ADMIN — HYDRALAZINE HYDROCHLORIDE 10 MG: 20 INJECTION INTRAMUSCULAR; INTRAVENOUS at 09:15

## 2025-06-11 RX ADMIN — KETOROLAC TROMETHAMINE 15 MG: 15 INJECTION, SOLUTION INTRAMUSCULAR; INTRAVENOUS at 09:30

## 2025-06-11 RX ADMIN — DIPHENHYDRAMINE HYDROCHLORIDE 50 MG: 25 CAPSULE ORAL at 09:21

## 2025-06-11 RX ADMIN — QUETIAPINE FUMARATE 50 MG: 25 TABLET ORAL at 23:24

## 2025-06-11 ASSESSMENT — ACTIVITIES OF DAILY LIVING (ADL)
ADLS_ACUITY_SCORE: 61
ADLS_ACUITY_SCORE: 62
ADLS_ACUITY_SCORE: 61
ADLS_ACUITY_SCORE: 61
ADLS_ACUITY_SCORE: 62
ADLS_ACUITY_SCORE: 61
ADLS_ACUITY_SCORE: 58
ADLS_ACUITY_SCORE: 61
ADLS_ACUITY_SCORE: 62
ADLS_ACUITY_SCORE: 61

## 2025-06-11 NOTE — PLAN OF CARE
"Date & Time: 6/11 0700-1930  Surgery/POD#: 1 lap nicholas  Behavior & Aggression: Green  Fall Risk: Yes  Orientation: A&Ox4  ABNL VS/O2: VSS ex BP max 181/101  ABNL Labs: , 118, K 3.3 - replaced  Pain Management: tylenol with codeine x2, Toradol x2, & robaxin x1  Bowel/Bladder: Voiding adequately in BR  Drains: PIVSL  Wounds/incisions: abdominal lap sites CDI with steri strips  Diet: tolerating low fiber  Number of times OUT OF BED this shift: ambulated in [place] x1 independently, encouraged to ambulate all day - \"I will, I promise\"  Tests/Procedures: NA  Anticipated  DC Date: Tomorrow  Significant Information: itchy - PRN benadryl given x2. Refused AM BG checks d/t not wanting to be poked      "

## 2025-06-11 NOTE — PROVIDER NOTIFICATION
MD Notification    Notified Person: MD    Notified Person Name: Rogelio Kiana    Notification Date/Time: 06/11/2025, 0333    Notification Interaction: Vocera web    Purpose of Notification: SBP above 180     Orders Received: x1 dose labetalol     Comments:

## 2025-06-11 NOTE — PROVIDER NOTIFICATION
"MD Notification    Notified Person: MD    Notified Person Name: Neisha    Notification Date/Time: 6/11 07:52    Notification Interaction: vocera message    Purpose of Notification: \"Patient BP has been running high. 181/101 this AM. Voiding adequately and tolerating PO fluids, OK to DC fluids? Please advise\"\"I saw the PA, she is stopping IVF. Perhaps we can add a scheduled BP med, though since her BP has been consistently high.\"    Orders Received: PRN hydralazine    Comments:    "

## 2025-06-11 NOTE — PROGRESS NOTES
[facility]    Infectious Disease Progress Note    Date of Service (when I saw the patient): 06/11/2025     Assessment:  54 YF with diabetes, bipolar disorder, urinary retention s/p bladder stimulator among multiple other chronic conditions, who has been hospitalized with abdominal and chest pain, along with fever, leukocytosis and marked elevation of liver enzymes, and has been found to have cholangitis related to cholecystitis and choledocholithiasis.     -Ascending cholangitis with sepsis  -Liver enzyme elevation  -Choledocholithiasis  -Cholecystitis  -Chronic medical conditions - diabetes, hypertension, hyperlipidemia, bipolar disorder, GERD, chronic pain disorder, migraine headaches     Recommendations:  S/p ERCP and Cholecystectomy  Clinically improved  Discontinue IV Ceftriaxone and Metronidazole and transition to oral Augmentin for 5 days then discontinue antibiotics  ID will sign off, please call us back as needed      Yazmin Delacruz MD    Interval History   Underwent cholecystectomy yesterday  Better today, has remained afebrile and leukocytosis has resolved .Ambulated and is eating, asking about discharge      Physical Exam   Temp: 98.6  F (37  C) Temp src: Oral BP: (!) 178/92 Pulse: 82   Resp: 18 SpO2: 96 % O2 Device: None (Room air) Oxygen Delivery: 2 LPM  Vitals:    06/09/25 0000   Weight: 74.8 kg (165 lb)     Vital Signs with Ranges  Temp:  [97.3  F (36.3  C)-99  F (37.2  C)] 98.6  F (37  C)  Pulse:  [] 82  Resp:  [15-34] 18  BP: (164-192)/() 178/92  SpO2:  [94 %-100 %] 96 %    Constitutional: Awake, alert, cooperative, no apparent distress  Lungs: Clear   Abdomen: Normal bowel sounds, soft, non-distended, non-tender  Skin: No rashes, no cyanosis, no edema  Other:    Medications   Current Facility-Administered Medications   Medication Dose Route Frequency Provider Last Rate Last Admin    [Held by provider] sodium chloride 0.9 % infusion   Intravenous Continuous Kevin  Kofi KANG PA-C   Stopped at 06/11/25 0730     Current Facility-Administered Medications   Medication Dose Route Frequency Provider Last Rate Last Admin    Atogepant (QULIPTA) tablet 60 mg  60 mg Oral Daily Kofi Brown PA-C   60 mg at 06/11/25 0922    cefTRIAXone (ROCEPHIN) 2 g vial to attach to  ml bag for ADULTS or NS 50 ml bag for PEDS  2 g Intravenous Q24H Kofi Brown PA-C   2 g at 06/10/25 1311    cloNIDine (CATAPRES) tablet 0.1 mg  0.1 mg Oral BID Waleska Driver MD   0.1 mg at 06/11/25 0928    [Held by provider] cloNIDine (CATAPRES) tablet 0.2 mg  0.2 mg Oral BID Kofi Brown PA-C        estradiol (ESTRACE) tablet 1 mg  1 mg Oral Daily Kofi Brown PA-C   1 mg at 06/11/25 0922    hydrOXYzine HCl (ATARAX) tablet 100 mg  100 mg Oral At Bedtime Kofi Brown PA-C   100 mg at 06/10/25 2326    hydrOXYzine HCl (ATARAX) tablet 50 mg  50 mg Oral BID Kofi Brown PA-C   50 mg at 06/11/25 0923    insulin aspart (NovoLOG) injection (RAPID ACTING)  1-7 Units Subcutaneous Q4H Kofi Brown PA-C   1 Units at 06/09/25 2251    Lidocaine (LIDOCARE) 4 % Patch 1 patch  1 patch Transdermal Q24H Kofi Brown PA-C        [Held by provider] linaclotide (LINZESS) capsule 72 mcg  72 mcg Oral QAM  Kofi Brown PA-C        metroNIDAZOLE (FLAGYL) infusion 500 mg  500 mg Intravenous Q12H Kofi Brown PA-C   500 mg at 06/11/25 0917    multivitamin, therapeutic (THERA-VIT) tablet 1 tablet  1 tablet Oral Daily Kofi Brown PA-C   1 tablet at 06/11/25 1030    pantoprazole (PROTONIX) EC tablet 40 mg  40 mg Oral BID AC Kofi Brown PA-C        QUEtiapine (SEROquel) tablet 50 mg  50 mg Oral At Bedtime Kofi Brown PA-C   50 mg at 06/10/25 2326    [Held by provider] rosuvastatin (CRESTOR) tablet 5 mg  5 mg Oral At Bedtime Kofi Brown PA-C        sodium chloride (PF) 0.9% PF flush 3 mL  3 mL Intracatheter Q8H [facility] Kofi Brown PA-C   3 mL at 06/10/25 1312    sodium chloride  (PF) 0.9% PF flush 3 mL  3 mL Intracatheter Q8H [facility] Kofi Brown PA-C   3 mL at 06/10/25 1312    sodium chloride 0.9% BOLUS 500 mL  500 mL Intravenous Once Kofi Brown PA-C        vilazodone (VIIBRYD) tablet 40 mg  40 mg Oral QAM Kofi Brown PA-C   40 mg at 06/11/25 1030       Data   All microbiology laboratory data reviewed.  Recent Labs   Lab Test 06/11/25  1115 06/10/25  0639 06/09/25  0704   WBC 9.0 13.6* 17.0*   HGB 11.4* 11.5* 11.8   HCT 34.1* 34.4* 35.4   MCV 81 82 83    267 261     Recent Labs   Lab Test 06/11/25  1115 06/10/25  0639 06/09/25  1639   CR 0.54 0.56 0.60
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
## 2025-06-11 NOTE — PLAN OF CARE
Date & Time: 6/10 6594-3491  Surgery/POD#: 0 lap nicohlas  Behavior & Aggression: Green  Fall Risk: Yes  Orientation: A&Ox4  ABNL VS/O2: VSS ex HTN & tachypnea  ABNL Labs:   Pain Management:0.5 IV dilaudid x1  Bowel/Bladder: Voiding adequately via purewic  Drains: PIV x2 - 1 very positional & one painful - IV team paged  Wounds/incisions: abdominal lap sites CDI with bandaids  Diet:Clear liquids  Number of times OUT OF BED this shift None  Tests/Procedures: NA  Anticipated  DC Date: Pending  Significant Information: itchy - PRN benadryl given. Refusing capno

## 2025-06-11 NOTE — PLAN OF CARE
Goal Outcome Evaluation:       Date & Time: 06/11/2025  Surgery/POD#:     POD #2 ERCP w/ stent placement   POD #1 Lap. Yara.     Behavior & Aggression: Pt can be very anxious at times, for the most part is calm and cooperative   Fall Risk: Yes  Orientation:AOX4  ABNL VS/O2: VSS, RA   ABNL Labs: LFTs elevated, WBC elevated   Pain Management: Scheduled atarax, PRN tylenol, robaxin, and oxy for abd and migraine pain   Bowel/Bladder: No BM, not passing gas, Female external catheter in place per pt request. Pt educated on the use of the female external catheter.   Drains: FEC   Wounds/incisions: x4 lap sites  Diet:Full liquid   Number of times OUT OF BED this shift: Pt stood x1 at the edge of th bed, Asst. 1 gait belt and walker.   Tests/Procedures: NA   Anticipated  DC Date: TBD   Significant Information:

## 2025-06-11 NOTE — PROVIDER NOTIFICATION
MD Notification    Notified Person: MD    Notified Person Name: Emily Saunders    Notification Date/Time: 06/11/2025, 2011    Notification Interaction: Vocera web     Purpose of Notification: Pt requesting dose pf 50 mg benadryl instead of 25 mg     Orders Received: no change     Comments:

## 2025-06-11 NOTE — PROGRESS NOTES
"Paged for IV start.  Patient states she is surprised anyone was able to start an IV on her.  Patient has two PIVs presently from surgery.  Patient states she can only have tape over her PIVs, not tegaderm.  Offer patient hypoallergenic dressing, Opsite.  Explain that mere tape over IV site not effective bacteria barrier.  Warm pack to right forearm, vein well-visualized.  22ga introduced through skin, patient begins writhing in bed, drawing arm away, complaining of inordinate amount of pain though needle not being moved once patient begins complaining of discomfort.  Patient states \"the IVs never work on me.\"  Explain to patient that this author can visualize the vein, but unwilling and unable to proceed if patient can not tolerate the pain.  Patient then states \"they need to hot pack me for at least a half hour to find a vein.\"  Explain to patient that the purpose of hot packing is to raise veins to the surface, and the hot pack placed by this author was effective.  Patient then states this author should try another location since this location not working.  Remind patient that the vein was visualized, and the site would likely have worked, but patient did not allow completion of attempt.  Attempt aborted.  On way out of patient's room, patient states \"they were supposed to put a line in me in surgery.\"  Inform primary RN that patient not cooperative with IV attempt. Reported that patient was requesting a PICC line.    "

## 2025-06-11 NOTE — PROGRESS NOTES
[facility]  Gastroenterology Progress Note     Natty Bateman MRN# [id number]   YOB: 1970 Age: 54 year old          Assessment and Plan:     Natty Bateman is a 54 year old female with extensive history including but not limited to type 2 diabetes mellitus, bipolar disorder, hypertension, hyperlipidemia, IBS, urinary retention s/p bladder stimulator, chronic pain, migraine, GERD, and insomnia among others who presented to [place] [address] ED with worsening lower chest / upper abdominal discomfort, chills, nausea. Noted to have significantly elevated LFTs, CT abdo/pelvis showed choledocholithiasis w/ intra & extrahepatic biliary ductal dilation.      Cholangitis (H)  Choledocholithiasis  Symptoms of chills, upper abdominal discomfort and nausea   CT abdo/pelvis showed choledocholithiasis w/ intra & extrahepatic biliary ductal dilation.   LFTs remain elevated and greatly improved  Leukocytosis 11.3k>17k>13.6. Blood culture positive for Klebsiella  Findings consistent with choledocholithiasis with cholangitis  6/9/25 ERCP found choledocholithiasis, complete removal accomplished by biliary sphincterotomy and balloon extraction. Biliary sphincterotomy was performed. Pus and sludge and found. One temporary sten placed in CBD  6/10/25 s/p lap cholecystectomy     - Daily LFTs, CBC- may be difficult as hard to draw patient  - Advance to low fiber diet  - antiemetic, analgesic prn  - continue cefepime and metro- will need antibiotics for 5 further day  - Repeat ERCP with stent removal in 3 months           Interval History:     denies chest pain, denies shortness of breath, alert, oriented to person, place and time, and c/o ongoing abdominal pain, migraines              Review of Systems:     C: NEGATIVE for fever, chills, change in weight  E/M: NEGATIVE for ear, mouth and throat problems  R: NEGATIVE for significant cough or SOB  CV: NEGATIVE for chest pain, palpitations or  peripheral edema             Medications:   I have reviewed this patient's current medications  Current Facility-Administered Medications   Medication Dose Route Frequency Provider Last Rate Last Admin    Atogepant (QULIPTA) tablet 60 mg  60 mg Oral Daily Kofi Brown PA-C   60 mg at 06/10/25 0929    cefTRIAXone (ROCEPHIN) 2 g vial to attach to  ml bag for ADULTS or NS 50 ml bag for PEDS  2 g Intravenous Q24H Kofi Brown PA-C   2 g at 06/10/25 1311    [Held by provider] cloNIDine (CATAPRES) tablet 0.2 mg  0.2 mg Oral BID Kofi Brown PA-C        estradiol (ESTRACE) tablet 1 mg  1 mg Oral Daily Kofi Brown PA-C   1 mg at 06/10/25 1212    hydrOXYzine HCl (ATARAX) tablet 100 mg  100 mg Oral At Bedtime Kofi Brown PA-C   100 mg at 06/10/25 2326    hydrOXYzine HCl (ATARAX) tablet 50 mg  50 mg Oral BID Kofi Brown PA-C   50 mg at 06/10/25 1826    insulin aspart (NovoLOG) injection (RAPID ACTING)  1-7 Units Subcutaneous Q4H Kofi Brown PA-C   1 Units at 06/09/25 2251    Lidocaine (LIDOCARE) 4 % Patch 1 patch  1 patch Transdermal Q24H Kofi Brown PA-C        [Held by provider] linaclotide (LINZESS) capsule 72 mcg  72 mcg Oral QAM  Kofi Brown PA-C        metroNIDAZOLE (FLAGYL) infusion 500 mg  500 mg Intravenous Q12H Kofi Brown PA-C   500 mg at 06/10/25 2035    multivitamin, therapeutic (THERA-VIT) tablet 1 tablet  1 tablet Oral Daily Kofi Brown PA-C   1 tablet at 06/10/25 0929    pantoprazole (PROTONIX) EC tablet 40 mg  40 mg Oral BID AC Kofi Brown PA-C        QUEtiapine (SEROquel) tablet 50 mg  50 mg Oral At Bedtime Koif Brown PA-C   50 mg at 06/10/25 2326    [Held by provider] rosuvastatin (CRESTOR) tablet 5 mg  5 mg Oral At Bedtime Kofi Brown PA-C        sodium chloride (PF) 0.9% PF flush 3 mL  3 mL Intracatheter Q8H SYLVIA Kofi Brown, VEAR   3 mL at 06/10/25 1312    sodium chloride (PF) 0.9% PF flush 3 mL  3 mL Intracatheter Q8H SYLVIA  Kofi Brown PA-C   3 mL at 06/10/25 1312    sodium chloride 0.9% BOLUS 500 mL  500 mL Intravenous Once Kofi Brown PA-C        vilazodone (VIIBRYD) tablet 40 mg  40 mg Oral QAM Kofi Brown PA-C                      Physical Exam:   Vitals were reviewed  Vital Signs with Ranges  Temp:  [97.3  F (36.3  C)-99  F (37.2  C)] 98.6  F (37  C)  Pulse:  [] 82  Resp:  [15-34] 18  BP: (146-192)/() 181/101  SpO2:  [94 %-100 %] 96 %  I/O last 3 completed shifts:  In: 2235 [I.V.:2235]  Out: 4625 [Urine:4600; Blood:25]  Constitutional: Alert, oriented to person, place, date, situation.  Cooperative, lying in bed in NAD.   Respiratory:  Lungs CTAB.  No crackles, wheezes, or rhonchi, no labored breathing.  Cardiovascular:  Heart RRR, no MRG, no edema.  GI:  Abdomen soft, tender RUQ and epigastric area ND and with normoactive BS  Skin/Integumen:  Warm, dry, non-diaphoretic.  MSK: CMS x4 intact.             Data:   I reviewed the patient's new clinical lab test results.   Recent Labs   Lab Test 06/10/25  0639 06/09/25  0704 02/01/25  1607   WBC 13.6* 17.0* 6.9   HGB 11.5* 11.8 12.5   MCV 82 83 83    261 280     Recent Labs   Lab Test 06/10/25  0639 06/09/25  1639 06/09/25  0704   POTASSIUM 3.7 3.7 4.1   CHLORIDE 104 102 102   CO2 20* 20* 18*   BUN 7.5 11.2 11.7   ANIONGAP 13 11 16*     Recent Labs   Lab Test 06/10/25  0639 06/09/25  0704 02/01/25  1607   ALBUMIN 3.1* 3.1* 3.9   BILITOTAL 0.7 1.9* 0.4   * 160* 101*   AST 95* 171* 292*   LIPASE 165*  --  42       I reviewed the patient's new imaging results.    All laboratory data reviewed  All imaging studies reviewed by me.    Bushra Velasquez PA-C,  6/10/2025  [facility]  Office : [phone]  Cell: [phone] (Dr. Forman)  Cell: [phone] (Bushra Velasquez PA-C)

## 2025-06-11 NOTE — PROGRESS NOTES
[facility]    Medicine Progress Note - Hospitalist Service    Date of Admission:  6/8/2025    Assessment & Plan     Natty Bateman is a 54 year old female  type 2 diabetes mellitus, bipolar disorder, hypertension, hyperlipidemia, IBS, urinary retention s/p bladder stimulator, chronic pain, chronic and intractable migraine, GERD, and insomnia presented [place] ED with worsening lower chest /upper abdominal discomfort.       Patient presented to [place] ER with complaints of chest discomfort across the bra line that slowly began around 10 AM and worsened through the day.  Had gotten up to go to work and felt worse. ?  Slight shortness of breath.  Felt nauseous.  History of recurrent bladder infections and kidney stones.  No fever or chills.  Vitals showed a blood pressure 146/81 with a pulse of 131 temp of 90  Lactate returned at 4.1 with a repeat of 5.1.  Urine negative.  BMP sodium 130 potassium 3.6 carbon dioxide 25 with a creatinine of 0.76: Elevated liver test with a bilirubin of 2.3, , , alk phos 101 and lipase normal 172: Troponin normal white count 11.3 hemoglobin 12 D-dimer elevated at 1.94  CT abdomen pelvis showing a dilated gallbladder with new intra and extrahepatic biliary ductal dilatation with common bile duct 12 mm, 6.7 mm high density filling defect in the CBD compatible with choledocholithiasis.  Pancreas with no ductal dilatation: No other acute findings.  A CT angio of her chest was also done given elevated D-dimer showing no PE.  Ultimately she did develop a fever in the ER: She was started on cefepime and Flagyl.  Transferred to [facility] given sepsis with concerns of ascending cholangitis and need for ERCP     Sepsis  Ascending cholangitis with sepsis  Klebsiella pneumonia (bacteremia)   Patient with presentation to outside hospital and clinical presentation of sepsis with lactic acidosis 5.1, leukocytosis, elevated liver tests as above,  tachycardic to 131 >> started on treatment for sepsis.  CT abdomen pelvis showing new intra and extrahepatic biliary ductal dilation with CBD 12 and 6.7 mm density filling defect in the CBD  6/9 Patient with RUQ tenderness on examination   GI as well as seen the patient was plans for ERCP  Continue cefepime and Flagyl.  Additional fluids given sepsis.  Received 1 L prior to arrival >> given 900 cc overnight>> give 1 L saline bolus and increase IV fluids to 125  6/9 lactate of 1.5 on repeat this am.   6/9 ERCP with choledocholithiasis.  Major papilla appeared congested.  Complete removal with biliary sphincterotomy and balloon extraction.  Biliary sphincterotomy was performed.  Biliary tree was swept and pus and sludge were found.  One temporary stent was placed into the common bile duct.  6/9 continue on cefepime and Flagyl  GI okay to advance diet as tolerated.  ERCP 3 months to remove stent  (Follow-up office 2 weeks)  6/10 cultures from outside hospital with bacteremia with klebsiella pneumonia. (1 of 2) spoke with lab and ESBL negative.   Patient requested IV PICC/midline for her blood draws and IV Abx.   Requested input from ID as well on the duration of antibiotic therapy and tapering.  Source control with ERCP, stent  Surgical intervention to remove gallbladder     Sepsis   Elevated ddimer outside hospital  6/8 CT angio chest PE study no acute intrathoracic findings.  No PE.  No pleural effusions or pneumothorax.     Hypochloremic hyponatremia  Na 130, Cl 93 at 5pm 6/8 at [place] ED  IVF continued  Monitor BMP  6/10 BMP added on this am. >> Currently on NS at 100.   Fluid for sepsis 6/9 bolus given and increase IVF>> then to po diet by later in day      Migraine  Seen by neurology as an outpatient with intractable chronic migraines.  As outlined in neurology notes she has had multiple prior meds for her migraines.  Had Botox done 4/8/2025   She reports taking her Relpax before she came to [facility] at the  ER  Given as needed dose of Imitrex here   Still reporting her headaches are bad.  Would not continue to use triptans back-to-back will get a neurology consult  Noted patient has polypharmacy of migraine medications. (Ubrelvy, Imitrex injection, Imitrex tablet, Qulipta)   6/9 Neurology consult  6/10 neurology recommended patient continue on medications as per migraines from outpatient.  Atogepant 60 mg p.o. daily (resumed and patient bringing in home med is not on formulary)   Takes Ubrelvy for as needed at onset of headache ordered (patient brought in home supply)         IBS  Prior to admission on multiple medications including Bentyl 10 mg 3 times daily, Linzess every morning, Phenergan for nausea, Mounjaro for weight loss)  Currently awaiting return of her GI function will hold on multiple bowel regimen  6/10 reviewed meds this morning in detail with the patient>> she is aware that the Linzess has not been started, Phenergan she is okay not to resume as she has Zofran.  Mounjaro held given bowel concerns and she is in agreement.  Bentyl not started as well and she is okay understanding her bowel function is attempting to resume prior function and will be having surgery.   6/10 reviewed all meds today from home as outlined in agreement with plan to hold these currently  Readdress when these could be started potentially prior to discharge or on discharge        T2DM  A1c 6.1% recently, follows with Endo at [facility]  PTA mounjaro noted - previously on ozempic  hold PTA mounjaro:   Explained Glucophage on hold with lactic acidosis  Sliding scale insulin      Hypertension  Hyperlipidemia  vitals to be updated  PTA clonidine to resume as long as BP acceptable if/when verified  PTA statin held for now  6/9 blood pressure borderline lower with sepsis.  She is on clonidine 0.2 twice daily (and not getting reflex hypertension suggestive of lower than normal blood pressure)   Will resume clonidine as have dose  "today.     Bipolar disorder  Stable upon admission  Resume prior to admission meds not able to take p.o. after procedure  Seroquel 50 mg at bedtime  Vilazodone 40 mg po q am   6/10 patient was prescribed Atarax as prior to admission.>>  Reviewed today that she was upset she can take her home supply did not want to be charged for administration of Atarax     GERD  PPI continue      Chronic pain syndrome  - PTA regimen to continue as appropriate with acute pain regimen as above  Dilaudid given in the hospital but patient prefers to restart her Tylenol with codeine which was ordered.  Again explained that when she was n.p.o. and had her ERCP could not give these orally     IV access:  Patient upset that she is a difficult blood draw.  Asked the same where she can have a midline or PICC  Currently bacteremic  Will have ID address ABX regimen and ? PICC/midline with bacteremia but if leaving in next 2 days. ? If will be needed. Daily blood draws should be adequate now that sepsis treated.         Diet: Diet  Low Fiber Diet    DVT Prophylaxis: Pneumatic Compression Devices  Dent Catheter: Not present  Lines: None     Cardiac Monitoring: None  Code Status: No CPR- Do NOT Intubate      Clinically Significant Risk Factors        # Hypokalemia: Lowest K = 3.3 mmol/L in last 2 days, will replace as needed  # Hyponatremia: Lowest Na = 133 mmol/L in last 2 days, will monitor as appropriate       # Hypoalbuminemia: Lowest albumin = 3.1 g/dL at 6/10/2025  6:39 AM, will monitor as appropriate                # Overweight: Estimated body mass index is 29.23 kg/m  as calculated from the following:    Height as of this encounter: 1.6 m (5' 3\").    Weight as of this encounter: 74.8 kg (165 lb)., PRESENT ON ADMISSION            Social Drivers of Health    Depression: Not at risk (4/28/2025)    Received from [facility]    PHQ-2     PHQ-2 Total: 2   Recent Concern: Depression - At risk (1/28/2025)    " Received from [facility]    PHQ-2     PHQ-2 Total: 3   Housing Stability: Unknown (1/28/2025)    Received from [facility]    Housing Stability Vital Sign     Unable to Pay for Housing in the Last Year: No     Homeless in the Last Year: No   Physical Activity: Inactive (10/10/2023)    Received from [facility]    Exercise Vital Sign     Days of Exercise per Week: 0 days     Minutes of Exercise per Session: 0 min   Stress: Stress Concern Present (10/10/2023)    Received from [facility]    [employer] - Occupational Stress Questionnaire     Feeling of Stress : Very much          Disposition Plan     Medically Ready for Discharge: Anticipated in 2-4 Days             Waleska Kahn MD  Hospitalist Service  [facility]  Securely message with [employer] (more info)  Text page via [employer] Paging/Directory   ______________________________________________________________________    Interval History   Patient lying in bed, she notes she feels better however she is still itchy from likely her opiates.  She prefers to discharge in a.m.    Physical Exam   Vital Signs: Temp: 98.6  F (37  C) Temp src: Oral BP: (!) 143/85 Pulse: 82   Resp: 18 SpO2: 96 % O2 Device: None (Room air) Oxygen Delivery: 2 LPM  Weight: 165 lbs 0 oz    General Appearance: Well appearing for stated age.  Respiratory: CTAB, no rales or ronchi  Cardiovascular: S1, S2 normal, no murmurs  GI: non-tender on palpation, BS present      Medical Decision Making       55 MINUTES SPENT BY ME on the date of service doing chart review, history, exam, documentation & further activities per the note.      Data     I have personally reviewed the following data over the past 24 hrs:    9.0  \   11.4 (L)   / 297     137 101 4.1 (L) /  165 (H)   3.3 (L) 22 0.54 \     ALT: 77 (H) AST: 35 AP:  111 TBILI: 0.5   ALB: 3.2 (L) TOT PROTEIN: 5.7 (L) LIPASE: N/A       Imaging results reviewed over the past 24 hrs:   No results found for this or any previous visit (from the past 24 hours).

## 2025-06-11 NOTE — PROVIDER NOTIFICATION
Brief update:    Hypertensive into 180/100s    Clonidine held since admission (but not currently tachycardic)    10 mg IV labetalol x1 ordered.    Had hypotension during ERCP on 9th, isolated low BP (potentially related to 0.5 mg IV dilaudid given around that time?) around 9:45 on 6/10    Rogelio Rain MD

## 2025-06-12 VITALS
HEIGHT: 63 IN | TEMPERATURE: 98.1 F | OXYGEN SATURATION: 97 % | DIASTOLIC BLOOD PRESSURE: 85 MMHG | BODY MASS INDEX: 30.16 KG/M2 | SYSTOLIC BLOOD PRESSURE: 147 MMHG | WEIGHT: 170.19 LBS | HEART RATE: 65 BPM | RESPIRATION RATE: 16 BRPM

## 2025-06-12 LAB
GLUCOSE BLDC GLUCOMTR-MCNC: 105 MG/DL (ref 70–99)
GLUCOSE BLDC GLUCOMTR-MCNC: 85 MG/DL (ref 70–99)
HOLD SPECIMEN: NORMAL
PATH REPORT.COMMENTS IMP SPEC: NORMAL
PATH REPORT.COMMENTS IMP SPEC: NORMAL
PATH REPORT.FINAL DX SPEC: NORMAL
PATH REPORT.GROSS SPEC: NORMAL
PATH REPORT.MICROSCOPIC SPEC OTHER STN: NORMAL
PATH REPORT.RELEVANT HX SPEC: NORMAL
PHOTO IMAGE: NORMAL
POTASSIUM SERPL-SCNC: 3.3 MMOL/L (ref 3.4–5.3)
POTASSIUM SERPL-SCNC: 3.4 MMOL/L (ref 3.4–5.3)

## 2025-06-12 PROCEDURE — 99239 HOSP IP/OBS DSCHRG MGMT >30: CPT | Performed by: HOSPITALIST

## 2025-06-12 PROCEDURE — 36415 COLL VENOUS BLD VENIPUNCTURE: CPT | Performed by: STUDENT IN AN ORGANIZED HEALTH CARE EDUCATION/TRAINING PROGRAM

## 2025-06-12 PROCEDURE — 250N000013 HC RX MED GY IP 250 OP 250 PS 637: Performed by: HOSPITALIST

## 2025-06-12 PROCEDURE — 84132 ASSAY OF SERUM POTASSIUM: CPT | Performed by: STUDENT IN AN ORGANIZED HEALTH CARE EDUCATION/TRAINING PROGRAM

## 2025-06-12 PROCEDURE — 250N000013 HC RX MED GY IP 250 OP 250 PS 637: Performed by: PHYSICIAN ASSISTANT

## 2025-06-12 PROCEDURE — 250N000013 HC RX MED GY IP 250 OP 250 PS 637: Performed by: SPECIALIST

## 2025-06-12 RX ORDER — POTASSIUM CHLORIDE 1500 MG/1
40 TABLET, EXTENDED RELEASE ORAL ONCE
Status: COMPLETED | OUTPATIENT
Start: 2025-06-12 | End: 2025-06-12

## 2025-06-12 RX ADMIN — CLONIDINE HYDROCHLORIDE 0.2 MG: 0.1 TABLET ORAL at 08:53

## 2025-06-12 RX ADMIN — ESTRADIOL 1 MG: 1 TABLET ORAL at 08:54

## 2025-06-12 RX ADMIN — ACETAMINOPHEN AND CODEINE PHOSPHATE 2 TABLET: 300; 30 TABLET ORAL at 11:24

## 2025-06-12 RX ADMIN — METHOCARBAMOL 500 MG: 500 TABLET ORAL at 09:07

## 2025-06-12 RX ADMIN — AMOXICILLIN AND CLAVULANATE POTASSIUM 1 TABLET: 875; 125 TABLET, FILM COATED ORAL at 08:54

## 2025-06-12 RX ADMIN — VILAZODONE HYDROCHLORIDE 40 MG: 20 TABLET ORAL at 08:54

## 2025-06-12 RX ADMIN — DIPHENHYDRAMINE HYDROCHLORIDE 50 MG: 25 CAPSULE ORAL at 16:37

## 2025-06-12 RX ADMIN — DIPHENHYDRAMINE HYDROCHLORIDE 50 MG: 25 CAPSULE ORAL at 09:07

## 2025-06-12 RX ADMIN — MULTIVITAMIN TABLET 1 TABLET: TABLET at 08:55

## 2025-06-12 RX ADMIN — ACETAMINOPHEN AND CODEINE PHOSPHATE 2 TABLET: 300; 30 TABLET ORAL at 05:59

## 2025-06-12 RX ADMIN — METHOCARBAMOL 500 MG: 500 TABLET ORAL at 16:38

## 2025-06-12 RX ADMIN — HYDROXYZINE HYDROCHLORIDE 50 MG: 25 TABLET, FILM COATED ORAL at 08:55

## 2025-06-12 ASSESSMENT — ACTIVITIES OF DAILY LIVING (ADL)
ADLS_ACUITY_SCORE: 60

## 2025-06-12 NOTE — PLAN OF CARE
Goal Outcome Evaluation:       Discharge Note    Patient discharged to home via private vehicle  accompanied by significant other .  IV: Discontinued  Prescriptions filled and given to patient/family.   Belongings reviewed and sent with patient.   Home medications returned to patient: Yes  Equipment sent with: N/A.   patient verbalizes understanding of discharge instructions. AVS given to patient.     Potassium = 3.4, patient refused K replacement.

## 2025-06-12 NOTE — PLAN OF CARE
Goal Outcome Evaluation:       Date & Time: 06/12/2025  Surgery/POD#:     POD #3 ERCP  POD #2 lap. Yara.     Behavior & Aggression: Calm and cooperative, can be particular   Fall Risk: No   Orientation:AOX4  ABNL VS/O2:HTN, RA   ABNL Labs:  NA   Pain Management: Scheduled atarax, PRN tylenol 3,, toradol, for pain, PRN benadryl for itching   Bowel/Bladder: No BM, passing gas, voiding   Drains: NA   Wounds/incisions: x4 lap sites   Diet:Low-fiber, carb count   Activity: Ind   Tests/Procedures: NA  Anticipated  DC Date: TBD   Significant Information:

## 2025-06-12 NOTE — PROGRESS NOTES
[facility]  Gastroenterology Progress Note     Natty Bateman MRN# [id number]   YOB: 1970 Age: 54 year old          Assessment and Plan:   Natty Bateman is a 54 year old female with extensive history including but not limited to type 2 diabetes mellitus, bipolar disorder, hypertension, hyperlipidemia, IBS, urinary retention s/p bladder stimulator, chronic pain, migraine, GERD, and insomnia among others who presented to [place] [address] ED with worsening lower chest / upper abdominal discomfort, chills, nausea. Noted to have significantly elevated LFTs, CT abdo/pelvis showed choledocholithiasis w/ intra & extrahepatic biliary ductal dilation.      Cholangitis (H)  Choledocholithiasis  Symptoms of chills, upper abdominal discomfort and nausea   CT abdo/pelvis showed choledocholithiasis w/ intra & extrahepatic biliary ductal dilation.   LFTs remain elevated and greatly improved  Leukocytosis 11.3k>17k>13.6. Blood culture positive for Klebsiella  Findings consistent with choledocholithiasis with cholangitis  6/9/25 ERCP found choledocholithiasis, complete removal accomplished by biliary sphincterotomy and balloon extraction. Biliary sphincterotomy was performed. Pus and sludge and found. One temporary sten placed in CBD  6/10/25 s/p lap cholecystectomy     - Advance to low fiber diet  - antiemetic, analgesic prn  - Was treating with cefepime and metronidazole- ID consulted, input appreciated:  Discontinue IV Ceftriaxone and Metronidazole and transition to oral Augmentin for 5 days then discontinue antibiotics   --Repeat ERCP with stent removal in 3 months  --Follow up with [facility] in 2-4 weeks. Our clinical staff will call the patient to schedule. Office phone: [phone]          Interval History:     denies chest pain, denies shortness of breath, alert, oriented to person, place and time, and c/o ongoing abdominal pain.  No migraine now.              Review of  Systems:     C: NEGATIVE for fever, chills, change in weight  E/M: NEGATIVE for ear, mouth and throat problems  R: NEGATIVE for significant cough or SOB  CV: NEGATIVE for chest pain, palpitations or peripheral edema             Medications:   I have reviewed this patient's current medications  Current Facility-Administered Medications   Medication Dose Route Frequency Provider Last Rate Last Admin    amoxicillin-clavulanate (AUGMENTIN) 875-125 MG per tablet 1 tablet  1 tablet Oral Q12H [facility] (08/20) Yazmin Delacruz MD   1 tablet at 06/12/25 0854    Atogepant (QULIPTA) tablet 60 mg  60 mg Oral Daily Kofi Brown PA-C   60 mg at 06/12/25 0907    cloNIDine (CATAPRES) tablet 0.2 mg  0.2 mg Oral BID Waleska Driver MD   0.2 mg at 06/12/25 0853    estradiol (ESTRACE) tablet 1 mg  1 mg Oral Daily Kofi Brown PA-C   1 mg at 06/12/25 0854    hydrOXYzine HCl (ATARAX) tablet 100 mg  100 mg Oral At Bedtime Kofi Brown PA-C   100 mg at 06/11/25 2324    hydrOXYzine HCl (ATARAX) tablet 50 mg  50 mg Oral BID Kofi Brown PA-C   50 mg at 06/12/25 0855    insulin aspart (NovoLOG) injection (RAPID ACTING)  1-7 Units Subcutaneous Q4H Kofi Brown PA-C   1 Units at 06/09/25 2251    Lidocaine (LIDOCARE) 4 % Patch 1 patch  1 patch Transdermal Q24H Kofi Brown PA-C        [Held by provider] linaclotide (LINZESS) capsule 72 mcg  72 mcg Oral QAM  Kofi Brown PA-C        multivitamin, therapeutic (THERA-VIT) tablet 1 tablet  1 tablet Oral Daily Kofi Brown PA-C   1 tablet at 06/12/25 0855    pantoprazole (PROTONIX) EC tablet 40 mg  40 mg Oral BID  Kofi Brown PA-C        QUEtiapine (SEROquel) tablet 50 mg  50 mg Oral At Bedtime Kofi Brown PA-C   50 mg at 06/11/25 2324    [Held by provider] rosuvastatin (CRESTOR) tablet 5 mg  5 mg Oral At Bedtime Kofi Brown PA-C        sodium chloride (PF) 0.9% PF flush 3 mL  3 mL Intracatheter Q8H [facility] Kofi Brown PA-C   3 mL at 06/12/25 0603     sodium chloride (PF) 0.9% PF flush 3 mL  3 mL Intracatheter Q8H SYLVIA Kofi Brown PA-C   3 mL at 06/12/25 0603    sodium chloride 0.9% BOLUS 500 mL  500 mL Intravenous Once Kofi Brown PA-C        vilazodone (VIIBRYD) tablet 40 mg  40 mg Oral QAM Kofi Brown PA-C   40 mg at 06/12/25 0854                  Physical Exam:   Vitals were reviewed  Vital Signs with Ranges  Temp:  [98  F (36.7  C)-98.4  F (36.9  C)] 98.2  F (36.8  C)  Pulse:  [69-75] 75  Resp:  [16-18] 16  BP: (141-193)/() 146/80  SpO2:  [97 %] 97 %  I/O last 3 completed shifts:  In: 1320 [P.O.:1320]  Out: 2975 [Urine:2975]  Constitutional: Alert, oriented to person, place, date, situation.  Cooperative, lying in bed in NAD.   Respiratory:  Lungs CTAB.  No labored breathing.  Cardiovascular:  Heart RRR.  GI:  Abdomen soft, tender RUQ and epigastric area ND   Skin/Integumen:  No rashes, non-diaphoretic.  MSK: CMS x4 grossly intact.             Data:   I reviewed the patient's new clinical lab test results.   Recent Labs   Lab Test 06/11/25  1115 06/10/25  0639 06/09/25  0704   WBC 9.0 13.6* 17.0*   HGB 11.4* 11.5* 11.8   MCV 81 82 83    267 261     Recent Labs   Lab Test 06/12/25  0742 06/12/25  0056 06/11/25  1115 06/10/25  0639 06/09/25  1639   POTASSIUM 3.4 3.3* 3.3* 3.7 3.7   CHLORIDE  --   --  101 104 102   CO2  --   --  22 20* 20*   BUN  --   --  4.1* 7.5 11.2   ANIONGAP  --   --  14 13 11     Recent Labs   Lab Test 06/11/25  1115 06/10/25  0639 06/09/25  0704 02/01/25  1607   ALBUMIN 3.2* 3.1* 3.1* 3.9   BILITOTAL 0.5 0.7 1.9* 0.4   ALT 77* 120* 160* 101*   AST 35 95* 171* 292*   LIPASE  --  165*  --  42       I reviewed the patient's new imaging results.    All laboratory data reviewed  All imaging studies reviewed by me.    Kenneth Abrams PA-C,  6/12/2025  [facility]  Office : [phone]  Cell: [phone] (Dr. Forman)

## 2025-06-12 NOTE — DISCHARGE SUMMARY
"[facility]  Hospitalist Discharge Summary      Date of Admission:  6/8/2025  Date of Discharge:  6/12/2025  Discharging Provider: Waleska Kahn MD  Discharge Service: Hospitalist Service    Discharge Diagnoses   Please see below    Clinically Significant Risk Factors     # Obesity: Estimated body mass index is 30.15 kg/m  as calculated from the following:    Height as of this encounter: 1.6 m (5' 3\").    Weight as of this encounter: 77.2 kg (170 lb 3.1 oz).       Follow-ups Needed After Discharge   Follow-up Appointments       Follow Up      Dr. Boggs's surgical office will contact you in approximately 2-3 weeks to check on your progress and answer any questions you may have. If you are doing well, you will not need to return for a follow up appointment. If any concerns are identified over the phone, we will help you make an appointment to see a provider.  If you have not received a phone call, have any questions or concerns, or would like to be seen, please call us at [phone] and ask to speak with our nurse. We are located at [address] [place] [address].        Follow Up      Follow up with GI, within 2 - 3 weeks. No follow up labs or test are needed.for hospital follow- up.        Hospital Follow-up with Existing Primary Care Provider (PCP)          Schedule Primary Care visit within: 7 Days               Unresulted Labs Ordered in the Past 30 Days of this Admission       No orders found from 5/9/2025 to 6/9/2025.        These results will be followed up by     Discharge Disposition   Discharged to home  Condition at discharge: Stable    Hospital Course   Natty Bateman is a 54 year old female  type 2 diabetes mellitus, bipolar disorder, hypertension, hyperlipidemia, IBS, urinary retention s/p bladder stimulator, chronic pain, chronic and intractable migraine, GERD, and insomnia presented [place] ED with worsening lower chest /upper abdominal " discomfort.       Patient presented to [place] ER with complaints of chest discomfort across the bra line that slowly began around 10 AM and worsened through the day.  Had gotten up to go to work and felt worse. ?  Slight shortness of breath.  Felt nauseous.  History of recurrent bladder infections and kidney stones.  No fever or chills.  Vitals showed a blood pressure 146/81 with a pulse of 131 temp of 90  Lactate returned at 4.1 with a repeat of 5.1.  Urine negative.  BMP sodium 130 potassium 3.6 carbon dioxide 25 with a creatinine of 0.76: Elevated liver test with a bilirubin of 2.3, , , alk phos 101 and lipase normal 172: Troponin normal white count 11.3 hemoglobin 12 D-dimer elevated at 1.94  CT abdomen pelvis showing a dilated gallbladder with new intra and extrahepatic biliary ductal dilatation with common bile duct 12 mm, 6.7 mm high density filling defect in the CBD compatible with choledocholithiasis.  Pancreas with no ductal dilatation: No other acute findings.  A CT angio of her chest was also done given elevated D-dimer showing no PE.  Ultimately she did develop a fever in the ER: She was started on cefepime and Flagyl.  Transferred to [facility] given sepsis with concerns of ascending cholangitis and need for ERCP     Sepsis  Ascending cholangitis with sepsis  Klebsiella pneumonia (bacteremia)   Patient with presentation to outside hospital and clinical presentation of sepsis with lactic acidosis 5.1, leukocytosis, elevated liver tests as above, tachycardic to 131 >> started on treatment for sepsis.  CT abdomen pelvis showing new intra and extrahepatic biliary ductal dilation with CBD 12 and 6.7 mm density filling defect in the CBD  6/9 Patient with RUQ tenderness on examination   GI as well as seen the patient was plans for ERCP  Continue cefepime and Flagyl.  Additional fluids given sepsis.  Received 1 L prior to arrival >> given 900 cc overnight>> give 1 L saline bolus and increase IV  fluids to 125  6/9 lactate of 1.5 on repeat this am.   6/9 ERCP with choledocholithiasis.  Major papilla appeared congested.  Complete removal with biliary sphincterotomy and balloon extraction.  Biliary sphincterotomy was performed.  Biliary tree was swept and pus and sludge were found.  One temporary stent was placed into the common bile duct.  6/9 continue on cefepime and Flagyl  GI okay to advance diet as tolerated.  ERCP 3 months to remove stent  (Follow-up office 2 weeks)  6/10 cultures from outside hospital with bacteremia with klebsiella pneumonia. (1 of 2) spoke with lab and ESBL negative.   Patient requested IV PICC/midline for her blood draws and IV Abx.   Requested input from ID as well on the duration of antibiotic therapy and tapering.  Source control with ERCP, stent  Surgical intervention to remove gallbladder     Sepsis   Elevated ddimer outside hospital  6/8 CT angio chest PE study no acute intrathoracic findings.  No PE.  No pleural effusions or pneumothorax.     Hypochloremic hyponatremia  Na 130, Cl 93 at 5pm 6/8 at [facility]  IVF continued  Monitor BMP       Migraine  Seen by neurology as an outpatient with intractable chronic migraines.  As outlined in neurology notes she has had multiple prior meds for her migraines.  Had Botox done 4/8/2025   She reports taking her Relpax before she came to [facility] at the ER  Given as needed dose of Imitrex here   Still reporting her headaches are bad.  Would not continue to use triptans back-to-back will get a neurology consult  Noted patient has polypharmacy of migraine medications. (Ubrelvy, Imitrex injection, Imitrex tablet, Qulipta)   6/9 Neurology consult  6/10 neurology recommended patient continue on medications as per migraines from outpatient.  Atogepant 60 mg p.o. daily (resumed and patient bringing in home med is not on formulary)   Takes Ubrelvy for as needed at onset of headache ordered (patient brought in home supply)         IBS  Prior  to admission on multiple medications including Bentyl 10 mg 3 times daily, Linzess every morning, Phenergan for nausea, Mounjaro for weight loss)  Currently awaiting return of her GI function will hold on multiple bowel regimen  6/10 reviewed meds this morning in detail with the patient>> she is aware that the Linzess has not been started, Phenergan she is okay not to resume as she has Zofran.  Mounjaro held given bowel concerns and she is in agreement.  Bentyl not started as well and she is okay understanding her bowel function is attempting to resume prior function and will be having surgery.   6/10 reviewed all meds today from home as outlined in agreement with plan to hold these currently  Readdress when these could be started potentially prior to discharge or on discharge        T2DM  A1c 6.1% recently, follows with Endo at [facility]  PTA mounjaro noted - previously on ozempic  hold PTA mounjaro:   Explained Glucophage on hold with lactic acidosis  Sliding scale insulin      Hypertension  Hyperlipidemia  vitals to be updated  PTA clonidine to resume as long as BP acceptable if/when verified  PTA statin held for now  6/9 blood pressure borderline lower with sepsis.  She is on clonidine 0.2 twice daily (and not getting reflex hypertension suggestive of lower than normal blood pressure)   Will resume clonidine as have dose today.     Bipolar disorder  Stable upon admission  Resume prior to admission meds not able to take p.o. after procedure  Seroquel 50 mg at bedtime  Vilazodone 40 mg po q am   6/10 patient was prescribed Atarax as prior to admission.>>  Reviewed today that she was upset she can take her home supply did not want to be charged for administration of Atarax     GERD  PPI continue      Chronic pain syndrome  - PTA regimen to continue as appropriate with acute pain regimen as above  Dilaudid given in the hospital but patient prefers to restart her Tylenol with codeine which was ordered.  Again  explained that when she was n.p.o. and had her ERCP could not give these orally     IV access:  Patient upset that she is a difficult blood draw.  Asked the same where she can have a midline or PICC  Currently bacteremic  Will have ID address ABX regimen and ? PICC/midline with bacteremia but if leaving in next 2 days. ? If will be needed. Daily blood draws should be adequate now that sepsis treated.     Consultations This Hospital Stay   GASTROENTEROLOGY IP CONSULT  VASCULAR ACCESS ADULT IP CONSULT  SURGERY GENERAL IP CONSULT  VASCULAR ACCESS ADULT IP CONSULT  NEUROLOGY IP CONSULT  PHARMACY IP CONSULT  VASCULAR ACCESS ADULT IP CONSULT  INFECTIOUS DISEASES IP CONSULT  VASCULAR ACCESS ADULT IP CONSULT    Code Status   No CPR- Do NOT Intubate    Time Spent on this Encounter   I, Waleska Kahn MD, personally saw the patient today and spent greater than 30 minutes discharging this patient.       Waleska Kahn MD  [address] [address]  Phone: [phone]  Fax: [phone]  ______________________________________________________________________    Physical Exam   Vital Signs: Temp: 98.2  F (36.8  C) Temp src: Oral BP: (!) 146/80 Pulse: 75   Resp: 16 SpO2: 97 % O2 Device: None (Room air)    Weight: 170 lbs 3.12 oz  General Appearance: Well appearing for stated age.  Respiratory: CTAB, no rales or ronchi  Cardiovascular: S1, S2 normal, no murmurs  GI: non-tender on palpation, BS present         Primary Care Physician   Bakari Castellanos    Discharge Orders      Activity    Please see attached discharge instructions.     Follow Up    Dr. Boggs's surgical office will contact you in approximately 2-3 weeks to check on your progress and answer any questions you may have. If you are doing well, you will not need to return for a follow up appointment. If any concerns are identified over the phone, we will help you make an appointment to see a provider.  If you have not  received a phone call, have any questions or concerns, or would like to be seen, please call us at [phone] and ask to speak with our nurse. We are located at [facility]  [place] [address].     Reason for your hospital stay    You were treated for ascending cholangitis.     Activity    Your activity upon discharge: activity as tolerated     Follow Up    Follow up with GI, within 2 - 3 weeks. No follow up labs or test are needed.for hospital follow- up.     Diet    Please see attached discharge instructions.     Diet    Follow this diet upon discharge: Current Diet:Orders Placed This Encounter      Diet      Low Fiber Diet     Hospital Follow-up with Existing Primary Care Provider (PCP)            Significant Results and Procedures   Most Recent 3 CBC's:  Recent Labs   Lab Test 06/11/25  1115 06/10/25  0639 06/09/25  0704   WBC 9.0 13.6* 17.0*   HGB 11.4* 11.5* 11.8   MCV 81 82 83    267 261     Most Recent 3 BMP's:  Recent Labs   Lab Test 06/12/25  1159 06/12/25  0759 06/12/25  0742 06/12/25  0056 06/11/25  2158 06/11/25  1532 06/11/25  1115 06/10/25  1319 06/10/25  0639 06/09/25  1652 06/09/25  1639   NA  --   --   --   --   --   --  137  --  137  --  133*   POTASSIUM  --   --  3.4 3.3*  --   --  3.3*  --  3.7  --  3.7   CHLORIDE  --   --   --   --   --   --  101  --  104  --  102   CO2  --   --   --   --   --   --  22  --  20*  --  20*   BUN  --   --   --   --   --   --  4.1*  --  7.5  --  11.2   CR  --   --   --   --   --   --  0.54  --  0.56  --  0.60   ANIONGAP  --   --   --   --   --   --  14  --  13  --  11   SYED  --   --   --   --   --   --  8.1*  --  8.0*  --  7.5*   * 85  --   --  109*   < > 165*   < > 103*  107*   < > 144*    < > = values in this interval not displayed.     Most Recent 2 LFT's:  Recent Labs   Lab Test 06/11/25  1115 06/10/25  0639   AST 35 95*   ALT 77* 120*   ALKPHOS 111 91   BILITOTAL 0.5 0.7   ,   Results for orders placed or performed during  the hospital encounter of 06/08/25   XR Surgery KESHA L/T 5 Min Fluoro w Stills    Narrative    This exam was marked as non-reportable because it will not be read by a   radiologist or a [place] non-radiologist provider.             Discharge Medications   Current Discharge Medication List        START taking these medications    Details   amoxicillin-clavulanate (AUGMENTIN) 875-125 MG tablet Take 1 tablet by mouth every 12 hours for 5 days.  Qty: 10 tablet, Refills: 0    Associated Diagnoses: Choledocholithiasis           CONTINUE these medications which have CHANGED    Details   acetaminophen-codeine (TYLENOL #3) 300-30 MG per tablet Take 1-2 tablets by mouth every 6 hours as needed for pain (1 tablet for moderate pain, 2 tablets for severe pain).  Qty: 20 tablet, Refills: 0    Associated Diagnoses: Postoperative pain           CONTINUE these medications which have NOT CHANGED    Details   aluminum chloride (DRYSOL) 20 % external solution Apply topically at bedtime.      aspirin (ASA) 81 MG chewable tablet Take 81 mg by mouth daily.      Atogepant (QULIPTA) 60 MG tablet Take 60 mg by mouth daily.      calcium polycarbophil (FIBERCON) 625 MG tablet Take 1 tablet by mouth every evening.      cloNIDine (CATAPRES) 0.1 MG tablet Take 0.2 mg by mouth 2 times daily.      diclofenac (VOLTAREN) 75 MG EC tablet Take 75 mg by mouth 2 times daily as needed for moderate pain.      dicyclomine (BENTYL) 10 MG capsule Take 10 mg by mouth 3 times daily (before meals). TAKE 1 CAPSULE BY MOUTH THREE TIMES A DAY BEFORE MEALS. TAKE AS NEEDED FOR ABDOMINAL PAIN/CRAMPING      estradiol (ESTRACE) 1 MG tablet Take 1 mg by mouth daily.      hydrOXYzine HCl (ATARAX) 50 MG tablet Take by mouth See Admin Instructions. Take 1 tablet by mouth twice a day and 2 tablets at bedtime.      Lidocaine (LIDOCARE) 4 % Patch Place 1 patch onto the skin as needed for moderate pain. To prevent lidocaine toxicity, patient should be patch free for 12 hrs  daily.      linaclotide (LINZESS) 72 MCG capsule Take 72 mcg by mouth every morning (before breakfast).      Magnesium Citrate 100 MG TABS Take by mouth every morning. Pt unsure of dose.      MAGNESIUM GLYCINATE PO Take by mouth every evening. Pt unsure of dose.      metFORMIN (GLUCOPHAGE-XR) 750 MG 24 hr tablet Take 750 mg by mouth daily (with breakfast).      methocarbamol (ROBAXIN) 750 MG tablet Take 750 mg by mouth nightly as needed for muscle spasms.      minoxidil (LONITEN) 2.5 MG tablet Take 1.25 mg by mouth daily.      multivitamin, therapeutic (THERA-VIT) TABS tablet Take 1 tablet by mouth daily.      omeprazole (PRILOSEC) 40 MG DR capsule Take 40 mg by mouth 2 times daily.      promethazine (PHENERGAN) 12.5 MG tablet Take 12.5 mg by mouth 2 times daily as needed for nausea.      QUEtiapine (SEROQUEL) 50 MG tablet Take 50 mg by mouth at bedtime.      rosuvastatin (CRESTOR) 5 MG tablet Take 5 mg by mouth at bedtime.      sennosides (SENOKOT) 8.6 MG tablet Take 2 tablets by mouth at bedtime.      SUMAtriptan (IMITREX) 100 MG tablet Take 100 mg by mouth at onset of headache for migraine. May repeat after 2 hours if necessary, not to exceed 200mg in 24hrs      SUMAtriptan (IMITREX) 6 MG/0.5ML injection Inject 6 mg subcutaneously at onset of headache for migraine. May repeat 1 hour after the first dose. Do not exceed 2 doses per 24 hours*      tirzepatide (MOUNJARO) 10 MG/0.5ML SOAJ auto-injector pen Inject 10 mg subcutaneously once a week.      ubrogepant (UBRELVY) 100 MG tablet Take 100 mg by mouth at onset of headache (for migraine). May repeat after 2 hrs as needed. Max of 200 mg in 24 hours.      vilazodone (VIIBRYD) 40 MG TABS tablet Take 40 mg by mouth every morning.      Vitamin D3 (VITAMIN D, CHOLECALCIFEROL,) 25 mcg (1000 units) tablet Take 25 mcg by mouth daily.      ACE/ARB NOT PRESCRIBED, INTENTIONAL, by Other route continuous prn.      Blood Glucose Monitoring Suppl (ONE TOUCH ULTRA 2) W/DEVICE KIT  1 Device daily.  Qty: 1 kit, Refills: prn    Associated Diagnoses: Impaired glucose tolerance      DIABETIC STERILE LANCETS device 1 Device daily.  Qty: 1 Box, Refills: 12    Associated Diagnoses: Impaired glucose tolerance           Allergies   Allergies   Allergen Reactions    Adhesive Tape Dermatitis     Sensitivity to band-aids, NOT dermabond or liquid adhesives.    Depakene [Valproic Acid] Other (See Comments)    Fish Oil Rash    Geodon [Ziprasidone] Other (See Comments)    Tegaderm Transparent Dressing (Informational Only) Dermatitis    Vortioxetine Itching    Dilaudid [Hydromorphone] Itching    Oxycodone Itching

## 2025-06-15 ASSESSMENT — PATIENT HEALTH QUESTIONNAIRE - PHQ9: SUM OF ALL RESPONSES TO PHQ QUESTIONS 1-9: 13

## 2025-06-16 ENCOUNTER — RESULTS FOLLOW-UP (OUTPATIENT)
Dept: SURGERY | Facility: CLINIC | Age: 55
End: 2025-06-16

## 2025-06-16 ASSESSMENT — PATIENT HEALTH QUESTIONNAIRE - PHQ9
SUM OF ALL RESPONSES TO PHQ QUESTIONS 1-9: 13
10. IF YOU CHECKED OFF ANY PROBLEMS, HOW DIFFICULT HAVE THESE PROBLEMS MADE IT FOR YOU TO DO YOUR WORK, TAKE CARE OF THINGS AT HOME, OR GET ALONG WITH OTHER PEOPLE: VERY DIFFICULT

## 2025-06-19 ENCOUNTER — OFFICE VISIT (OUTPATIENT)
Dept: FAMILY MEDICINE | Facility: CLINIC | Age: 55
End: 2025-06-19
Attending: HOSPITALIST
Payer: COMMERCIAL

## 2025-06-19 VITALS
TEMPERATURE: 98.7 F | DIASTOLIC BLOOD PRESSURE: 76 MMHG | RESPIRATION RATE: 18 BRPM | HEIGHT: 64 IN | OXYGEN SATURATION: 99 % | WEIGHT: 165.5 LBS | SYSTOLIC BLOOD PRESSURE: 110 MMHG | HEART RATE: 74 BPM | BODY MASS INDEX: 28.25 KG/M2

## 2025-06-19 DIAGNOSIS — N89.8 VAGINAL ITCHING: ICD-10-CM

## 2025-06-19 DIAGNOSIS — A41.89 SEPSIS DUE TO OTHER ETIOLOGY (H): ICD-10-CM

## 2025-06-19 DIAGNOSIS — K80.50 CHOLEDOCHOLITHIASIS: Primary | ICD-10-CM

## 2025-06-19 DIAGNOSIS — K83.09 CHOLANGITIS (H): ICD-10-CM

## 2025-06-19 LAB
ERYTHROCYTE [DISTWIDTH] IN BLOOD BY AUTOMATED COUNT: 13.4 % (ref 10–15)
HCT VFR BLD AUTO: 33.9 % (ref 35–47)
HGB BLD-MCNC: 11.5 G/DL (ref 11.7–15.7)
MCH RBC QN AUTO: 28.3 PG (ref 26.5–33)
MCHC RBC AUTO-ENTMCNC: 33.9 G/DL (ref 31.5–36.5)
MCV RBC AUTO: 84 FL (ref 78–100)
PLATELET # BLD AUTO: 404 10E3/UL (ref 150–450)
RBC # BLD AUTO: 4.06 10E6/UL (ref 3.8–5.2)
WBC # BLD AUTO: 9.9 10E3/UL (ref 4–11)

## 2025-06-19 RX ORDER — FLUCONAZOLE 150 MG/1
150 TABLET ORAL ONCE
Qty: 1 TABLET | Refills: 0 | Status: SHIPPED | OUTPATIENT
Start: 2025-06-19 | End: 2025-06-19

## 2025-06-19 ASSESSMENT — PAIN SCALES - GENERAL: PAINLEVEL_OUTOF10: NO PAIN (0)

## 2025-06-19 NOTE — PROGRESS NOTES
"  Assessment & Plan     Choledocholithiasis  S/p ERCP with stent placement, s/o cholecystectomy.   Has Follow-up with GI scheduled.     Sepsis due to other etiology (H)  Resolved.   Took entire abx course as prescribed.     Cholangitis (H)  Resolved.   Has Follow-up with GI scheduled.     - CBC with platelets; Future  - Hepatic panel (Albumin, ALT, AST, Bili, Alk Phos, TP); Future  - CBC with platelets  - Hepatic panel (Albumin, ALT, AST, Bili, Alk Phos, TP)    Vaginal itching  Likely due to yeast infection after multiple courses of antibiotics.   (Did not check wet prep since she's using a vaginal antifungal cream)  - fluconazole (DIFLUCAN) 150 MG tablet; Take 1 tablet (150 mg) by mouth once for 1 dose.    Completed [place] paperwork with return to work date of 7/2/25.      MED REC REQUIRED  Post Medication Reconciliation Status:  Discharge medications reconciled, continue medications without change  BMI  Estimated body mass index is 28.82 kg/m  as calculated from the following:    Height as of this encounter: 1.614 m (5' 3.54\").    Weight as of this encounter: 75.1 kg (165 lb 8 oz).             Brenda Luz is a 54 year old, presenting for the following health issues:  Hospital F/U ([facility] 6/8-6/12)        6/19/2025     8:56 AM   Additional Questions   Roomed by Jesus ALBRECHT         6/19/2025   Forms   Any forms needing to be completed Yes     HPI          6/13/2025   Post Discharge Outreach   How are you doing now that you are home? I am in lot a pain. Pt feels okay to be at home.   How are your symptoms? (Red Flag symptoms escalate to triage hotline per guidelines) Unchanged   Does the patient have their discharge instructions?  Yes   Does the patient have questions regarding their discharge instructions?  No   Were you started on any new medications or were there changes to any of your previous medications?  Yes   Does the patient have all of their medications? Yes   Do you have questions regarding " "any of your medications?  No   Discharge Follow Up Appointment Date 6/16/2025   Discharge Follow Up Appointment Scheduled with? Primary Care Provider       Hospital Follow-up Visit:    Hospital/Nursing Home/IP Rehab Facility: [facility]  Most Recent Admission Date: 6/8/2025   Most Recent Admission Diagnosis: Choledocholithiasis - K80.50  Cholangitis (H) - K83.09     Most Recent Discharge Date: 6/12/2025   Most Recent Discharge Diagnosis: Choledocholithiasis - K80.50  Cholangitis (H) - K83.09  Postoperative pain - G89.18   Do you have any other stressors you would like to discuss with your provider? No    Problems taking medications regularly:  None  Medication changes since discharge: None  Problems adhering to non-medication therapy:  None    Summary of hospitalization:  [facility] discharge summary reviewed  Diagnostic Tests/Treatments reviewed.  Follow up needed: cbc, hepatic panel  Other Healthcare Providers Involved in Patient s Care:         surgery, GI  Update since discharge: improved.       Pain around incision sites with movement. This is improving.   Feels exhausted after being so ill, but feels like she's on the mend.     Took all of the antibiotics as prescribed.     Has vaginal itching for a few days. An otc vaginal yeast cream is minimally helpful, she'd like po fluconazole.     Migraines have been a bother since discharge, she's seeing her neurologist regularly.    Has upcoming Follow-up appt with Dr. Forman, understands plan is for repeat ERCP and stent removal in 3 months.     Plan of care communicated with patient             Review of Systems  CONSTITUTIONAL: NEGATIVE for fever, chills, change in weight  RESP: NEGATIVE for significant cough or SOB  CV: NEGATIVE for chest pain, palpitations or peripheral edema      Objective    /76   Pulse 74   Temp 98.7  F (37.1  C) (Tympanic)   Resp 18   Ht 1.614 m (5' 3.54\")   Wt 75.1 kg (165 lb 8 oz)   LMP " 04/12/2010   SpO2 99%   BMI 28.82 kg/m    Body mass index is 28.82 kg/m .  Physical Exam   GENERAL: alert and no distress  RESP: lungs clear to auscultation - no rales, rhonchi or wheezes  CV: regular rate and rhythm, normal S1 S2, no S3 or S4, no murmur, click or rub, no peripheral edema  ABDOMEN: soft, nontender, no hepatosplenomegaly, no masses and bowel sounds normal  Postop wounds on abdomen are covered with steri strips, dry. No surrounding erythema.         Signed Electronically by: CHAU Cool CNP    Answers submitted by the patient for this visit:  Patient Health Questionnaire (Submitted on 6/16/2025)  If you checked off any problems, how difficult have these problems made it for you to do your work, take care of things at home, or get along with other people?: Very difficult  PHQ9 TOTAL SCORE: 13

## 2025-06-23 ENCOUNTER — RESULTS FOLLOW-UP (OUTPATIENT)
Dept: FAMILY MEDICINE | Facility: CLINIC | Age: 55
End: 2025-06-23

## 2025-06-23 DIAGNOSIS — E87.6 HYPOKALEMIA: Primary | ICD-10-CM

## 2025-06-26 ENCOUNTER — TRANSFERRED RECORDS (OUTPATIENT)
Dept: HEALTH INFORMATION MANAGEMENT | Facility: CLINIC | Age: 55
End: 2025-06-26

## 2025-06-26 ENCOUNTER — TELEPHONE (OUTPATIENT)
Dept: SURGERY | Facility: CLINIC | Age: 55
End: 2025-06-26
Payer: COMMERCIAL

## 2025-06-26 NOTE — TELEPHONE ENCOUNTER
[facility]  Post op call note - Laparoscopic Cholecystectomy    Natty Bateman was called for an update regarding her recovery.  She underwent surgery on 6/10 with Dr. Boggs.      Today, she says she has been recovering well since the procedure but has been feeling more weak than she is used to. She states that she has been having increased frequency of bowel movements but reported no other concerns of fever, n/v. She has an appointment with her GI doctor later today and hopes that as her body adapts to processing food without her gallbladder that her bowel movements will become more regular. She had reviewed the pathology below.     Pathology:  Final Diagnosis   Gallbladder, cholecystectomy:  - Acute and chronic cholecystitis.  - Negative for malignancy.          She was advised she may remove steri strips. Keep the wounds clean after removal.  She may advance her activity as tolerated but avoid heavy lifting initially.  The patient states all of her questions were answered and she understands our discussion.  She can follow up as needed and to call our office with any concerns.    Lakesha Mann  [phone]

## 2025-06-27 ENCOUNTER — TELEPHONE (OUTPATIENT)
Dept: FAMILY MEDICINE | Facility: CLINIC | Age: 55
End: 2025-06-27
Payer: MEDICARE

## 2025-06-27 ENCOUNTER — MYC MEDICAL ADVICE (OUTPATIENT)
Dept: FAMILY MEDICINE | Facility: CLINIC | Age: 55
End: 2025-06-27
Payer: MEDICARE

## 2025-06-27 NOTE — TELEPHONE ENCOUNTER
Forms printed and placed in Kristina Mullen's inbox. Pt requesting forms be completed ASAP.     Appointment required to completed these forms?      Lucrecia Viveros on 6/27/2025 at 12:39 PM

## 2025-06-27 NOTE — TELEPHONE ENCOUNTER
Forms/Letter Request    Type of form/letter: OTHER: Work Form       Do we have the form/letter: No    Who is the form from? Employer Form (if other please explain)    Where did/will the form come from? Patient will send form via [employer]    When is form/letter needed by: As Soon As Possible    How would you like the form/letter returned: Fax : back to Employer    Patient Notified form requests are processed in 5-7 business days:Yes    Could we send this information to you in [employer] or would you prefer to receive a phone call?:   No preference   Okay to leave a detailed message?: Yes at Cell number on file:    Telephone Information:   Mobile [phone]

## 2025-06-30 ENCOUNTER — MYC MEDICAL ADVICE (OUTPATIENT)
Dept: FAMILY MEDICINE | Facility: CLINIC | Age: 55
End: 2025-06-30
Payer: MEDICARE

## 2025-06-30 NOTE — TELEPHONE ENCOUNTER
[facility] [employer] [facility].     Faxed Return to Work form to [facility] at fax # [phone].    [facility] and filed Team 3.     Lucrecia Viveros on 6/30/2025 at 11:23 AM

## 2025-07-01 ENCOUNTER — TELEPHONE (OUTPATIENT)
Dept: FAMILY MEDICINE | Facility: CLINIC | Age: 55
End: 2025-07-01
Payer: MEDICARE

## 2025-07-01 NOTE — TELEPHONE ENCOUNTER
Forms/Letter Request    Type of form/letter: OTHER: [facility]  Report of Workability / Return to Work form     Do we have the form/letter: Yes, Will Monterroso's inbox.     Who is the form from? Patient    Where did/will the form come from? form was sent via [employer]    When is form/letter needed by: ASAP 7/2/25    How would you like the form/letter returned:   Fax to employer at [phone]     Lucrecia Viveros on 7/1/2025 at 1:24 PM

## 2025-07-03 NOTE — TELEPHONE ENCOUNTER
Form completed and signed by CHAU Oneill PRN.    Placed form at  for in-person pick-up.     Form faxed to [facility] and filed Team 3.     Lucrecia Viveros on 7/3/2025 at 4:23 PM

## 2025-07-10 ENCOUNTER — VIRTUAL VISIT (OUTPATIENT)
Dept: FAMILY MEDICINE | Facility: CLINIC | Age: 55
End: 2025-07-10
Payer: MEDICARE

## 2025-07-10 DIAGNOSIS — R14.0 ABDOMINAL BLOATING: Primary | ICD-10-CM

## 2025-07-10 NOTE — PROGRESS NOTES
"Natty is a 54 year old who is being evaluated via a billable video visit.    How would you like to obtain your AVS? MyChart  If the video visit is dropped, the invitation should be resent by: Text to cell phone: [phone]  Will anyone else be joining your video visit? No      Assessment & Plan     Abdominal bloating  Continue PPI  Recommend talking to Dr. Forman's office about symptoms, they will likely recommend additional evaluation/testing at the time of the already planned endoscopic exam in August.       BMI  Estimated body mass index is 28.82 kg/m  as calculated from the following:    Height as of 6/19/25: 1.614 m (5' 3.54\").    Weight as of 6/19/25: 75.1 kg (165 lb 8 oz).           Subjective   Natty is a 54 year old, presenting for the following health issues:  RECHECK      Video Start Time: Joined the call at 7/10/2025, 9:45:17 am.    History of Present Illness       Reason for visit:  Follow up    She eats 0-1 servings of fruits and vegetables daily.She consumes 2 sweetened beverage(s) daily.She exercises with enough effort to increase her heart rate 9 or less minutes per day.  She exercises with enough effort to increase her heart rate 3 or less days per week.   She is taking medications regularly.        Upper abdominal bloating. \"Feels like gas\" Episodes last several seconds, ongoing 2 weeks, has 5-6 per day. Not associated with eating. Drinks a pop when she has the symptom and feels it helps minimally.     Symptoms don't seem to be related to mounjaro, metformin.     Has changed diet recently since hospitalization, now eating lower fat.         Planned ERCP in Aug to remove stent, follow with Dr. Forman    Takes PPI bid.               Review of Systems  Doesn't notice reflux, belching, gassiness, constipation, diarrhea, nausea, vomiting.       Objective           Vitals:  No vitals were obtained today due to virtual visit.    Physical Exam   GENERAL: alert and no distress  EYES: Eyes grossly normal to " inspection.  No discharge or erythema, or obvious scleral/conjunctival abnormalities.  RESP: No audible wheeze, cough, or visible cyanosis.    SKIN: Visible skin clear. No significant rash, abnormal pigmentation or lesions.  NEURO: Cranial nerves grossly intact.  Mentation and speech appropriate for age.  PSYCH: Appropriate affect, tone, and pace of words          Video-Visit Details    Type of service:  Video Visit   Video End Time:Joined the call at 7/10/2025, 9:45:17 am.  Originating Location (pt. Location): Home    Distant Location (provider location):  On-site  Platform used for Video Visit: May  Signed Electronically by: CHAU Cool CNP

## 2025-07-13 ENCOUNTER — MYC MEDICAL ADVICE (OUTPATIENT)
Dept: FAMILY MEDICINE | Facility: CLINIC | Age: 55
End: 2025-07-13
Payer: MEDICARE

## 2025-07-13 DIAGNOSIS — B37.31 YEAST INFECTION OF THE VAGINA: Primary | ICD-10-CM

## 2025-07-14 RX ORDER — FLUCONAZOLE 150 MG/1
150 TABLET ORAL ONCE
Qty: 1 TABLET | Refills: 0 | Status: SHIPPED | OUTPATIENT
Start: 2025-07-14 | End: 2025-07-14

## 2025-07-14 NOTE — TELEPHONE ENCOUNTER
Chart reviewed. PCP is out of the office this week  She was treated with diflucan x 1  on 6.19.  I will sent 1 dose of diflucan to the pharmacy.  If she is having further symptoms, she should be reevaluated

## 2025-07-23 ENCOUNTER — HOSPITAL ENCOUNTER (INPATIENT)
Facility: CLINIC | Age: 55
DRG: 439 | End: 2025-07-23
Attending: INTERNAL MEDICINE | Admitting: INTERNAL MEDICINE
Payer: COMMERCIAL

## 2025-07-23 DIAGNOSIS — K29.60 BILIARY GASTRITIS: ICD-10-CM

## 2025-07-23 DIAGNOSIS — K85.10 ACUTE BILIARY PANCREATITIS WITHOUT INFECTION OR NECROSIS: Primary | ICD-10-CM

## 2025-07-23 PROBLEM — K85.90 PANCREATITIS: Status: ACTIVE | Noted: 2025-07-23

## 2025-07-23 LAB
ALBUMIN SERPL BCG-MCNC: 3.3 G/DL (ref 3.5–5.2)
ALP SERPL-CCNC: 48 U/L (ref 40–150)
ALT SERPL W P-5'-P-CCNC: 13 U/L (ref 0–50)
ANION GAP SERPL CALCULATED.3IONS-SCNC: 9 MMOL/L (ref 7–15)
AST SERPL W P-5'-P-CCNC: 17 U/L (ref 0–45)
BILIRUB DIRECT SERPL-MCNC: <0.08 MG/DL (ref 0–0.3)
BILIRUB SERPL-MCNC: 0.2 MG/DL
BUN SERPL-MCNC: 6.3 MG/DL (ref 6–20)
CALCIUM SERPL-MCNC: 8.4 MG/DL (ref 8.8–10.4)
CHLORIDE SERPL-SCNC: 105 MMOL/L (ref 98–107)
CREAT SERPL-MCNC: 0.67 MG/DL (ref 0.51–0.95)
CRP SERPL-MCNC: <3 MG/L
EGFRCR SERPLBLD CKD-EPI 2021: >90 ML/MIN/1.73M2
ERYTHROCYTE [DISTWIDTH] IN BLOOD BY AUTOMATED COUNT: 13.2 % (ref 10–15)
GLUCOSE BLDC GLUCOMTR-MCNC: 88 MG/DL (ref 70–99)
GLUCOSE SERPL-MCNC: 84 MG/DL (ref 70–99)
HCO3 SERPL-SCNC: 24 MMOL/L (ref 22–29)
HCT VFR BLD AUTO: 35.4 % (ref 35–47)
HGB BLD-MCNC: 12.1 G/DL (ref 11.7–15.7)
LIPASE SERPL-CCNC: 89 U/L (ref 13–60)
MCH RBC QN AUTO: 28.1 PG (ref 26.5–33)
MCHC RBC AUTO-ENTMCNC: 34.2 G/DL (ref 31.5–36.5)
MCV RBC AUTO: 82 FL (ref 78–100)
PLATELET # BLD AUTO: 314 10E3/UL (ref 150–450)
POTASSIUM SERPL-SCNC: 3.7 MMOL/L (ref 3.4–5.3)
PROT SERPL-MCNC: 5.5 G/DL (ref 6.4–8.3)
RBC # BLD AUTO: 4.31 10E6/UL (ref 3.8–5.2)
SODIUM SERPL-SCNC: 138 MMOL/L (ref 135–145)
WBC # BLD AUTO: 8.1 10E3/UL (ref 4–11)

## 2025-07-23 PROCEDURE — 36415 COLL VENOUS BLD VENIPUNCTURE: CPT | Performed by: PHYSICIAN ASSISTANT

## 2025-07-23 PROCEDURE — 82310 ASSAY OF CALCIUM: CPT | Performed by: INTERNAL MEDICINE

## 2025-07-23 PROCEDURE — 36415 COLL VENOUS BLD VENIPUNCTURE: CPT | Performed by: INTERNAL MEDICINE

## 2025-07-23 PROCEDURE — 258N000003 HC RX IP 258 OP 636: Performed by: INTERNAL MEDICINE

## 2025-07-23 PROCEDURE — 83690 ASSAY OF LIPASE: CPT | Performed by: INTERNAL MEDICINE

## 2025-07-23 PROCEDURE — 250N000013 HC RX MED GY IP 250 OP 250 PS 637: Performed by: INTERNAL MEDICINE

## 2025-07-23 PROCEDURE — 250N000013 HC RX MED GY IP 250 OP 250 PS 637: Performed by: HOSPITALIST

## 2025-07-23 PROCEDURE — 120N000001 HC R&B MED SURG/OB

## 2025-07-23 PROCEDURE — 86140 C-REACTIVE PROTEIN: CPT | Performed by: PHYSICIAN ASSISTANT

## 2025-07-23 PROCEDURE — 250N000011 HC RX IP 250 OP 636: Performed by: HOSPITALIST

## 2025-07-23 PROCEDURE — 250N000011 HC RX IP 250 OP 636: Performed by: INTERNAL MEDICINE

## 2025-07-23 PROCEDURE — 82248 BILIRUBIN DIRECT: CPT | Performed by: INTERNAL MEDICINE

## 2025-07-23 PROCEDURE — 99223 1ST HOSP IP/OBS HIGH 75: CPT | Performed by: INTERNAL MEDICINE

## 2025-07-23 PROCEDURE — 85027 COMPLETE CBC AUTOMATED: CPT | Performed by: INTERNAL MEDICINE

## 2025-07-23 PROCEDURE — 99207 PR NO CHARGE LOS: CPT | Performed by: HOSPITALIST

## 2025-07-23 RX ORDER — DIPHENHYDRAMINE HCL 25 MG
50 CAPSULE ORAL EVERY 6 HOURS PRN
Status: DISCONTINUED | OUTPATIENT
Start: 2025-07-23 | End: 2025-07-25 | Stop reason: HOSPADM

## 2025-07-23 RX ORDER — ESTRADIOL 1 MG/1
1 TABLET ORAL DAILY
Status: DISCONTINUED | OUTPATIENT
Start: 2025-07-23 | End: 2025-07-25 | Stop reason: HOSPADM

## 2025-07-23 RX ORDER — VILAZODONE HYDROCHLORIDE 20 MG/1
40 TABLET ORAL EVERY MORNING
Status: DISCONTINUED | OUTPATIENT
Start: 2025-07-23 | End: 2025-07-25 | Stop reason: HOSPADM

## 2025-07-23 RX ORDER — ONDANSETRON 4 MG/1
4 TABLET, ORALLY DISINTEGRATING ORAL EVERY 6 HOURS PRN
Status: DISCONTINUED | OUTPATIENT
Start: 2025-07-23 | End: 2025-07-25 | Stop reason: HOSPADM

## 2025-07-23 RX ORDER — METHOCARBAMOL 750 MG/1
750 TABLET, FILM COATED ORAL AT BEDTIME
Status: DISCONTINUED | OUTPATIENT
Start: 2025-07-23 | End: 2025-07-25 | Stop reason: HOSPADM

## 2025-07-23 RX ORDER — DIPHENHYDRAMINE HYDROCHLORIDE 50 MG/ML
50 INJECTION, SOLUTION INTRAMUSCULAR; INTRAVENOUS EVERY 6 HOURS PRN
Status: DISCONTINUED | OUTPATIENT
Start: 2025-07-23 | End: 2025-07-25 | Stop reason: HOSPADM

## 2025-07-23 RX ORDER — AMOXICILLIN 250 MG
1 CAPSULE ORAL 2 TIMES DAILY PRN
Status: DISCONTINUED | OUTPATIENT
Start: 2025-07-23 | End: 2025-07-25 | Stop reason: HOSPADM

## 2025-07-23 RX ORDER — ACETAMINOPHEN 325 MG/1
650 TABLET ORAL EVERY 4 HOURS PRN
Status: DISCONTINUED | OUTPATIENT
Start: 2025-07-23 | End: 2025-07-25 | Stop reason: HOSPADM

## 2025-07-23 RX ORDER — LIDOCAINE 40 MG/G
CREAM TOPICAL
Status: DISCONTINUED | OUTPATIENT
Start: 2025-07-23 | End: 2025-07-25 | Stop reason: HOSPADM

## 2025-07-23 RX ORDER — NALOXONE HYDROCHLORIDE 0.4 MG/ML
0.2 INJECTION, SOLUTION INTRAMUSCULAR; INTRAVENOUS; SUBCUTANEOUS
Status: DISCONTINUED | OUTPATIENT
Start: 2025-07-23 | End: 2025-07-25 | Stop reason: HOSPADM

## 2025-07-23 RX ORDER — BACLOFEN 5 MG/1
5 TABLET ORAL 3 TIMES DAILY PRN
COMMUNITY

## 2025-07-23 RX ORDER — LIDOCAINE 4 G/G
1 PATCH TOPICAL EVERY 24 HOURS
Status: DISCONTINUED | OUTPATIENT
Start: 2025-07-24 | End: 2025-07-25 | Stop reason: HOSPADM

## 2025-07-23 RX ORDER — NICOTINE POLACRILEX 4 MG
15-30 LOZENGE BUCCAL
Status: DISCONTINUED | OUTPATIENT
Start: 2025-07-23 | End: 2025-07-25 | Stop reason: HOSPADM

## 2025-07-23 RX ORDER — SODIUM CHLORIDE 9 MG/ML
INJECTION, SOLUTION INTRAVENOUS CONTINUOUS
Status: ACTIVE | OUTPATIENT
Start: 2025-07-23 | End: 2025-07-23

## 2025-07-23 RX ORDER — FLUTICASONE PROPIONATE 50 MCG
2 SPRAY, SUSPENSION (ML) NASAL DAILY PRN
COMMUNITY

## 2025-07-23 RX ORDER — CALCIUM CARBONATE 500 MG/1
1000 TABLET, CHEWABLE ORAL 4 TIMES DAILY PRN
Status: DISCONTINUED | OUTPATIENT
Start: 2025-07-23 | End: 2025-07-25 | Stop reason: HOSPADM

## 2025-07-23 RX ORDER — VILAZODONE HYDROCHLORIDE 10 MG/1
40 TABLET ORAL EVERY MORNING
Status: DISCONTINUED | OUTPATIENT
Start: 2025-07-24 | End: 2025-07-23

## 2025-07-23 RX ORDER — PROMETHAZINE HYDROCHLORIDE 25 MG/1
12.5 TABLET ORAL 2 TIMES DAILY PRN
Status: DISCONTINUED | OUTPATIENT
Start: 2025-07-23 | End: 2025-07-25 | Stop reason: HOSPADM

## 2025-07-23 RX ORDER — ONDANSETRON 2 MG/ML
4 INJECTION INTRAMUSCULAR; INTRAVENOUS EVERY 6 HOURS PRN
Status: DISCONTINUED | OUTPATIENT
Start: 2025-07-23 | End: 2025-07-25 | Stop reason: HOSPADM

## 2025-07-23 RX ORDER — ASPIRIN 81 MG/1
81 TABLET, CHEWABLE ORAL DAILY
Status: DISCONTINUED | OUTPATIENT
Start: 2025-07-23 | End: 2025-07-25 | Stop reason: HOSPADM

## 2025-07-23 RX ORDER — KETOROLAC TROMETHAMINE 30 MG/ML
30 VIAL (ML) INJECTION
COMMUNITY
End: 2025-07-29

## 2025-07-23 RX ORDER — SODIUM CHLORIDE 9 MG/ML
INJECTION, SOLUTION INTRAVENOUS CONTINUOUS
Status: ACTIVE | OUTPATIENT
Start: 2025-07-24 | End: 2025-07-24

## 2025-07-23 RX ORDER — DEXTROSE MONOHYDRATE 25 G/50ML
25-50 INJECTION, SOLUTION INTRAVENOUS
Status: DISCONTINUED | OUTPATIENT
Start: 2025-07-23 | End: 2025-07-25 | Stop reason: HOSPADM

## 2025-07-23 RX ORDER — OXYCODONE HYDROCHLORIDE 5 MG/1
5 TABLET ORAL EVERY 4 HOURS PRN
Refills: 0 | Status: DISCONTINUED | OUTPATIENT
Start: 2025-07-23 | End: 2025-07-25 | Stop reason: HOSPADM

## 2025-07-23 RX ORDER — FLUTICASONE PROPIONATE 50 MCG
2 SPRAY, SUSPENSION (ML) NASAL DAILY PRN
Status: DISCONTINUED | OUTPATIENT
Start: 2025-07-23 | End: 2025-07-25 | Stop reason: HOSPADM

## 2025-07-23 RX ORDER — CLONIDINE HYDROCHLORIDE 0.1 MG/1
0.2 TABLET ORAL 2 TIMES DAILY
Status: DISCONTINUED | OUTPATIENT
Start: 2025-07-23 | End: 2025-07-25 | Stop reason: HOSPADM

## 2025-07-23 RX ORDER — ACETAMINOPHEN 650 MG/1
650 SUPPOSITORY RECTAL EVERY 4 HOURS PRN
Status: DISCONTINUED | OUTPATIENT
Start: 2025-07-23 | End: 2025-07-25 | Stop reason: HOSPADM

## 2025-07-23 RX ORDER — NALOXONE HYDROCHLORIDE 0.4 MG/ML
0.4 INJECTION, SOLUTION INTRAMUSCULAR; INTRAVENOUS; SUBCUTANEOUS
Status: DISCONTINUED | OUTPATIENT
Start: 2025-07-23 | End: 2025-07-25 | Stop reason: HOSPADM

## 2025-07-23 RX ORDER — DIPHENHYDRAMINE HCL 25 MG
25 CAPSULE ORAL EVERY 6 HOURS PRN
Status: DISCONTINUED | OUTPATIENT
Start: 2025-07-23 | End: 2025-07-23

## 2025-07-23 RX ORDER — HYDROMORPHONE HCL IN WATER/PF 6 MG/30 ML
0.2 PATIENT CONTROLLED ANALGESIA SYRINGE INTRAVENOUS
Refills: 0 | Status: DISCONTINUED | OUTPATIENT
Start: 2025-07-23 | End: 2025-07-24

## 2025-07-23 RX ORDER — HYDROXYZINE HYDROCHLORIDE 25 MG/1
50 TABLET, FILM COATED ORAL 3 TIMES DAILY PRN
Status: DISCONTINUED | OUTPATIENT
Start: 2025-07-23 | End: 2025-07-25 | Stop reason: HOSPADM

## 2025-07-23 RX ORDER — BACLOFEN 5 MG/1
5 TABLET ORAL 3 TIMES DAILY PRN
Status: DISCONTINUED | OUTPATIENT
Start: 2025-07-23 | End: 2025-07-25 | Stop reason: HOSPADM

## 2025-07-23 RX ORDER — QUETIAPINE FUMARATE 25 MG/1
50 TABLET, FILM COATED ORAL AT BEDTIME
Status: DISCONTINUED | OUTPATIENT
Start: 2025-07-23 | End: 2025-07-25 | Stop reason: HOSPADM

## 2025-07-23 RX ORDER — AMOXICILLIN 250 MG
2 CAPSULE ORAL 2 TIMES DAILY PRN
Status: DISCONTINUED | OUTPATIENT
Start: 2025-07-23 | End: 2025-07-25 | Stop reason: HOSPADM

## 2025-07-23 RX ORDER — DIPHENHYDRAMINE HYDROCHLORIDE 50 MG/ML
25 INJECTION, SOLUTION INTRAMUSCULAR; INTRAVENOUS EVERY 6 HOURS PRN
Status: DISCONTINUED | OUTPATIENT
Start: 2025-07-23 | End: 2025-07-23

## 2025-07-23 RX ORDER — ROSUVASTATIN CALCIUM 5 MG/1
5 TABLET, COATED ORAL AT BEDTIME
Status: DISCONTINUED | OUTPATIENT
Start: 2025-07-23 | End: 2025-07-25 | Stop reason: HOSPADM

## 2025-07-23 RX ORDER — ONDANSETRON 4 MG/1
4 TABLET, ORALLY DISINTEGRATING ORAL EVERY 8 HOURS PRN
COMMUNITY

## 2025-07-23 RX ORDER — DICYCLOMINE HYDROCHLORIDE 10 MG/1
10 CAPSULE ORAL
Status: DISCONTINUED | OUTPATIENT
Start: 2025-07-23 | End: 2025-07-25 | Stop reason: HOSPADM

## 2025-07-23 RX ADMIN — OXYCODONE HYDROCHLORIDE 5 MG: 5 TABLET ORAL at 20:31

## 2025-07-23 RX ADMIN — DIPHENHYDRAMINE HYDROCHLORIDE 25 MG: 25 CAPSULE ORAL at 10:01

## 2025-07-23 RX ADMIN — QUETIAPINE FUMARATE 50 MG: 25 TABLET ORAL at 21:19

## 2025-07-23 RX ADMIN — VILAZODONE HYDROCHLORIDE 40 MG: 20 TABLET ORAL at 18:02

## 2025-07-23 RX ADMIN — FAMOTIDINE 20 MG: 10 INJECTION, SOLUTION INTRAVENOUS at 05:32

## 2025-07-23 RX ADMIN — OXYCODONE HYDROCHLORIDE 5 MG: 5 TABLET ORAL at 07:07

## 2025-07-23 RX ADMIN — DICYCLOMINE HYDROCHLORIDE 10 MG: 10 CAPSULE ORAL at 18:01

## 2025-07-23 RX ADMIN — DIPHENHYDRAMINE HYDROCHLORIDE 25 MG: 50 INJECTION, SOLUTION INTRAMUSCULAR; INTRAVENOUS at 04:12

## 2025-07-23 RX ADMIN — HYDROMORPHONE HYDROCHLORIDE 0.2 MG: 0.2 INJECTION, SOLUTION INTRAMUSCULAR; INTRAVENOUS; SUBCUTANEOUS at 10:01

## 2025-07-23 RX ADMIN — MINOXIDIL 1.25 MG: 2.5 TABLET ORAL at 18:02

## 2025-07-23 RX ADMIN — METHOCARBAMOL 750 MG: 750 TABLET ORAL at 21:19

## 2025-07-23 RX ADMIN — LINACLOTIDE 72 MCG: 72 CAPSULE, GELATIN COATED ORAL at 18:03

## 2025-07-23 RX ADMIN — ESTRADIOL 1 MG: 1 TABLET ORAL at 18:01

## 2025-07-23 RX ADMIN — OXYCODONE HYDROCHLORIDE 5 MG: 5 TABLET ORAL at 15:54

## 2025-07-23 RX ADMIN — SODIUM CHLORIDE: 0.9 INJECTION, SOLUTION INTRAVENOUS at 04:00

## 2025-07-23 RX ADMIN — HYDROXYZINE HYDROCHLORIDE 50 MG: 25 TABLET, FILM COATED ORAL at 20:37

## 2025-07-23 RX ADMIN — CLONIDINE HYDROCHLORIDE 0.2 MG: 0.1 TABLET ORAL at 20:25

## 2025-07-23 RX ADMIN — ROSUVASTATIN CALCIUM 5 MG: 5 TABLET, FILM COATED ORAL at 21:19

## 2025-07-23 RX ADMIN — HYDROMORPHONE HYDROCHLORIDE 0.2 MG: 0.2 INJECTION, SOLUTION INTRAMUSCULAR; INTRAVENOUS; SUBCUTANEOUS at 04:12

## 2025-07-23 RX ADMIN — HYDROXYZINE HYDROCHLORIDE 50 MG: 25 TABLET, FILM COATED ORAL at 15:54

## 2025-07-23 RX ADMIN — DIPHENHYDRAMINE HYDROCHLORIDE 25 MG: 25 CAPSULE ORAL at 15:09

## 2025-07-23 ASSESSMENT — ACTIVITIES OF DAILY LIVING (ADL)
ADLS_ACUITY_SCORE: 34
ADLS_ACUITY_SCORE: 60
ADLS_ACUITY_SCORE: 34
ADLS_ACUITY_SCORE: 34
ADLS_ACUITY_SCORE: 58
ADLS_ACUITY_SCORE: 34

## 2025-07-23 NOTE — PHARMACY-CONSULT NOTE
"The following home medications were NOT continued on inpatient admission per \"Discontinuation of nonessential home medications during hospitalization\" policy: Magnesium Citrate 100 mg chewable tablet daily     If a therapeutic holiday is deemed inappropriate per the prescriber, please notify the pharmacist regarding the medication order.    The pharmacist is available to answer any questions and/or concerns the patient may have regarding discontinuation of non-essential medications.    Please ensure that these medications are restarted as needed upon discharge via the medication reconciliation discharge process and included on the discharge medication reconciliation report.    Thank you,  Vidya Bryant KEERTHI    "

## 2025-07-23 NOTE — CONSULTS
"CLINICAL NUTRITION SERVICES - ASSESSMENT NOTE    Registered Dietitian Interventions:  - Encouraged smaller more frequent PO intake w/ adequate protein sources for muscle preservation once off NPO  - Discussed other nutrition supplements (Ensure Clear, Magic Cup, Gel+).     REASON FOR ASSESSMENT  Positive admission nutrition risk screen    INFORMATION OBTAINED  Assessed patient in room.    DX: acute pancreatitis    PMH: HTN, dyslipidemia, T2DM, ascending colon enteritis w/ sepsis, Klebsiella bacteremia, IBS, migraine, bipolar disorder, GERD, cholangitis, choledocholithiasis    NUTRITION HISTORY  - Pt reports possible 10 wt loss since laparoscopic cholecystectomy; also contributes it to Mounjaro.  - States UBW of 165 lbs for last few months.  - Having decrease appetite since procedure; states not being able to eat greasy foods (fries) are they don't sit well in her stomach  - Eats smaller meals & snacks throughout the day. Encouraged protein sources w/ meals  - Mentions common foods eaten such as watermelon, vegetables, hard boil eggs, cocktail shrimp (w/o sauce), and brussels sprouts from [employer] (daughter works there).   - Doesn't like taste of Ensure and is not interested in ordering.     CURRENT NUTRITION ORDERS  Diet: NPO    CURRENT INTAKE/TOLERANCE  - No intakes due to NPO order.     LABS  Nutrition-relevant labs: WNL.    MEDICATIONS  Nutrition-relevant medications:   Novolog  TUMS  Senna-doc    ANTHROPOMETRICS  Height: 161.4 cm (5'3.54\")  Admission Weight: 73.2 kg (161 lb 6 oz) (07/23/25 0300)   Most Recent Weight: 73.2 kg (161 lb 6 oz) (07/23/25 0300)  IBW: 53.6 kg  BMI: Body mass index is 28.1 kg/m .   Weight History:   Wt Readings from Last 10 Encounters:   07/23/25 73.2 kg (161 lb 6 oz)   06/19/25 75.1 kg (165 lb 8 oz)   06/12/25 77.2 kg (170 lb 3.1 oz)   02/01/25 77.1 kg (170 lb)   08/05/11 85.7 kg (189 lb)   06/06/11 90.7 kg (200 lb)   02/03/11 88.5 kg (195 lb)   12/27/10 89.4 kg (197 lb)   05/03/10 84.8 kg " (187 lb)   04/21/10 87.1 kg (192 lb)   5% wt loss in 1 month.     Dosing Weight: 73.2 kg, based on admission wt    ASSESSED NUTRITION NEEDS  Estimated Energy Needs: 0952-4989 kcals/day (25 - 30 kcals/kg)  Justification: Maintenance  Estimated Protein Needs: 73-88 grams protein/day (1 - 1.2 grams of pro/kg)  Justification: Maintenance  Estimated Fluid Needs: 0172-7415 mL/day (1 mL/kcal)  Justification: Maintenance    SYSTEM AND PHYSICAL FINDINGS    GI symptoms: Abdominal discomfort, not passing flatus. LBM not reported.  Skin/wounds: Reviewed.     MALNUTRITION  % Intake: < 75% for >/= 1 month (moderate)  % Weight Loss: 5% in 1 month (moderate)    Subcutaneous Fat Loss: Triceps: Mild  Muscle Loss: Calf (gastrocnemius): Mild  Fluid Accumulation/Edema: None noted  Malnutrition Diagnosis: Moderate malnutrition in the context of acute illness or injury  Malnutrition Present on Admission: Yes    NUTRITION DIAGNOSIS  Inadequate protein energy intake related to lack of appetite secondary to ERCP and laparoscopic cholecystectomy as evidenced by < 75% for >/= 1 month (moderate), 5% in 1 month (moderate), and mild fat and muscle loss.     INTERVENTIONS  See nutrition interventions above    GOALS  Patient to consume % of nutritionally adequate meal trays TID, or the equivalent with supplements/snacks.     MONITORING/EVALUATION  Progress toward goals will be monitored and evaluated per policy.

## 2025-07-23 NOTE — CONSULTS
[facility]  Gastroenterology Consultation         Natty Bateman  [address]   [place] [address]  54 year old female    Admission Date/Time: 7/23/2025  Primary Care Provider: [facility] [place]  Referring / Attending Physician:  Dr. Archibald    We were asked to see the patient in consultation by Dr. Archibald for evaluation of pancreatitis.      CC: abdominal pain, pancreatitis    HPI:  Natty Bateman is a 54 year old female with past medical history of hypertension, dyslipidemia, diabetes mellitus type 2, recent admission for ascending colon enteritis with sepsis status post ERCP with stent placement, Klebsiella bacteremia, IBS, migraine, bipolar disorder, GERD, chronic pain presented tp ER for evaluation of abdominal pain.     Prior to admission was seen at [facility] from 6/8-6/12/2025 with abdominal pain; CT abdomen and pelvis showed a dilated gallbladder with new intra and extrahepatic biliary ductal dilation and evidence of choledocholithiasis.  She seemed to have sepsis associated with ascending cholangitis.  She underwent ERCP with biliary sphincterotomy and temporary stent placed.  She also underwent laparoscopic cholecystectomy.  Blood cultures from outside hospital were positive for bacteremia with Klebsiella pneumonia; she was treated with cefepime and Flagyl in the hospital and discharged on Augmentin    The patient states that she was doing fine since discharge.  She reports having some intermittent nausea.  She reports that yesterday morning she started having abdominal pain located in epigastric area/across upper abdomen and into left flank.  She reported some abdominal distention.  She did have 3 large bowel movements yesterday morning.  She has mild nausea but no vomiting.  She denies fever, no chest pain, no shortness of breath.  She denies dysuria, no leg swellings.  Denies alcohol or tobacco use    ROS: A comprehensive ten point  review of systems was negative aside from those in mentioned in the HPI.      PAST MED HX:  I have reviewed this patient's medical history and updated it with pertinent information if needed.   Past Medical History:   Diagnosis Date    Abnormal glucose     ddd     cervical    Depression     Insomnia     Leiomyoma of uterus, unspecified 2007    Migraine headaches     Obesity 11/12/2009    Overweight(278.02)        MEDICATIONS:   Prior to Admission Medications   Prescriptions Last Dose Informant Patient Reported? Taking?   ACE/ARB NOT PRESCRIBED, INTENTIONAL,  Self Yes No   Sig: by Other route continuous prn.   Atogepant (QULIPTA) 60 MG tablet  Self Yes No   Sig: Take 60 mg by mouth daily.   Lidocaine (LIDOCARE) 4 % Patch   Yes No   Sig: Place 1 patch onto the skin as needed for moderate pain. To prevent lidocaine toxicity, patient should be patch free for 12 hrs daily.   MAGNESIUM GLYCINATE PO  Self Yes No   Sig: Take by mouth every evening. Pt unsure of dose.   Magnesium Citrate 100 MG TABS  Self Yes No   Sig: Take by mouth every morning. Pt unsure of dose.   QUEtiapine (SEROQUEL) 50 MG tablet  Self Yes No   Sig: Take 50 mg by mouth at bedtime.   SUMAtriptan (IMITREX) 100 MG tablet  Self Yes No   Sig: Take 100 mg by mouth at onset of headache for migraine. May repeat after 2 hours if necessary, not to exceed 200mg in 24hrs   SUMAtriptan (IMITREX) 6 MG/0.5ML injection  Self Yes No   Sig: Inject 6 mg subcutaneously at onset of headache for migraine. May repeat 1 hour after the first dose. Do not exceed 2 doses per 24 hours*   Vitamin D3 (VITAMIN D, CHOLECALCIFEROL,) 25 mcg (1000 units) tablet  Self Yes No   Sig: Take 25 mcg by mouth daily.   acetaminophen-codeine (TYLENOL #3) 300-30 MG per tablet   No No   Sig: Take 1-2 tablets by mouth every 6 hours as needed for pain (1 tablet for moderate pain, 2 tablets for severe pain).   aluminum chloride (DRYSOL) 20 % external solution  Self Yes No   Sig: Apply topically at  bedtime.   aspirin (ASA) 81 MG chewable tablet  Self Yes No   Sig: Take 81 mg by mouth daily.   calcium polycarbophil (FIBERCON) 625 MG tablet  Self Yes No   Sig: Take 1 tablet by mouth every evening.   cloNIDine (CATAPRES) 0.1 MG tablet  Self Yes No   Sig: Take 0.2 mg by mouth 2 times daily.   diclofenac (VOLTAREN) 75 MG EC tablet  Self Yes No   Sig: Take 75 mg by mouth 2 times daily as needed for moderate pain.   dicyclomine (BENTYL) 10 MG capsule  Self Yes No   Sig: Take 10 mg by mouth 3 times daily (before meals). TAKE 1 CAPSULE BY MOUTH THREE TIMES A DAY BEFORE MEALS. TAKE AS NEEDED FOR ABDOMINAL PAIN/CRAMPING   estradiol (ESTRACE) 1 MG tablet  Self Yes No   Sig: Take 1 mg by mouth daily.   hydrOXYzine HCl (ATARAX) 50 MG tablet  Self Yes No   Sig: Take by mouth See Admin Instructions. Take 1 tablet by mouth twice a day and 2 tablets at bedtime.   linaclotide (LINZESS) 72 MCG capsule  Self Yes No   Sig: Take 72 mcg by mouth every morning (before breakfast).   metFORMIN (GLUCOPHAGE-XR) 750 MG 24 hr tablet  Self Yes No   Sig: Take 750 mg by mouth daily (with breakfast).   methocarbamol (ROBAXIN) 750 MG tablet  Self Yes No   Sig: Take 750 mg by mouth nightly as needed for muscle spasms.   minoxidil (LONITEN) 2.5 MG tablet  Self Yes No   Sig: Take 1.25 mg by mouth daily.   multivitamin, therapeutic (THERA-VIT) TABS tablet  Self Yes No   Sig: Take 1 tablet by mouth daily.   omeprazole (PRILOSEC) 40 MG DR capsule  Self Yes No   Sig: Take 40 mg by mouth 2 times daily.   promethazine (PHENERGAN) 12.5 MG tablet  Self Yes No   Sig: Take 12.5 mg by mouth 2 times daily as needed for nausea.   rosuvastatin (CRESTOR) 5 MG tablet  Self Yes No   Sig: Take 5 mg by mouth at bedtime.   sennosides (SENOKOT) 8.6 MG tablet  Self Yes No   Sig: Take 2 tablets by mouth at bedtime.   tirzepatide (MOUNJARO) 10 MG/0.5ML SOAJ auto-injector pen  Self Yes No   Sig: Inject 10 mg subcutaneously once a week.   ubrogepant (UBRELVY) 100 MG tablet   Self Yes No   Sig: Take 100 mg by mouth at onset of headache (for migraine). May repeat after 2 hrs as needed. Max of 200 mg in 24 hours.   vilazodone (VIIBRYD) 40 MG TABS tablet  Self Yes No   Sig: Take 40 mg by mouth every morning.      Facility-Administered Medications: None       ALLERGIES:   Allergies   Allergen Reactions    Adhesive Tape Dermatitis     Sensitivity to band-aids, NOT dermabond or liquid adhesives.    Depakene [Valproic Acid] Other (See Comments)    Fentanyl Itching     Patient states she had full body itching when she woke up from surgery and believes it was Fentanyl that caused the itching. This was reported in a follow up phone call.     Fish Oil Rash    Geodon [Ziprasidone] Other (See Comments)    Tegaderm Transparent Dressing (Informational Only) Dermatitis    Vortioxetine Itching    Dilaudid [Hydromorphone] Itching    Oxycodone Itching       SOCIAL HISTORY:  Social History     Tobacco Use    Smoking status: Never    Smokeless tobacco: Never   Vaping Use    Vaping status: Never Used   Substance Use Topics    Alcohol use: Yes     Comment: minimal       FAMILY HISTORY:  Family History   Adopted: Yes   Problem Relation Age of Onset    Breast Cancer Maternal Aunt     Hypertension No family hx of     Diabetes No family hx of        PHYSICAL EXAM:   General  alert, oriented and comfortable  Vital Signs with Ranges  Temp: 97.5  F (36.4  C) Temp src: Oral BP: (!) 153/83 Pulse: 72   Resp: 16 SpO2: 97 % O2 Device: None (Room air)    No intake/output data recorded.    Constitutional: Alert, oriented to person, place, date, situation.  Cooperative, lying in bed in NAD.   Respiratory:  Lungs CTAB.  No crackles, wheezes, or rhonchi, no labored breathing.  Cardiovascular:  Heart RRR, no MRG, no edema.  GI:  Abdomen soft, tender over epigastric, LUQ and left flank, ND and with normoactive BS  Skin/Integumen:  Warm, dry, non-diaphoretic.  MSK: CMS x4 intact.            ADDITIONAL COMMENTS:   I reviewed the  patient's new clinical lab test results.   Recent Labs   Lab Test 07/23/25  0841 06/19/25  1149 06/11/25  1115   WBC 8.1 9.9 9.0   HGB 12.1 11.5* 11.4*   MCV 82 84 81    404 297     Recent Labs   Lab Test 07/23/25  0841 06/12/25  0742 06/12/25  0056 06/11/25  1115 06/10/25  0639   POTASSIUM 3.7 3.4 3.3* 3.3* 3.7   CHLORIDE 105  --   --  101 104   CO2 24  --   --  22 20*   BUN 6.3  --   --  4.1* 7.5   ANIONGAP 9  --   --  14 13     Recent Labs   Lab Test 07/23/25  0841 06/19/25  1149 06/11/25  1115 06/10/25  0639 06/09/25  0704 02/01/25  1607   ALBUMIN 3.3* 4.0 3.2* 3.1*   < > 3.9   BILITOTAL 0.2 0.3 0.5 0.7   < > 0.4   ALT 13 21 77* 120*   < > 101*   AST 17 21 35 95*   < > 292*   LIPASE 89*  --   --  165*  --  42    < > = values in this interval not displayed.       I reviewed the patient's new imaging results.        CONSULTATION ASSESSMENT AND PLAN:    Natty Bateman is a 54 year old female admitted on 7/23/2025. She Natty Bateman is a 54 year old female with past medical history of hypertension, dyslipidemia, diabetes mellitus type 2, recent admission for ascending colon enteritis with sepsis status post ERCP with stent placement, Klebsiella bacteremia, IBS, migraine, bipolar disorder, GERD, chronic pain-presented initially 32-12 ER for evaluation of abdominal pain.     Pancreatitis  Abdominal pain  S/p ERCP for cholangitis on 6/9/25  Presented with epigastric/upper abdomen pain started yesterday morning associated with some nausea but no vomiting. Pain located primarily on left and left flank- unusual presentation- pain more associated with musculoskeletal pain  Denies alcohol use, no viral illness, no fever, chills or rigors  Lipase 1100, LFTs normal  CT abdomen/pelvis done in ER:1.  Interval cholecystectomy. Plastic common bile duct stent has been placed with slight increase in the common bile duct dilatation now measuring 1.7 cm. Intrahepatic bile duct dilatation is slightly improved. There is new  pneumobilia. There is some biliary enhancement now seen.   2.  Tiny pleural effusions.   3.  A few tiny nonobstructing stones left kidney.   Findings indicative of acute pancreatitis possibly related to sludge or stone or virus vs other vs Related to PUD    - Discussed case with Dr. Forman  - Will plan EUS with ERCP and stent removal tomorrow  - start clear liquid diet  - continue supportive cares with prn pain control and antiemetics. As well as IVF  - NPO at MN  - Check CRP, daily CBC, CMP           [facility].  Office: [phone]  Cell : [phone] (Dr. Forman)  Cell: [phone] (Bushra Velasquez PA-C)       Detail Level: Generalized Detail Level: Zone Detail Level: Detailed Sunscreen Recommendations: Suggest using a sunscreen of SPF 30 or higher

## 2025-07-23 NOTE — PROGRESS NOTES
Hospitalist brief update note:    Chart reviewed and patient was seen this morning.  Reports upper abdominal pain although more on epigastric area.  No nausea.  Has been taking diclofenac for chronic pain.  No hematochezia or melena.  Noted LFTs normal.  Afebrile.  Although lipase mildly elevated, CT scan without pancreatic inflammation.  -Abdomen is tender mostly in epigastric although left upper quadrant and left periumbilical and right upper quadrant tenderness present.  No guarding or rigidity.  -Discussed with GI.  Clear liquid diet, n.p.o. after midnight, EUS with stent exchange tomorrow.  If febrile, low threshold to start antibiotic.  -PTA meds reviewed and resumed    Care plan discussed with patient and nursing staff    Ina Hand MD

## 2025-07-23 NOTE — PLAN OF CARE
Goal Outcome Evaluation:      Plan of Care Reviewed With: patient    Overall Patient Progress: no changeOverall Patient Progress: no change         Date & Time: 7/23/2025  [id number]  Here due to pancreatitis   Behavior & Aggression: Green  Fall Risk: No  Orientation: AOx4  ABNL VS/O2: Vss on RA ex HTN at the times  ABNL Labs: See labs  Pain Management:IV dilaudid  Bowel/Bladder: -BM, -gas, +BS. frequency urinate in BR.   Drains: PIV infusing   Wounds/incisions: abd scab from previous surgery.   Diet:NPO/ice chips  Number of times OUT OF BED this shift : 3, ind.  Tests/Procedures:   Anticipated  DC Date: pending   Significant Information: pt have reaction to IV dilaudid (itching), she take benadryl with IV dilaudid. Recent admitting for gall bladder removed.

## 2025-07-23 NOTE — H&P
[facility]    History and Physical - Hospitalist Service       Date of Admission:  7/23/2025    Assessment & Plan      Natty Bateman is a 54 year old female admitted on 7/23/2025. She Natty Bateman is a 54 year old female with past medical history of hypertension, dyslipidemia, diabetes mellitus type 2, recent admission for ascending colon enteritis with sepsis status post ERCP with stent placement, Klebsiella bacteremia, IBS, migraine, bipolar disorder, GERD, chronic pain-presented initially 32-12 ER for evaluation of abdominal pain.    As mentioned above the patient was admitted at [facility] from 6/8-6/12/2025 with abdominal pain; CT abdomen and pelvis showed a dilated gallbladder with new intra and extrahepatic biliary ductal dilation and evidence of choledocholithiasis.  She seemed to have sepsis associated with ascending cholangitis.  She underwent ERCP (Dr. Forman) with biliary sphincterotomy and temporary stent placed.  She also underwent laparoscopic cholecystectomy by surgery.  Blood cultures from outside hospital were positive for bacteremia with Klebsiella pneumonia; she was treated with cefepime and Flagyl in the hospital and discharged on Augmentin    # Acute pancreatitis  # History of gallstones/choledocholithiasis status post ERCP with stent placement on 6/9/2025 and status post laparoscopic cholecystectomy on 6/10/2025  -Presented with epigastric/upper abdomen pain started yesterday morning associated with some nausea but no vomiting  -Denies alcohol drinking  -No fever, no leukocytosis  CT abdomen/pelvis done in ER:  1.  Interval cholecystectomy. Plastic common bile duct stent has been placed with slight increase in the common bile duct dilatation now measuring 1.7 cm. Intrahepatic bile duct dilatation is slightly improved. There is new pneumobilia. There is some biliary enhancement now seen.   2.  Tiny pleural effusions.   3.  A few tiny nonobstructing  stones left kidney.      -Lipase 1144; LFTs in ER are within normal limits, no elevated bilirubin  -Admit inpatient status  -Repeat CBC, BMP, LFTs, lipase in a.m.-pending  -N.p.o.  -IV fluid  -Pepcid 20 mg IV twice daily  -Antiemetics as needed  -Pain management-Tylenol as needed, oxycodone as needed, IV Dilaudid as needed (she states that she cannot tolerate oxy and Dilaudid, cannot tolerate morphine)  -GI consult-Dr Forman    # Diabetes mellitus type 2  -PTA on Mounjaro and metformin-hold it for now  -Accu-Cheks every 4 hours while NPO  -HbA1c was 6.2 on 6/9/2025  -ISS  -Hypoglycemia protocol    # IBS  # GERD  - Awaiting medication reconciliation    # Bipolar disorder  - Resume PTA meds once verified    # Hypertension  # Dyslipidemia  - Resume PTA meds once verified    # Chronic pain syndrome  - Awaiting med rec         Diet: NPO for Procedure/Surgery per Anesthesia Guidelines Except for: Meds; Clear liquids before procedure/surgery: ADULT (Age GREATER than or Equal to 18 years) - Clear liquids 2 hours before procedure/surgery    DVT Prophylaxis: Pneumatic Compression Devices  Dent Catheter: Not present  Lines: None     Cardiac Monitoring: None  Code Status:  Full code, discussed with the patient    Clinically Significant Risk Factors Present on Admission                 # Drug Induced Platelet Defect: home medication list includes an antiplatelet medication   # Hypertension: Home medication list includes antihypertensive(s)                      Disposition Plan     Medically Ready for Discharge: Anticipated in 2-4 Days           Juani Archibald MD  Hospitalist Service  [facility]  Securely message with Miso (more info)  Text page via [employer] Paging/Directory     ______________________________________________________________________    Chief Complaint   Epigastric pain    History is obtained from the patient with historian.  I discussed with ER attending at [place] [id number] ER and I  reviewed her EMR.  She was transferred to [facility].    History of Present Illness   Natty Bateman is a 54 year old female with past medical history of hypertension, dyslipidemia, diabetes mellitus type 2, recent admission for ascending colon enteritis with sepsis status post ERCP with stent placement, Klebsiella bacteremia, IBS, migraine, bipolar disorder, GERD, chronic pain-presented initially 32-12 ER for evaluation of abdominal pain.    As mentioned above the patient was admitted at [facility] from 6/8-6/12/2025 with abdominal pain; CT abdomen and pelvis showed a dilated gallbladder with new intra and extrahepatic biliary ductal dilation and evidence of choledocholithiasis.  She seemed to have sepsis associated with ascending cholangitis.  She underwent ERCP (Dr. Forman) with biliary sphincterotomy and temporary stent placed.  She also underwent laparoscopic cholecystectomy by surgery.  Blood cultures from outside hospital were positive for bacteremia with Klebsiella pneumonia; she was treated with cefepime and Flagyl in the hospital and discharged on Augmentin  The patient states that she was doing fine since discharge.  She reports having some intermittent nausea.  She reports that yesterday morning she started having abdominal pain located in epigastric area/across upper abdomen.  She reported some abdominal distention.  She did have 3 large bowel movements yesterday morning.  Upon further questioning she reports she has mild nausea but no vomiting.  She denies fever, no chest pain, no shortness of breath.  She denies dysuria, no leg swellings.  She denies alcohol drinking.  She went to [place] [id number].  Blood work showed lipase at 1144, LFTs with albumin 3.4, total protein 5.8, total bili been 0.2, ALT 18, AST 27  Lactic acid 1.2  BMP with sodium 132, potassium 3.6, chloride 100, calcium 8.6, creatinine 0.62  CBC with no leukocytosis white blood cells 9.6, hemoglobin 11.8,  hematocrit 36.4, platelets count 323    CT abdomen/pelvis done in ER:  1.  Interval cholecystectomy. Plastic common bile duct stent has been placed with slight increase in the common bile duct dilatation now measuring 1.7 cm. Intrahepatic bile duct dilatation is slightly improved. There is new pneumobilia. There is some biliary enhancement now seen.   2.  Tiny pleural effusions.   3.  A few tiny nonobstructing stones left kidney.     In ER she was bolus NS 1000 cc, 1 dose of Dilaudid along with 1 dose of Benadryl and she was transferred to [facility] for GI evaluation    Of note, Dilaudid and oxycodone are listed on her allergy list list as she has but she states that she can tolerate these medications.  She cannot tolerate morphine.      Past Medical History    Past Medical History:   Diagnosis Date    Abnormal glucose     ddd     cervical    Depression     Insomnia     Leiomyoma of uterus, unspecified 2007    Migraine headaches     Obesity 11/12/2009    Overweight(278.02)        Past Surgical History   Past Surgical History:   Procedure Laterality Date    ENDOSCOPIC RETROGRADE CHOLANGIOPANCREATOGRAM COMPLEX N/A 6/9/2025    Procedure: ENDOSCOPIC RETROGRADE CHOLANGIOPANCREATOGRAPHY, COMPLEX;  Surgeon: Alberto Forman MD;  Location:  OR     REPAIR ROTATOR CUFF,ACUTE  2-2009    TONSILLECTOMY  age 33    TUBAL LIGATION  age 24    ZZC TOTAL ABDOM HYSTERECTOMY  5-2010    has ovaries, myomatous uterus       Prior to Admission Medications   Prior to Admission Medications   Prescriptions Last Dose Informant Patient Reported? Taking?   ACE/ARB NOT PRESCRIBED, INTENTIONAL,  Self Yes No   Sig: by Other route continuous prn.   Atogepant (QULIPTA) 60 MG tablet  Self Yes No   Sig: Take 60 mg by mouth daily.   Lidocaine (LIDOCARE) 4 % Patch   Yes No   Sig: Place 1 patch onto the skin as needed for moderate pain. To prevent lidocaine toxicity, patient should be patch free for 12 hrs daily.   MAGNESIUM GLYCINATE PO  Self Yes No    Sig: Take by mouth every evening. Pt unsure of dose.   Magnesium Citrate 100 MG TABS  Self Yes No   Sig: Take by mouth every morning. Pt unsure of dose.   QUEtiapine (SEROQUEL) 50 MG tablet  Self Yes No   Sig: Take 50 mg by mouth at bedtime.   SUMAtriptan (IMITREX) 100 MG tablet  Self Yes No   Sig: Take 100 mg by mouth at onset of headache for migraine. May repeat after 2 hours if necessary, not to exceed 200mg in 24hrs   SUMAtriptan (IMITREX) 6 MG/0.5ML injection  Self Yes No   Sig: Inject 6 mg subcutaneously at onset of headache for migraine. May repeat 1 hour after the first dose. Do not exceed 2 doses per 24 hours*   Vitamin D3 (VITAMIN D, CHOLECALCIFEROL,) 25 mcg (1000 units) tablet  Self Yes No   Sig: Take 25 mcg by mouth daily.   acetaminophen-codeine (TYLENOL #3) 300-30 MG per tablet   No No   Sig: Take 1-2 tablets by mouth every 6 hours as needed for pain (1 tablet for moderate pain, 2 tablets for severe pain).   aluminum chloride (DRYSOL) 20 % external solution  Self Yes No   Sig: Apply topically at bedtime.   aspirin (ASA) 81 MG chewable tablet  Self Yes No   Sig: Take 81 mg by mouth daily.   calcium polycarbophil (FIBERCON) 625 MG tablet  Self Yes No   Sig: Take 1 tablet by mouth every evening.   cloNIDine (CATAPRES) 0.1 MG tablet  Self Yes No   Sig: Take 0.2 mg by mouth 2 times daily.   diclofenac (VOLTAREN) 75 MG EC tablet  Self Yes No   Sig: Take 75 mg by mouth 2 times daily as needed for moderate pain.   dicyclomine (BENTYL) 10 MG capsule  Self Yes No   Sig: Take 10 mg by mouth 3 times daily (before meals). TAKE 1 CAPSULE BY MOUTH THREE TIMES A DAY BEFORE MEALS. TAKE AS NEEDED FOR ABDOMINAL PAIN/CRAMPING   estradiol (ESTRACE) 1 MG tablet  Self Yes No   Sig: Take 1 mg by mouth daily.   hydrOXYzine HCl (ATARAX) 50 MG tablet  Self Yes No   Sig: Take by mouth See Admin Instructions. Take 1 tablet by mouth twice a day and 2 tablets at bedtime.   linaclotide (LINZESS) 72 MCG capsule  Self Yes No   Sig:  Take 72 mcg by mouth every morning (before breakfast).   metFORMIN (GLUCOPHAGE-XR) 750 MG 24 hr tablet  Self Yes No   Sig: Take 750 mg by mouth daily (with breakfast).   methocarbamol (ROBAXIN) 750 MG tablet  Self Yes No   Sig: Take 750 mg by mouth nightly as needed for muscle spasms.   minoxidil (LONITEN) 2.5 MG tablet  Self Yes No   Sig: Take 1.25 mg by mouth daily.   multivitamin, therapeutic (THERA-VIT) TABS tablet  Self Yes No   Sig: Take 1 tablet by mouth daily.   omeprazole (PRILOSEC) 40 MG DR capsule  Self Yes No   Sig: Take 40 mg by mouth 2 times daily.   promethazine (PHENERGAN) 12.5 MG tablet  Self Yes No   Sig: Take 12.5 mg by mouth 2 times daily as needed for nausea.   rosuvastatin (CRESTOR) 5 MG tablet  Self Yes No   Sig: Take 5 mg by mouth at bedtime.   sennosides (SENOKOT) 8.6 MG tablet  Self Yes No   Sig: Take 2 tablets by mouth at bedtime.   tirzepatide (MOUNJARO) 10 MG/0.5ML SOAJ auto-injector pen  Self Yes No   Sig: Inject 10 mg subcutaneously once a week.   ubrogepant (UBRELVY) 100 MG tablet  Self Yes No   Sig: Take 100 mg by mouth at onset of headache (for migraine). May repeat after 2 hrs as needed. Max of 200 mg in 24 hours.   vilazodone (VIIBRYD) 40 MG TABS tablet  Self Yes No   Sig: Take 40 mg by mouth every morning.      Facility-Administered Medications: None        Review of Systems    The 10 point Review of Systems is negative other than noted in the HPI or here.     Social History   I have reviewed this patient's social history and updated it with pertinent information if needed.  Social History     Tobacco Use    Smoking status: Never    Smokeless tobacco: Never   Vaping Use    Vaping status: Never Used   Substance Use Topics    Alcohol use: Yes     Comment: minimal         Family History   I have reviewed this patient's family history and updated it with pertinent information if needed.  Family History   Adopted: Yes   Problem Relation Age of Onset    Breast Cancer Maternal Aunt      Hypertension No family hx of     Diabetes No family hx of          Allergies   Allergies   Allergen Reactions    Adhesive Tape Dermatitis     Sensitivity to band-aids, NOT dermabond or liquid adhesives.    Depakene [Valproic Acid] Other (See Comments)    Fentanyl Itching     Patient states she had full body itching when she woke up from surgery and believes it was Fentanyl that caused the itching. This was reported in a follow up phone call.     Fish Oil Rash    Geodon [Ziprasidone] Other (See Comments)    Tegaderm Transparent Dressing (Informational Only) Dermatitis    Vortioxetine Itching    Dilaudid [Hydromorphone] Itching    Oxycodone Itching        Physical Exam   Vital Signs: Temp: 97.9  F (36.6  C) Temp src: Oral BP: (!) 158/88 Pulse: 71   Resp: 19 SpO2: 98 % O2 Device: None (Room air)    Weight: 161 lbs 6.03 oz    General Appearance: Awake/alert, no acute distress  Respiratory: Bilateral air entry, no wheezing, no rales, no crackles  Cardiovascular: S1-S2, RRR, no murmurs, rubs  GI: Abdomen is soft, mild tender to palpation in epigastric and mid abdomen area, no rebound, bowel sounds are present  Skin: No rashes, no cyanosis  Neuro: AAOX 3, no focal neurological deficit    Medical Decision Making       75 MINUTES SPENT BY ME on the date of service doing chart review, history, exam, documentation & further activities per the note.      Data         Imaging results reviewed over the past 24 hrs:   Recent Results (from the past 24 hours)   CT Abdomen Pelvis w Contrast    Narrative    EXAM: CT ABDOMEN PELVIS W IV CONTRAST  LOCATION: [facility]  DATE: 7/23/2025    INDICATION: elevated lipase, s/p nicholas, ascending cholangitis  COMPARISON: Yes 6/8/25 CT AP W  TECHNIQUE: CT scan of the abdomen and pelvis was performed following injection  of IV contrast. Multiplanar reformats were obtained. Dose reduction techniques  were used.  CONTRAST: OMNI 350 100CC    FINDINGS:   LOWER CHEST: Tiny pleural  effusions.    HEPATOBILIARY: Interval cholecystectomy. Plastic common bile duct stent has been  placed with persistent dilatation of the common bile duct which measures 1.7 cm  proximally. Prestent this measured 1.4 cm. Intrahepatic bile duct dilatation  remains slightly improved. New pneumobilia. There is some biliary enhancement  now seen.    PANCREAS: Normal.    SPLEEN: Normal.    ADRENAL GLANDS: Normal.    KIDNEYS/BLADDER: A few tiny nonobstructing stones left kidney. Benign cyst left  kidney. No follow-up needed.    BOWEL: Normal.    LYMPH NODES: Normal.    VASCULATURE: Normal.    PELVIC ORGANS: Hysterectomy.    MUSCULOSKELETAL: Sacral stimulator.    IMPRESSION:   1.  Interval cholecystectomy. Plastic common bile duct stent has been placed  with slight increase in the common bile duct dilatation now measuring 1.7 cm.  Intrahepatic bile duct dilatation is slightly improved. There is new  pneumobilia. There is some biliary enhancement now seen.  2.  Tiny pleural effusions.  3.  A few tiny nonobstructing stones left kidney.    Electronically signed by: Duane Dobson MD 7/23/2025 12:47 AM CDT

## 2025-07-23 NOTE — PHARMACY-ADMISSION MEDICATION HISTORY
"Pharmacist Admission Medication History    Admission medication history is complete. The information provided in this note is only as accurate as the sources available at the time of the update.    Information Source(s): Patient, Family member, and CareEverywhere/SureScripts via in-person    Pertinent Information:   -tirzepatide: per Surescripts, last filled 7/9 as 12.5 mg solution to inject 12.5 mg weekly. Surescripts also lists 15 mg dose and was on list as 10 mg. Patient thinks it is 12.5 mg dose currently but isn't positive.    Changes made to PTA medication list:  Added: fluticasone, baclofen, Nurtec, B complex, Zofran, ketorolac  Deleted: magnesium glycinate  Changed: Tylenol #3 (1-2 tabs --> 1 tab), Drysol (at bedtime --> daily PRN), diclofenac (added with food), dicyclomine (changed from \"paused\" status to resumed/active per patient), lidocaine (4% --> 5%), Robaxin (750 mg at bedtime PRN --> 750 mg at bedtime & 750 mg daily PRN), tirzepatide (10 mg pen --> subQ, updated directions/added day of week, see note above)    Allergies reviewed with patient and updates made in EHR: no    Medication History Completed By: Nat Celis RPH 7/23/2025 12:04 PM    PTA Med List   Medication Sig Note Last Dose/Taking    acetaminophen-codeine (TYLENOL #3) 300-30 MG per tablet Take 1-2 tablets by mouth every 6 hours as needed for pain (1 tablet for moderate pain, 2 tablets for severe pain). (Patient taking differently: Take 1 tablet by mouth every 6 hours as needed for pain.)  Taking Differently    aluminum chloride (DRYSOL) 20 % external solution Apply topically daily as needed.  Taking As Needed    aspirin (ASA) 81 MG chewable tablet Take 81 mg by mouth daily.  Past Week    Atogepant (QULIPTA) 60 MG tablet Take 60 mg by mouth daily.  Past Week    Baclofen (LIORESAL) 5 MG tablet Take 5 mg by mouth 3 times daily as needed (hiccups).  Taking As Needed    calcium polycarbophil (FIBERCON) 625 MG tablet Take 1 tablet by mouth " every evening.  Past Week    cloNIDine (CATAPRES) 0.1 MG tablet Take 0.2 mg by mouth 2 times daily.  Past Week Morning    diclofenac (VOLTAREN) 75 MG EC tablet Take 75 mg by mouth 2 times daily as needed for moderate pain. With food  Taking As Needed    dicyclomine (BENTYL) 10 MG capsule Take 10 mg by mouth 3 times daily (before meals).  7/22/2025    estradiol (ESTRACE) 1 MG tablet Take 1 mg by mouth daily.  7/22/2025    fluticasone (FLONASE) 50 MCG/ACT nasal spray Spray 2 sprays into both nostrils daily as needed.  Taking As Needed    hydrOXYzine HCl (ATARAX) 50 MG tablet Take by mouth See Admin Instructions. Take 1 tablet by mouth twice a day and 2 tablets at bedtime. 7/23/2025: 2nd 50 mg dose ~5:30PM 7/22/2025    ketorolac (TORADOL) 30 MG/ML injection Inject 30 mg into the muscle once a week as needed.  Taking As Needed    lidocaine (LIDODERM) 5 % patch Place 1 patch onto the skin as needed for moderate pain. To prevent lidocaine toxicity, patient should be patch free for 12 hrs daily.  Taking As Needed    linaclotide (LINZESS) 72 MCG capsule Take 72 mcg by mouth every morning (before breakfast).  7/22/2025    MAGNESIUM CITRATE PO Take 1 capsule by mouth daily. afternoon  7/22/2025    metFORMIN (GLUCOPHAGE-XR) 750 MG 24 hr tablet Take 750 mg by mouth daily (with breakfast).  7/22/2025    methocarbamol (ROBAXIN) 750 MG tablet Take 750 mg by mouth at bedtime. Plus additional 750 mg daily PRN  Taking    minoxidil (LONITEN) 2.5 MG tablet Take 1.25 mg by mouth daily.  Past Week    multivitamin, therapeutic (THERA-VIT) TABS tablet Take 1 tablet by mouth daily.  Past Week    omeprazole (PRILOSEC) 40 MG DR capsule Take 40 mg by mouth 2 times daily.  7/22/2025 Morning    ondansetron (ZOFRAN ODT) 4 MG ODT tab Take 4 mg by mouth every 8 hours as needed for nausea.  Taking As Needed    promethazine (PHENERGAN) 12.5 MG tablet Take 12.5 mg by mouth 2 times daily as needed for nausea.  Taking As Needed    QUEtiapine (SEROQUEL)  50 MG tablet Take 50 mg by mouth at bedtime.  Past Week    rimegepant (NURTEC) 75 MG ODT tablet Place 75 mg under the tongue as needed for migraine.  Taking As Needed    rosuvastatin (CRESTOR) 5 MG tablet Take 5 mg by mouth at bedtime.  Past Week    sennosides (SENOKOT) 8.6 MG tablet Take 2 tablets by mouth at bedtime.  Past Week    SUMAtriptan (IMITREX) 100 MG tablet Take 100 mg by mouth at onset of headache for migraine. May repeat after 2 hours if necessary, not to exceed 200mg in 24hrs  Taking As Needed    SUMAtriptan (IMITREX) 6 MG/0.5ML injection Inject 6 mg subcutaneously at onset of headache for migraine. May repeat 1 hour after the first dose. Do not exceed 2 doses per 24 hours*  Taking As Needed    TIRZEPATIDE SC Inject subcutaneously once a week. On Wednesdays. Pt believes dose is 12.5 mg but not positive  Taking    ubrogepant (UBRELVY) 100 MG tablet Take 100 mg by mouth at onset of headache (for migraine). May repeat after 2 hrs as needed. Max of 200 mg in 24 hours.  Taking As Needed    vilazodone (VIIBRYD) 40 MG TABS tablet Take 40 mg by mouth every morning.  7/22/2025    vitamin B-Complex Take 1 tablet by mouth daily.  Past Week    Vitamin D3 (VITAMIN D, CHOLECALCIFEROL,) 25 mcg (1000 units) tablet Take 25 mcg by mouth daily.  Past Week

## 2025-07-23 NOTE — PLAN OF CARE
Date & Time: 7/23/25 [id number]  Surgery/POD#: Abdominal pain, pancreatitis  Behavior & Aggression: Green  Fall Risk: Yes  Orientation:A&O x 4  ABNL VS/O2:VSS on RA  Pain Management:IV dilaudid w/ Ibuprofen   Bowel/Bladder: Cont/ Cont  Drains: PIV SL  Wounds/incisions: Abdominal scab from previous surgery  Diet:Clear  Number of times OUT OF BED this shift 3  Tests/Procedures: Pending  Anticipated  DC Date: Pending  Significant Information: NPO @ midnight tonight for stent removal tomorrow; DO NOT GIVE DILAUDID- pt complains of allergic reaction

## 2025-07-24 ENCOUNTER — APPOINTMENT (OUTPATIENT)
Dept: GENERAL RADIOLOGY | Facility: CLINIC | Age: 55
DRG: 439 | End: 2025-07-24
Attending: INTERNAL MEDICINE
Payer: COMMERCIAL

## 2025-07-24 ENCOUNTER — ANESTHESIA EVENT (OUTPATIENT)
Dept: SURGERY | Facility: CLINIC | Age: 55
End: 2025-07-24
Payer: COMMERCIAL

## 2025-07-24 ENCOUNTER — ANESTHESIA (OUTPATIENT)
Dept: SURGERY | Facility: CLINIC | Age: 55
End: 2025-07-24
Payer: COMMERCIAL

## 2025-07-24 VITALS
SYSTOLIC BLOOD PRESSURE: 146 MMHG | TEMPERATURE: 97.7 F | WEIGHT: 161.38 LBS | DIASTOLIC BLOOD PRESSURE: 88 MMHG | RESPIRATION RATE: 20 BRPM | OXYGEN SATURATION: 99 % | HEART RATE: 80 BPM | BODY MASS INDEX: 28.1 KG/M2

## 2025-07-24 LAB
ALBUMIN SERPL BCG-MCNC: 3.9 G/DL (ref 3.5–5.2)
ALP SERPL-CCNC: 63 U/L (ref 40–150)
ALT SERPL W P-5'-P-CCNC: 21 U/L (ref 0–50)
ANION GAP SERPL CALCULATED.3IONS-SCNC: 13 MMOL/L (ref 7–15)
AST SERPL W P-5'-P-CCNC: 28 U/L (ref 0–45)
BASOPHILS # BLD AUTO: 0.2 10E3/UL (ref 0–0.2)
BASOPHILS NFR BLD AUTO: 2 %
BILIRUB DIRECT SERPL-MCNC: <0.08 MG/DL (ref 0–0.3)
BILIRUB SERPL-MCNC: 0.2 MG/DL
BUN SERPL-MCNC: 4.5 MG/DL (ref 6–20)
CALCIUM SERPL-MCNC: 9.1 MG/DL (ref 8.8–10.4)
CHLORIDE SERPL-SCNC: 102 MMOL/L (ref 98–107)
CREAT SERPL-MCNC: 0.76 MG/DL (ref 0.51–0.95)
EGFRCR SERPLBLD CKD-EPI 2021: >90 ML/MIN/1.73M2
EOSINOPHIL # BLD AUTO: 0.9 10E3/UL (ref 0–0.7)
EOSINOPHIL NFR BLD AUTO: 9 %
ERYTHROCYTE [DISTWIDTH] IN BLOOD BY AUTOMATED COUNT: 13.1 % (ref 10–15)
GLUCOSE SERPL-MCNC: 115 MG/DL (ref 70–99)
HCO3 SERPL-SCNC: 22 MMOL/L (ref 22–29)
HCT VFR BLD AUTO: 43.5 % (ref 35–47)
HGB BLD-MCNC: 14.5 G/DL (ref 11.7–15.7)
IMM GRANULOCYTES # BLD: 0 10E3/UL
IMM GRANULOCYTES NFR BLD: 0 %
LIPASE SERPL-CCNC: 205 U/L (ref 13–60)
LYMPHOCYTES # BLD AUTO: 4 10E3/UL (ref 0.8–5.3)
LYMPHOCYTES NFR BLD AUTO: 40 %
MCH RBC QN AUTO: 27.8 PG (ref 26.5–33)
MCHC RBC AUTO-ENTMCNC: 33.3 G/DL (ref 31.5–36.5)
MCV RBC AUTO: 83 FL (ref 78–100)
MONOCYTES # BLD AUTO: 0.7 10E3/UL (ref 0–1.3)
MONOCYTES NFR BLD AUTO: 7 %
NEUTROPHILS # BLD AUTO: 4.2 10E3/UL (ref 1.6–8.3)
NEUTROPHILS NFR BLD AUTO: 42 %
NRBC # BLD AUTO: 0 10E3/UL
NRBC BLD AUTO-RTO: 0 /100
PLATELET # BLD AUTO: 359 10E3/UL (ref 150–450)
POTASSIUM SERPL-SCNC: 3.8 MMOL/L (ref 3.4–5.3)
PROT SERPL-MCNC: 6.6 G/DL (ref 6.4–8.3)
RBC # BLD AUTO: 5.22 10E6/UL (ref 3.8–5.2)
SODIUM SERPL-SCNC: 137 MMOL/L (ref 135–145)
UPPER EUS: NORMAL
WBC # BLD AUTO: 10 10E3/UL (ref 4–11)

## 2025-07-24 PROCEDURE — 250N000009 HC RX 250

## 2025-07-24 PROCEDURE — 258N000003 HC RX IP 258 OP 636: Performed by: HOSPITALIST

## 2025-07-24 PROCEDURE — 250N000013 HC RX MED GY IP 250 OP 250 PS 637: Performed by: INTERNAL MEDICINE

## 2025-07-24 PROCEDURE — 0DJ08ZZ INSPECTION OF UPPER INTESTINAL TRACT, VIA NATURAL OR ARTIFICIAL OPENING ENDOSCOPIC: ICD-10-PCS | Performed by: INTERNAL MEDICINE

## 2025-07-24 PROCEDURE — 250N000013 HC RX MED GY IP 250 OP 250 PS 637: Performed by: HOSPITALIST

## 2025-07-24 PROCEDURE — 74330 X-RAY BILE/PANC ENDOSCOPY: CPT

## 2025-07-24 PROCEDURE — 85025 COMPLETE CBC W/AUTO DIFF WBC: CPT | Performed by: HOSPITALIST

## 2025-07-24 PROCEDURE — 710N000009 HC RECOVERY PHASE 1, LEVEL 1, PER MIN: Performed by: INTERNAL MEDICINE

## 2025-07-24 PROCEDURE — 999N000141 HC STATISTIC PRE-PROCEDURE NURSING ASSESSMENT: Performed by: INTERNAL MEDICINE

## 2025-07-24 PROCEDURE — 83690 ASSAY OF LIPASE: CPT | Performed by: HOSPITALIST

## 2025-07-24 PROCEDURE — 82248 BILIRUBIN DIRECT: CPT | Performed by: INTERNAL MEDICINE

## 2025-07-24 PROCEDURE — 258N000003 HC RX IP 258 OP 636: Performed by: SURGERY

## 2025-07-24 PROCEDURE — 250N000011 HC RX IP 250 OP 636: Performed by: INTERNAL MEDICINE

## 2025-07-24 PROCEDURE — 360N000083 HC SURGERY LEVEL 3 W/ FLUORO, PER MIN: Performed by: INTERNAL MEDICINE

## 2025-07-24 PROCEDURE — C1769 GUIDE WIRE: HCPCS | Performed by: INTERNAL MEDICINE

## 2025-07-24 PROCEDURE — 99232 SBSQ HOSP IP/OBS MODERATE 35: CPT | Performed by: HOSPITALIST

## 2025-07-24 PROCEDURE — 258N000003 HC RX IP 258 OP 636

## 2025-07-24 PROCEDURE — 120N000001 HC R&B MED SURG/OB

## 2025-07-24 PROCEDURE — 250N000009 HC RX 250: Performed by: INTERNAL MEDICINE

## 2025-07-24 PROCEDURE — 82374 ASSAY BLOOD CARBON DIOXIDE: CPT | Performed by: HOSPITALIST

## 2025-07-24 PROCEDURE — 370N000017 HC ANESTHESIA TECHNICAL FEE, PER MIN: Performed by: INTERNAL MEDICINE

## 2025-07-24 PROCEDURE — 258N000003 HC RX IP 258 OP 636: Performed by: ANESTHESIOLOGY

## 2025-07-24 PROCEDURE — 250N000011 HC RX IP 250 OP 636

## 2025-07-24 PROCEDURE — 272N000001 HC OR GENERAL SUPPLY STERILE: Performed by: INTERNAL MEDICINE

## 2025-07-24 PROCEDURE — 36415 COLL VENOUS BLD VENIPUNCTURE: CPT | Performed by: HOSPITALIST

## 2025-07-24 RX ORDER — ONDANSETRON 4 MG/1
4 TABLET, ORALLY DISINTEGRATING ORAL EVERY 30 MIN PRN
Status: DISCONTINUED | OUTPATIENT
Start: 2025-07-24 | End: 2025-07-24

## 2025-07-24 RX ORDER — SUCRALFATE ORAL 1 G/10ML
1 SUSPENSION ORAL
Status: DISCONTINUED | OUTPATIENT
Start: 2025-07-24 | End: 2025-07-24

## 2025-07-24 RX ORDER — FLUMAZENIL 0.1 MG/ML
0.2 INJECTION, SOLUTION INTRAVENOUS
Status: DISCONTINUED | OUTPATIENT
Start: 2025-07-24 | End: 2025-07-24

## 2025-07-24 RX ORDER — LIDOCAINE 4 G/G
1 PATCH TOPICAL
Status: DISCONTINUED | OUTPATIENT
Start: 2025-07-24 | End: 2025-07-25 | Stop reason: HOSPADM

## 2025-07-24 RX ORDER — MAGNESIUM HYDROXIDE 1200 MG/15ML
LIQUID ORAL PRN
Status: DISCONTINUED | OUTPATIENT
Start: 2025-07-24 | End: 2025-07-24 | Stop reason: HOSPADM

## 2025-07-24 RX ORDER — ONDANSETRON 2 MG/ML
4 INJECTION INTRAMUSCULAR; INTRAVENOUS EVERY 30 MIN PRN
Status: DISCONTINUED | OUTPATIENT
Start: 2025-07-24 | End: 2025-07-24

## 2025-07-24 RX ORDER — PROPOFOL 10 MG/ML
INJECTION, EMULSION INTRAVENOUS CONTINUOUS PRN
Status: DISCONTINUED | OUTPATIENT
Start: 2025-07-24 | End: 2025-07-24

## 2025-07-24 RX ORDER — METHOCARBAMOL 500 MG/1
500 TABLET, FILM COATED ORAL 3 TIMES DAILY PRN
Status: DISCONTINUED | OUTPATIENT
Start: 2025-07-24 | End: 2025-07-25 | Stop reason: HOSPADM

## 2025-07-24 RX ORDER — ONDANSETRON 2 MG/ML
INJECTION INTRAMUSCULAR; INTRAVENOUS PRN
Status: DISCONTINUED | OUTPATIENT
Start: 2025-07-24 | End: 2025-07-24

## 2025-07-24 RX ORDER — SODIUM CHLORIDE, SODIUM LACTATE, POTASSIUM CHLORIDE, CALCIUM CHLORIDE 600; 310; 30; 20 MG/100ML; MG/100ML; MG/100ML; MG/100ML
INJECTION, SOLUTION INTRAVENOUS CONTINUOUS PRN
Status: DISCONTINUED | OUTPATIENT
Start: 2025-07-24 | End: 2025-07-24

## 2025-07-24 RX ORDER — NALOXONE HYDROCHLORIDE 0.4 MG/ML
0.1 INJECTION, SOLUTION INTRAMUSCULAR; INTRAVENOUS; SUBCUTANEOUS
Status: DISCONTINUED | OUTPATIENT
Start: 2025-07-24 | End: 2025-07-24

## 2025-07-24 RX ORDER — HYDRALAZINE HYDROCHLORIDE 20 MG/ML
2.5-5 INJECTION INTRAMUSCULAR; INTRAVENOUS EVERY 10 MIN PRN
Status: DISCONTINUED | OUTPATIENT
Start: 2025-07-24 | End: 2025-07-25 | Stop reason: HOSPADM

## 2025-07-24 RX ORDER — SODIUM CHLORIDE, SODIUM LACTATE, POTASSIUM CHLORIDE, CALCIUM CHLORIDE 600; 310; 30; 20 MG/100ML; MG/100ML; MG/100ML; MG/100ML
INJECTION, SOLUTION INTRAVENOUS CONTINUOUS
Status: DISCONTINUED | OUTPATIENT
Start: 2025-07-24 | End: 2025-07-25 | Stop reason: HOSPADM

## 2025-07-24 RX ORDER — DEXAMETHASONE SODIUM PHOSPHATE 4 MG/ML
4 INJECTION, SOLUTION INTRA-ARTICULAR; INTRALESIONAL; INTRAMUSCULAR; INTRAVENOUS; SOFT TISSUE
Status: DISCONTINUED | OUTPATIENT
Start: 2025-07-24 | End: 2025-07-24

## 2025-07-24 RX ORDER — SUCRALFATE 1 G/1
1 TABLET ORAL
Status: DISCONTINUED | OUTPATIENT
Start: 2025-07-24 | End: 2025-07-25 | Stop reason: HOSPADM

## 2025-07-24 RX ORDER — HYDROXYZINE HYDROCHLORIDE 25 MG/1
25 TABLET, FILM COATED ORAL EVERY 6 HOURS PRN
Status: DISCONTINUED | OUTPATIENT
Start: 2025-07-24 | End: 2025-07-24

## 2025-07-24 RX ORDER — LABETALOL HYDROCHLORIDE 5 MG/ML
10 INJECTION, SOLUTION INTRAVENOUS
Status: DISCONTINUED | OUTPATIENT
Start: 2025-07-24 | End: 2025-07-25 | Stop reason: HOSPADM

## 2025-07-24 RX ORDER — SODIUM CHLORIDE, SODIUM LACTATE, POTASSIUM CHLORIDE, CALCIUM CHLORIDE 600; 310; 30; 20 MG/100ML; MG/100ML; MG/100ML; MG/100ML
INJECTION, SOLUTION INTRAVENOUS CONTINUOUS
Status: DISCONTINUED | OUTPATIENT
Start: 2025-07-24 | End: 2025-07-24 | Stop reason: HOSPADM

## 2025-07-24 RX ORDER — PROCHLORPERAZINE MALEATE 10 MG
10 TABLET ORAL EVERY 6 HOURS PRN
Status: DISCONTINUED | OUTPATIENT
Start: 2025-07-24 | End: 2025-07-25 | Stop reason: HOSPADM

## 2025-07-24 RX ADMIN — SODIUM CHLORIDE, SODIUM LACTATE, POTASSIUM CHLORIDE, AND CALCIUM CHLORIDE: .6; .31; .03; .02 INJECTION, SOLUTION INTRAVENOUS at 10:46

## 2025-07-24 RX ADMIN — DEXMEDETOMIDINE HYDROCHLORIDE 12 MCG: 100 INJECTION, SOLUTION INTRAVENOUS at 11:39

## 2025-07-24 RX ADMIN — ONDANSETRON 4 MG: 2 INJECTION INTRAMUSCULAR; INTRAVENOUS at 12:02

## 2025-07-24 RX ADMIN — DICYCLOMINE HYDROCHLORIDE 10 MG: 10 CAPSULE ORAL at 16:05

## 2025-07-24 RX ADMIN — ESTRADIOL 1 MG: 1 TABLET ORAL at 08:15

## 2025-07-24 RX ADMIN — QUETIAPINE FUMARATE 50 MG: 25 TABLET ORAL at 21:21

## 2025-07-24 RX ADMIN — SODIUM CHLORIDE: 0.9 INJECTION, SOLUTION INTRAVENOUS at 00:43

## 2025-07-24 RX ADMIN — OXYCODONE HYDROCHLORIDE 5 MG: 5 TABLET ORAL at 03:38

## 2025-07-24 RX ADMIN — CLONIDINE HYDROCHLORIDE 0.2 MG: 0.1 TABLET ORAL at 21:20

## 2025-07-24 RX ADMIN — ONDANSETRON 4 MG: 2 INJECTION, SOLUTION INTRAMUSCULAR; INTRAVENOUS at 00:52

## 2025-07-24 RX ADMIN — DICYCLOMINE HYDROCHLORIDE 10 MG: 10 CAPSULE ORAL at 06:45

## 2025-07-24 RX ADMIN — LINACLOTIDE 72 MCG: 72 CAPSULE, GELATIN COATED ORAL at 06:45

## 2025-07-24 RX ADMIN — MIDAZOLAM 2 MG: 1 INJECTION INTRAMUSCULAR; INTRAVENOUS at 12:00

## 2025-07-24 RX ADMIN — ROSUVASTATIN CALCIUM 5 MG: 5 TABLET, FILM COATED ORAL at 21:20

## 2025-07-24 RX ADMIN — HYDROXYZINE HYDROCHLORIDE 50 MG: 25 TABLET, FILM COATED ORAL at 08:15

## 2025-07-24 RX ADMIN — OXYCODONE HYDROCHLORIDE 5 MG: 5 TABLET ORAL at 18:53

## 2025-07-24 RX ADMIN — CLONIDINE HYDROCHLORIDE 0.2 MG: 0.1 TABLET ORAL at 08:15

## 2025-07-24 RX ADMIN — METHOCARBAMOL 750 MG: 750 TABLET ORAL at 21:20

## 2025-07-24 RX ADMIN — PROPOFOL 135 MCG/KG/MIN: 10 INJECTION, EMULSION INTRAVENOUS at 11:38

## 2025-07-24 RX ADMIN — SODIUM CHLORIDE, SODIUM LACTATE, POTASSIUM CHLORIDE, AND CALCIUM CHLORIDE: .6; .31; .03; .02 INJECTION, SOLUTION INTRAVENOUS at 18:56

## 2025-07-24 RX ADMIN — MINOXIDIL 1.25 MG: 2.5 TABLET ORAL at 08:15

## 2025-07-24 RX ADMIN — BACLOFEN 5 MG: 5 TABLET ORAL at 08:15

## 2025-07-24 RX ADMIN — SODIUM CHLORIDE, POTASSIUM CHLORIDE, SODIUM LACTATE AND CALCIUM CHLORIDE: 600; 310; 30; 20 INJECTION, SOLUTION INTRAVENOUS at 11:36

## 2025-07-24 RX ADMIN — LIDOCAINE 1 PATCH: 4 PATCH TOPICAL at 19:49

## 2025-07-24 RX ADMIN — OXYCODONE HYDROCHLORIDE 5 MG: 5 TABLET ORAL at 13:42

## 2025-07-24 ASSESSMENT — ACTIVITIES OF DAILY LIVING (ADL)
ADLS_ACUITY_SCORE: 34

## 2025-07-24 NOTE — PLAN OF CARE
Date/Time: 7/24/2025 0700 - 1500  Diagnosis: Abdominal pain  Mental Status: A&O x4  Activity/dangle: Independent in room  Diet: Clear liquid diet  Pain: Managed with PRN Atarax and Oxycodone  Dent/Voiding: Voiding in the bathroom  Tele/Restraints/Iso: N/A  02/LDA: Room air. LR infusing at 100 mL/hr  D/C Date: Possible discharge 7/25/25  Other Info: Esophagogastroduodenoscopy, w/ endoscopic ultrasound guidance ERCP,  Complex done today.

## 2025-07-24 NOTE — PROVIDER NOTIFICATION
MD Notification    Notified Person: MD    Notified Person Name: Ina Hand    Notification Date/Time: 07/24/2025 2270    Notification Interaction: [employer] messaging    Purpose of Notification: [address] Pt refused BG check and insulin. Thank you    Orders Received:    Comments:

## 2025-07-24 NOTE — ANESTHESIA CARE TRANSFER NOTE
Patient: Natty Bateman    Procedure: Procedure(s):  ESOPHAGOGASTRODUODENOSCOPY, WITH ENDOSCOPIC ULTRASOUND GUIDANCE  ENDOSCOPIC RETROGRADE CHOLANGIOPANCREATOGRAPHY, COMPLEX       Diagnosis: Acute biliary pancreatitis without infection or necrosis [K85.10]  Diagnosis Additional Information: No value filed.    Anesthesia Type:   MAC     Note:    Oropharynx: oropharynx clear of all foreign objects  Level of Consciousness: awake  Oxygen Supplementation: face mask  Level of Supplemental Oxygen (L/min / FiO2): 6  Independent Airway: airway patency satisfactory and stable  Dentition: dentition unchanged  Vital Signs Stable: post-procedure vital signs reviewed and stable  Report to RN Given: handoff report given  Patient transferred to: Phase II    Handoff Report: Identifed the Patient, Identified the Reponsible Provider, Reviewed the pertinent medical history, Discussed the surgical course, Reviewed Intra-OP anesthesia mangement and issues during anesthesia, Set expectations for post-procedure period and Allowed opportunity for questions and acknowledgement of understanding  Vitals:  Vitals Value Taken Time   BP     Temp     Pulse     Resp     SpO2         Electronically Signed By: CHAU Mack CRNA  July 24, 2025  12:13 PM

## 2025-07-24 NOTE — ANESTHESIA PREPROCEDURE EVALUATION
Anesthesia Pre-Procedure Evaluation    Patient: Natty Bateman   MRN: [id number] : 1970          Procedure : Procedure(s):  ESOPHAGOGASTRODUODENOSCOPY, WITH ENDOSCOPIC ULTRASOUND GUIDANCE  ENDOSCOPIC RETROGRADE CHOLANGIOPANCREATOGRAPHY, COMPLEX         Past Medical History:   Diagnosis Date    Abnormal glucose     ddd     cervical    Depression     Insomnia     Leiomyoma of uterus, unspecified     Migraine headaches     Obesity 2009    Overweight(278.02)       Past Surgical History:   Procedure Laterality Date    ENDOSCOPIC RETROGRADE CHOLANGIOPANCREATOGRAM COMPLEX N/A 2025    Procedure: ENDOSCOPIC RETROGRADE CHOLANGIOPANCREATOGRAPHY, COMPLEX;  Surgeon: Alberto Forman MD;  Location: [facility]    HC REPAIR ROTATOR CUFF,ACUTE  -    TONSILLECTOMY  age 33    TUBAL LIGATION  age 24    ZZC TOTAL ABDOM HYSTERECTOMY  -    has ovaries, myomatous uterus      Allergies   Allergen Reactions    Adhesive Tape Dermatitis     Sensitivity to band-aids, NOT dermabond or liquid adhesives.    Depakene [Valproic Acid] Other (See Comments)    Fentanyl Itching     Patient states she had full body itching when she woke up from surgery and believes it was Fentanyl that caused the itching. This was reported in a follow up phone call.     Geodon [Ziprasidone] Other (See Comments)    Tegaderm Transparent Dressing (Informational Only) Dermatitis    Vortioxetine Itching    Dilaudid [Hydromorphone] Itching    Oxycodone Itching      Social History     Tobacco Use    Smoking status: Never    Smokeless tobacco: Never   Substance Use Topics    Alcohol use: Yes     Comment: minimal      Wt Readings from Last 1 Encounters:   25 73.2 kg (161 lb 6 oz)        Anesthesia Evaluation   Pt has had prior anesthetic.         ROS/MED HX  ENT/Pulmonary:  - neg pulmonary ROS  (-) sleep apnea   Neurologic:     (+)      migraines,                          Cardiovascular:     (+) Dyslipidemia hypertension- -   -  - -            "                      Previous cardiac testing   Echo: Date: Results:    Stress Test:  Date: 6/3/24 Results:  Negative for ischemia  ECG Reviewed:  Date: Results:    Cath:  Date: Results:      METS/Exercise Tolerance:     Hematologic:  - neg hematologic  ROS     Musculoskeletal:  - neg musculoskeletal ROS     GI/Hepatic: Comment: Cholangitis/Choledocholithiasis    (+) GERD,         cholecystitis/cholelithiasis,          Renal/Genitourinary: Comment: Urinary retention - bladder stimulator in place      Endo:     (+)  type II DM,             Obesity,       Psychiatric/Substance Use:     (+) psychiatric history bipolar       Infectious Disease:       Malignancy:       Other:      (+)  , H/O Chronic Pain,           Physical Exam  Airway  Mallampati: II  TM distance: >3 FB  Neck ROM: full  Mouth opening: >= 4 cm    Cardiovascular - normal exam   Dental   (+) Minor Abnormalities - some fillings, tiny chips      Pulmonary - normal exam      Neurological - normal exam  She appears awake, alert and oriented x3.    Other Findings       OUTSIDE LABS:  CBC:   Lab Results   Component Value Date    WBC 10.0 07/24/2025    WBC 8.1 07/23/2025    HGB 14.5 07/24/2025    HGB 12.1 07/23/2025    HCT 43.5 07/24/2025    HCT 35.4 07/23/2025     07/24/2025     07/23/2025     BMP:   Lab Results   Component Value Date     07/24/2025     07/23/2025    POTASSIUM 3.8 07/24/2025    POTASSIUM 3.7 07/23/2025    CHLORIDE 102 07/24/2025    CHLORIDE 105 07/23/2025    CO2 22 07/24/2025    CO2 24 07/23/2025    BUN 4.5 (L) 07/24/2025    BUN 6.3 07/23/2025    CR 0.76 07/24/2025    CR 0.67 07/23/2025     (H) 07/24/2025    GLC 84 07/23/2025     COAGS: No results found for: \"PTT\", \"INR\", \"FIBR\"  POC:   Lab Results   Component Value Date     (H) 06/23/2011    HCG Negative 09/02/2010     HEPATIC:   Lab Results   Component Value Date    ALBUMIN 3.9 07/24/2025    PROTTOTAL 6.6 07/24/2025    ALT 21 07/24/2025    AST 28 " 07/24/2025    ALKPHOS 63 07/24/2025    BILITOTAL 0.2 07/24/2025     OTHER:   Lab Results   Component Value Date    LACT 1.5 06/09/2025    A1C 6.2 (H) 06/09/2025    SYED 9.1 07/24/2025    LIPASE 205 (H) 07/24/2025    TSH 4.82 02/10/2010       Anesthesia Plan    ASA Status:  3      NPO Status: NPO Appropriate   Anesthesia Type: MAC.  Airway: natural airway.   Techniques and Equipment:       - Monitoring Plan: standard ASA monitoring     Consents    Anesthesia Plan(s) and associated risks, benefits, and realistic alternatives discussed. Questions answered and patient/representative(s) expressed understanding.     - Discussed: CRNA     - Discussed with:  Patient               Postoperative Care    Pain management: multimodal analgesia.     Comments:                   Amaury Fuentes MD    I have reviewed the pertinent notes and labs in the chart from the past 30 days and (re)examined the patient.  Any updates or changes from those notes are reflected in this note.    Clinically Significant Risk Factors           # Hypocalcemia: Lowest Ca = 8.4 mg/dL in last 2 days, will monitor and replace as appropriate     # Hypoalbuminemia: Lowest albumin = 3.3 g/dL at 7/23/2025  8:41 AM, will monitor as appropriate                 # Moderate Malnutrition: based on nutrition assessment and treatment provided per dietitian's recommendations., PRESENT ON ADMISSION

## 2025-07-24 NOTE — ANESTHESIA POSTPROCEDURE EVALUATION
Patient: Natty Bateman    Procedure: Procedure(s):  ESOPHAGOGASTRODUODENOSCOPY, WITH ENDOSCOPIC ULTRASOUND GUIDANCE  ENDOSCOPIC RETROGRADE CHOLANGIOPANCREATOGRAPHY, COMPLEX       Anesthesia Type:  MAC    Note:  Disposition: Outpatient   Postop Pain Control: Uneventful            Sign Out: Well controlled pain   PONV: No   Neuro/Psych: Uneventful            Sign Out: Acceptable/Baseline neuro status   Airway/Respiratory: Uneventful            Sign Out: Acceptable/Baseline resp. status   CV/Hemodynamics: Uneventful            Sign Out: Acceptable CV status   Other NRE: NONE   DID A NON-ROUTINE EVENT OCCUR? No           Last vitals:  Vitals Value Taken Time   /75 07/24/25 12:18   Temp 35.4  C (95.72  F) 07/24/25 12:20   Pulse 76 07/24/25 12:21   Resp 13 07/24/25 12:21   SpO2 100 % 07/24/25 12:21   Vitals shown include unfiled device data.    Electronically Signed By: Amaury Fuentes MD  July 24, 2025  12:21 PM

## 2025-07-24 NOTE — PROGRESS NOTES
[facility]    Medicine Progress Note - Hospitalist Service    Date of Admission:  7/23/2025    Assessment & Plan     Natty Bateman is a 54 year old female admitted on 7/23/2025. She Natty Bateman is a 54 year old female with past medical history of hypertension, dyslipidemia, diabetes mellitus type 2, recent admission for ascending colon enteritis with sepsis status post ERCP with stent placement, Klebsiella bacteremia, IBS, migraine, bipolar disorder, GERD, chronic pain-presented initially to outside ER for evaluation of abdominal pain.     As mentioned above the patient was admitted at [facility] from 6/8-6/12/2025 with abdominal pain; CT abdomen and pelvis showed a dilated gallbladder with new intra and extrahepatic biliary ductal dilation and evidence of choledocholithiasis.  She seemed to have sepsis associated with ascending cholangitis.  She underwent ERCP (Dr. Forman) with biliary sphincterotomy and temporary stent placed.  She also underwent laparoscopic cholecystectomy by surgery.  Blood cultures from outside hospital were positive for bacteremia with Klebsiella pneumonia; she was treated with cefepime and Flagyl in the hospital and discharged on Augmentin     # Acute bilary gastritis  # History of gallstones/choledocholithiasis status post ERCP with stent placement on 6/9/2025 and status post laparoscopic cholecystectomy on 6/10/2025  * Presented with epigastric/upper abdomen pain that started yesterday morning associated with some nausea but no vomiting.   * Denies alcohol drinking  * No fever, no leukocytosis  *CT abdomen pelvis noted presence of plastic stent in the CBD, slightly increased size of CBD 1.7 cm which was 1.4 cm before.  Tiny pleural effusion and nonobstructing left kidney stone as well noted.      -Lipase elevated to 1144 but CT without pancreatitis as above; LFTs in ER are within normal limits, no elevated bilirubin.  WBC normal.  -Kept NPO,  subsequently with clear liquid, GI consulted, s/p EUS.  Noted markedly inflamed stomach lining, consistent with biliary gastritis.  No ulcer or active bleeding.  Stent in place, CBD sludge removed.  - Continue with PPI.  Clear liquid diet, advance as tolerated per GI.  Carafate.  - Avoid NSAIDs.     -Pain management-Tylenol as needed, oxycodone. Did receive IV Dilaudid as needed (she states that she cannot tolerate oxy and Dilaudid, cannot tolerate morphine), reported itching/hives on face.  Not noted.  Did get Benadryl and tolerated it.     # Diabetes mellitus type 2  -PTA on Mounjaro and metformin-hold it for now  -HbA1c was 6.2 on 6/9/2025  -ISS  -Hypoglycemia protocol     # IBS  # GERD  # Bipolar disorder    # Hypertension  # Dyslipidemia  # Chronic pain syndrome  # migraine   - Resume PTA meds    # Moderate Protein-Calorie Malnutrition   - based on RD assessment, nutrition consulted and supplements recommended         Diet: Clear Liquid Diet    DVT Prophylaxis: Pneumatic Compression Devices  Dent Catheter: Not present  Lines: None     Cardiac Monitoring: ACTIVE order. Indication: Procedural area  Code Status: Full Code      Clinically Significant Risk Factors           # Hypocalcemia: Lowest Ca = 8.4 mg/dL in last 2 days, will monitor and replace as appropriate     # Hypoalbuminemia: Lowest albumin = 3.3 g/dL at 7/23/2025  8:41 AM, will monitor as appropriate                              Disposition Plan     Medically Ready for Discharge: Anticipated Tomorrow tolerates advanced diet.             Ina Hand MD  Hospitalist Service  [facility]  Securely message with [employer] (more info)  Text page via [facility] Paging/Directory   ______________________________________________________________________    Interval History     Chart reviewed and patient was seen this afternoon after she had EUS.  Reports ongoing upper abdominal pain.  No nausea.  No hematochezia or melena.  - US findings  discussed with Dr. Forman.  Noted markedly biliary gastritis.  No ulcer or bleeding.  Noted sludge in the CBD that was removed.    Physical Exam   Vital Signs: Temp: 97.3  F (36.3  C) Temp src: Oral BP: (!) 153/91 Pulse: 71   Resp: 20 SpO2: 99 % O2 Device: None (Room air)    Weight: 161 lbs 6.03 oz    General: AAOx3, pleasant, appears comfortable.  HEENT: PERRLA EOMI. Mucosa moist.   Lungs: Bilateral equal air entry. Clear to auscultation, normal work of breathing.   CVS: S1S2 regular, no tachycardia or murmur.   Abdomen: Soft, nondistended, tender epigastric and upper quadrants.  No guarding or rigidity.  BS heard.  MSK: No edema or deformities.  Neuro: AAOX3. CN 2-12 normal. Strength symmetrical.  Skin: No rash.       Medical Decision Making       45 MINUTES SPENT BY ME on the date of service doing chart review, history, exam, documentation & further activities per the note.      Data     I have personally reviewed the following data over the past 24 hrs:    10.0  \   14.5   / 359     137 102 4.5 (L) /  115 (H)   3.8 22 0.76 \     ALT: 21 AST: 28 AP: 63 TBILI: 0.2   ALB: 3.9 TOT PROTEIN: 6.6 LIPASE: 205 (H)       Imaging results reviewed over the past 24 hrs:   Recent Results (from the past 24 hours)   XR ERCP    Narrative    This exam was marked as non-reportable because it will not be read by a   radiologist or a [facility] non-radiologist provider.

## 2025-07-24 NOTE — PROGRESS NOTES
[facility]  Gastroenterology Progress Note     Natty Bateman MRN# [id number]   YOB: 1970 Age: 54 year old          Assessment and Plan:   Natty Bateman is a 54 year old female admitted on 7/23/2025. She Natty Bateman is a 54 year old female with past medical history of hypertension, dyslipidemia, diabetes mellitus type 2, recent admission for ascending colon enteritis with sepsis status post ERCP with stent placement, Klebsiella bacteremia, IBS, migraine, bipolar disorder, GERD, chronic pain-presented initially 32-12 ER for evaluation of abdominal pain.      Pancreatitis  Abdominal pain  S/p ERCP for cholangitis on 6/9/25  Presented with epigastric/upper abdomen pain started yesterday morning associated with some nausea but no vomiting. Pain located primarily on left and left flank- unusual presentation- pain more associated with musculoskeletal pain  Denies alcohol use, no viral illness, no fever, chills or rigors  Lipase 1100, LFTs normal  CT abdomen/pelvis done in ER:1.  Interval cholecystectomy. Plastic common bile duct stent has been placed with slight increase in the common bile duct dilatation now measuring 1.7 cm. Intrahepatic bile duct dilatation is slightly improved. There is new pneumobilia. There is some biliary enhancement now seen.   2.  Tiny pleural effusions.   3.  A few tiny nonobstructing stones left kidney.   Findings indicative of acute pancreatitis possibly related to sludge or stone or virus vs other vs Related to PUD     - EUS with ERCP and stent removal today  - start clear liquid diet  - continue supportive cares with prn pain control and antiemetics. As well as IVF  - NPO    - Check CRP, daily CBC, CMP          Interval History:     C/o migraine overnight.                Review of Systems:     C: NEGATIVE for fever, chills, change in weight  E/M: NEGATIVE for ear, mouth and throat problems  R: NEGATIVE for significant cough or SOB  CV: NEGATIVE for  chest pain, palpitations or peripheral edema             Medications:   I have reviewed this patient's current medications  Current Facility-Administered Medications   Medication Dose Route Frequency Provider Last Rate Last Admin    [Held by provider] aspirin (ASA) chewable tablet 81 mg  81 mg Oral Daily Ina Hand MD        Atogepant (QULIPTA) tablet 60 mg  60 mg Oral Daily Ina Hand MD        calcium polycarbophil (FIBERCON) tablet 625 mg  625 mg Oral QPM Ina Hand MD        cloNIDine (CATAPRES) tablet 0.2 mg  0.2 mg Oral BID Ina Hand MD   0.2 mg at 07/24/25 0815    dicyclomine (BENTYL) capsule 10 mg  10 mg Oral TID AC Ina Hand MD   10 mg at 07/24/25 0645    estradiol (ESTRACE) tablet 1 mg  1 mg Oral Daily Ina Hand MD   1 mg at 07/24/25 0815    insulin aspart (NovoLOG) injection (RAPID ACTING)  1-7 Units Subcutaneous Q4H Juani Archibald MD        Lidocaine (LIDOCARE) 4 % Patch 1 patch  1 patch Transdermal Q24H Ina Hand MD        linaclotide (LINZESS) capsule 72 mcg  72 mcg Oral QAM AC Ina Hand MD   72 mcg at 07/24/25 0645    methocarbamol (ROBAXIN) tablet 750 mg  750 mg Oral At Bedtime Ina Hand MD   750 mg at 07/23/25 2119    minoxidil (LONITEN) half-tab 1.25 mg  1.25 mg Oral Daily Ina Hand MD   1.25 mg at 07/24/25 0815    pantoprazole (PROTONIX) injection 40 mg  40 mg Intravenous BID AC Ina Hand MD        QUEtiapine (SEROquel) tablet 50 mg  50 mg Oral At Bedtime Ina Hand MD   50 mg at 07/23/25 2119    rosuvastatin (CRESTOR) tablet 5 mg  5 mg Oral At Bedtime Ina Hand MD   5 mg at 07/23/25 2119    sodium chloride (PF) 0.9% PF flush 3 mL  3 mL Intracatheter Q8H [facility] Juani Archibald MD   3 mL at 07/23/25 2120    vilazodone (VIIBRYD) tablet 40 mg  40 mg Oral Ina Hills MD   40 mg at 07/23/25 1802                  Physical Exam:   Vitals were reviewed  Vital Signs with Ranges  Temp:   [97.4  F (36.3  C)-98.5  F (36.9  C)] 97.6  F (36.4  C)  Pulse:  [] 76  Resp:  [18-20] 20  BP: (123-174)/(55-98) 138/83  SpO2:  [96 %-99 %] 99 %  I/O last 3 completed shifts:  In: 240 [P.O.:240]  Out: 550 [Urine:550]  Constitutional: Appears uncomfortable  HEENT: Sclera white, MMM  Respiratory: Breathing non-labored  Musculoskeletal: Normal muscle bulk and tone  Neuro: Alert and appropriate, FOUNTAIN           Data:   I reviewed the patient's new clinical lab test results.   Recent Labs   Lab Test 07/24/25  0711 07/23/25  0841 06/19/25  1149   WBC 10.0 8.1 9.9   HGB 14.5 12.1 11.5*   MCV 83 82 84    314 404     Recent Labs   Lab Test 07/24/25  0711 07/23/25  0841 06/12/25  0742 06/12/25  0056 06/11/25  1115   POTASSIUM 3.8 3.7 3.4   < > 3.3*   CHLORIDE 102 105  --   --  101   CO2 22 24  --   --  22   BUN 4.5* 6.3  --   --  4.1*   ANIONGAP 13 9  --   --  14    < > = values in this interval not displayed.     Recent Labs   Lab Test 07/24/25  0711 07/23/25  0841 06/19/25  1149 06/11/25  1115 06/10/25  0639   ALBUMIN 3.9 3.3* 4.0   < > 3.1*   BILITOTAL 0.2 0.2 0.3   < > 0.7   ALT 21 13 21   < > 120*   AST 28 17 21   < > 95*   LIPASE 205* 89*  --   --  165*    < > = values in this interval not displayed.       I reviewed the patient's new imaging results.    All laboratory data reviewed  All imaging studies reviewed by me.    Kenneth Abrams PA-C,  7/24/2025  [facility]  Office : [phone]  Cell: [phone] (Dr. Forman)

## 2025-07-24 NOTE — PLAN OF CARE
Date & Time: 1059-5716.  Summary: Here w/ Abdominal pain & pancreatitis.   Behavior & Aggression: can be frustrated at times d/t migraine headache that has been pretty consistent overnight.  Fall Risk: No.  Orientation: A&Ox4  ABNL VS/O2: VSS on RA, exp occasional HTN.  ABNL Labs: see chart.  Pain Management: C/o extreme migraine this shift- gave PRN Nurtec from own supply per orders; only had one dose left, with little relief. PRN Oxy. Pt refuses Tylenol - states it does nothing for her. Tried ice/heat packs. Had a shower to tried to help with no result.  Bowel/Bladder: Up frequently to void, no BM, passing little gas per pt.  IV/Drains: PIV infusing NS @ 100ml/hr.  Wounds/incisions: Old abdominal incisions are C/D/I.  Diet:NPO since midnight.  Number of times OUT OF BED this shift : Up IND.  Tests/Procedures: Plan for surgery in am.  Anticipated  DC Date: TBD.

## 2025-07-24 NOTE — PLAN OF CARE
Goal Outcome Evaluation:      Summary:  Acute pancreatitis, Abd pain    Date & Time: 7/23/25 9406-2490   Orientation: A&O x4    Activity Level: Ind    Fall Risk: No    Behavior & Aggression: Green   Pain Management: PRN oxycodone    ABNL VS/O2: VSS on RA   Tele: N/A   ABNL Lab/BG: Refused BS   Diet: Clear, NPO at midnight    Bowel/Bladder: Continent   Skin:  Abd scab from previous surgery.   Drains/Devices: PIV SL   Tests/Procedures: EGD tomorrow    Anticipated DC Date: [place]    Other Important Info:

## 2025-07-25 VITALS
RESPIRATION RATE: 18 BRPM | BODY MASS INDEX: 28.1 KG/M2 | WEIGHT: 161.38 LBS | SYSTOLIC BLOOD PRESSURE: 153 MMHG | OXYGEN SATURATION: 99 % | HEART RATE: 66 BPM | DIASTOLIC BLOOD PRESSURE: 89 MMHG | TEMPERATURE: 97.3 F

## 2025-07-25 PROCEDURE — 99239 HOSP IP/OBS DSCHRG MGMT >30: CPT | Performed by: HOSPITALIST

## 2025-07-25 PROCEDURE — 250N000013 HC RX MED GY IP 250 OP 250 PS 637: Performed by: INTERNAL MEDICINE

## 2025-07-25 PROCEDURE — 250N000013 HC RX MED GY IP 250 OP 250 PS 637: Performed by: HOSPITALIST

## 2025-07-25 RX ORDER — SUCRALFATE 1 G/1
1 TABLET ORAL
Qty: 56 TABLET | Refills: 0 | Status: SHIPPED | OUTPATIENT
Start: 2025-07-25

## 2025-07-25 RX ADMIN — BACLOFEN 5 MG: 5 TABLET ORAL at 04:35

## 2025-07-25 RX ADMIN — HYDROXYZINE HYDROCHLORIDE 50 MG: 25 TABLET, FILM COATED ORAL at 02:45

## 2025-07-25 RX ADMIN — OXYCODONE HYDROCHLORIDE 5 MG: 5 TABLET ORAL at 02:45

## 2025-07-25 RX ADMIN — DIPHENHYDRAMINE HYDROCHLORIDE 50 MG: 25 CAPSULE ORAL at 05:54

## 2025-07-25 RX ADMIN — LINACLOTIDE 72 MCG: 72 CAPSULE, GELATIN COATED ORAL at 06:11

## 2025-07-25 RX ADMIN — DICYCLOMINE HYDROCHLORIDE 10 MG: 10 CAPSULE ORAL at 06:11

## 2025-07-25 RX ADMIN — OXYCODONE HYDROCHLORIDE 5 MG: 5 TABLET ORAL at 10:14

## 2025-07-25 ASSESSMENT — ACTIVITIES OF DAILY LIVING (ADL)
ADLS_ACUITY_SCORE: 34

## 2025-07-25 NOTE — PLAN OF CARE
Date & Time: 2500-3352.  Summary: Here w/ Abdominal pain & pancreatitis. POD 1 from a ERCP/EUS.  Behavior & Aggression: Green.  Fall Risk: No.  Orientation: A&Ox4  ABNL VS/O2: VSS on RA, exp occasional HTN.  ABNL Labs: see chart.  Pain Management:  PRN Oxy.  Bowel/Bladder: Up frequently to void, no BM, passing little gas per pt.  IV/Drains: Lost IV access this am.  Wounds/incisions: Old abdominal incisions are C/D/I.  Diet:Clears.  Number of times OUT OF BED this shift : Up IND.  Tests/Procedures: N/A.  Anticipated  DC Date: TBD.  Significant information: C/o itchy skin this entire shift - gave PRN Atarax & Benadryl w/ little relief.

## 2025-07-25 NOTE — DISCHARGE SUMMARY
[facility]  Hospitalist Discharge Summary      Date of Admission:  7/23/2025  Date of Discharge:  7/25/2025  Discharging Provider: Ina Hand MD  Discharge Service: Hospitalist Service    Discharge Diagnoses     To the hospital course below    Clinically Significant Risk Factors     # Moderate Malnutrition: based on nutrition assessment and treatment provided per dietitian's recommendations.      Follow-ups Needed After Discharge   Follow-up Appointments       Follow Up      Follow up with [facility] as scheduled next month        Hospital Follow-up with Existing Primary Care Provider (PCP)          Schedule Primary Care visit within: 14 Days                Unresulted Labs Ordered in the Past 30 Days of this Admission       No orders found from 6/23/2025 to 7/24/2025.        These results will be followed up by none     Discharge Disposition   Discharged to home  Condition at discharge: Stable    Hospital Course   Natty Bateman is a 54 year old female admitted on 7/23/2025. She Natty Bateman is a 54 year old female with past medical history of hypertension, dyslipidemia, diabetes mellitus type 2, recent admission for ascending colon enteritis with sepsis status post ERCP with stent placement, Klebsiella bacteremia, IBS, migraine, bipolar disorder, GERD, chronic pain-presented initially to outside ER for evaluation of abdominal pain.     As mentioned above the patient was admitted at [facility] from 6/8-6/12/2025 with abdominal pain; CT abdomen and pelvis showed a dilated gallbladder with new intra and extrahepatic biliary ductal dilation and evidence of choledocholithiasis.  She seemed to have sepsis associated with ascending cholangitis.  She underwent ERCP (Dr. Forman) with biliary sphincterotomy and temporary stent placed.  She also underwent laparoscopic cholecystectomy by surgery.  Blood cultures from outside hospital were positive for bacteremia with  Klebsiella pneumonia; she was treated with cefepime and Flagyl in the hospital and discharged on Augmentin     # Acute bilary gastritis  # History of gallstones/choledocholithiasis status post ERCP with stent placement on 6/9/2025 and status post laparoscopic cholecystectomy on 6/10/2025  * Presented with epigastric/upper abdomen pain that started yesterday morning associated with some nausea but no vomiting.   * Denies alcohol drinking  * No fever, no leukocytosis  *CT abdomen pelvis noted presence of plastic stent in the CBD, slightly increased size of CBD 1.7 cm which was 1.4 cm before.  Tiny pleural effusion and nonobstructing left kidney stone as well noted.      -Lipase elevated to 1144 but CT without pancreatitis as above; LFTs in ER are within normal limits, no elevated bilirubin.  WBC normal.  -Kept NPO and GI consulted, s/p EUS.  Noted markedly inflamed stomach lining, consistent with biliary gastritis.  No ulcer or active bleeding.  Stent in place, CBD sludge removed.  - Continued with PPI (reports she does not tolerate Protonix but does okay with omeprazole).  Started on clear liquid diet after EUS and advanced as tolerated.  Tolerating diet.  Carafate added.  - Avoid NSAIDs-taking diclofenac, started patient and discontinued.  Also on baby aspirin, no strong indication, continue.    - Has follow-up with Dr. Forman next month for his stent removal     -Pain management-Tylenol as needed, oxycodone. Did receive IV Dilaudid  (she states that she cannot tolerate oxy and Dilaudid, cannot tolerate morphine) and reported itching/hives on face- Not noted.  Did get Benadryl and tolerated it.     # Diabetes mellitus type 2  -PTA on Mounjaro and metformin held during hospital stay  -HbA1c was 6.2 on 6/9/2025  -ISS while in hospital  -Hypoglycemia protocol     # IBS  # GERD  # Bipolar disorder    # Hypertension  # Dyslipidemia  # Chronic pain syndrome  # migraine   - Resume PTA meds    # Moderate Protein-Calorie  Malnutrition   - based on RD assessment, nutrition consulted and supplements recommended     Consultations This Hospital Stay   GASTROENTEROLOGY IP CONSULT    Code Status   Full Code    Time Spent on this Encounter   I, Ina Hand MD, personally saw the patient today and spent greater than 30 minutes discharging this patient.       Ina Hand MD  [facility]  [address] [place] [address]  Phone: [phone]  Fax: [phone]  ______________________________________________________________________    Physical Exam   Vital Signs: Temp: 97.3  F (36.3  C) Temp src: Axillary BP: (!) 153/89 Pulse: 66   Resp: 18 SpO2: 99 % O2 Device: None (Room air)    Weight: 161 lbs 6.03 oz    General: AAOx3, appears comfortable.  HEENT: PERRLA EOMI. Mucosa moist.   Lungs: Bilateral equal air entry. Clear to auscultation, normal work of breathing.   CVS: S1S2 regular, no tachycardia or murmur.   Abdomen: Soft, mild epigastric tenderness, ND. BS heard.  MSK: No edema or deformities.  Neuro: AAOX3. CN 2-12 normal. Strength symmetrical.  Skin: No rash.          Primary Care Physician   [facility]    Discharge Orders      Reason for your hospital stay    Abd pain due to biliary gastritis     Activity    Your activity upon discharge: activity as tolerated     Follow Up    Follow up with [facility] as scheduled next month     Diet    Follow this diet upon discharge: Current Diet:Orders Placed This Encounter      Regular Diet Adult     Hospital Follow-up with Existing Primary Care Provider (PCP)            Significant Results and Procedures   Most Recent 3 CBC's:  Recent Labs   Lab Test 07/24/25  0711 07/23/25  0841 06/19/25  1149   WBC 10.0 8.1 9.9   HGB 14.5 12.1 11.5*   MCV 83 82 84    314 404     Most Recent 3 BMP's:  Recent Labs   Lab Test 07/24/25  0711 07/23/25  0841 07/23/25  0445 06/12/25  0759 06/12/25  0742 06/11/25  1532 06/11/25  1115    138   --   --   --   --  137   POTASSIUM 3.8 3.7  --   --  3.4   < > 3.3*   CHLORIDE 102 105  --   --   --   --  101   CO2 22 24  --   --   --   --  22   BUN 4.5* 6.3  --   --   --   --  4.1*   CR 0.76 0.67  --   --   --   --  0.54   ANIONGAP 13 9  --   --   --   --  14   YSED 9.1 8.4*  --   --   --   --  8.1*   * 84 88   < >  --    < > 165*    < > = values in this interval not displayed.     Most Recent 2 LFT's:  Recent Labs   Lab Test 07/24/25  0711 07/23/25  0841   AST 28 17   ALT 21 13   ALKPHOS 63 48   BILITOTAL 0.2 0.2     Most Recent 6 glucoses:  Recent Labs   Lab Test 07/24/25  0711 07/23/25  0841 07/23/25  0445 06/12/25  1159 06/12/25  0759 06/11/25  2158   * 84 88 105* 85 109*   ,   Results for orders placed or performed during the hospital encounter of 07/23/25   XR ERCP    Narrative    This exam was marked as non-reportable because it will not be read by a   radiologist or a [facility] non-radiologist provider.             Discharge Medications      Review of your medicines        START taking        Dose / Directions   dicyclomine 10 MG capsule  Commonly known as: BENTYL      Dose: 10 mg  Take 10 mg by mouth 3 times daily (before meals).  Refills: 0     sucralfate 1 GM tablet  Commonly known as: CARAFATE  Used for: Biliary gastritis      Dose: 1 g  Take 1 tablet (1 g) by mouth 4 times daily (before meals and nightly).  Quantity: 56 tablet  Refills: 0            CONTINUE these medicines which have NOT CHANGED        Dose / Directions   ACE/ARB/ARNI NOT PRESCRIBED  Commonly known as: INTENTIONAL      by Other route continuous prn.  Refills: 0     acetaminophen-codeine 300-30 MG per tablet  Commonly known as: TYLENOL #3  Used for: Postoperative pain      Dose: 1-2 tablet  Take 1-2 tablets by mouth every 6 hours as needed for pain (1 tablet for moderate pain, 2 tablets for severe pain).  Quantity: 20 tablet  Refills: 0     aluminum chloride 20 % external solution  Commonly known as: DRYSOL      Apply  topically daily as needed.  Refills: 0     Atogepant 60 MG tablet  Commonly known as: QULIPTA      Dose: 60 mg  Take 60 mg by mouth daily.  Refills: 0     Baclofen 5 MG tablet  Commonly known as: LIORESAL      Dose: 5 mg  Take 5 mg by mouth 3 times daily as needed (hiccups).  Refills: 0     calcium polycarbophil 625 MG tablet  Commonly known as: FIBERCON      Dose: 1 tablet  Take 1 tablet by mouth every evening.  Refills: 0     cloNIDine 0.1 MG tablet  Commonly known as: CATAPRES      Dose: 0.2 mg  Take 0.2 mg by mouth 2 times daily.  Refills: 0     estradiol 1 MG tablet  Commonly known as: ESTRACE      Dose: 1 mg  Take 1 mg by mouth daily.  Refills: 0     fluticasone 50 MCG/ACT nasal spray  Commonly known as: FLONASE      Dose: 2 spray  Spray 2 sprays into both nostrils daily as needed.  Refills: 0     hydrOXYzine HCl 50 MG tablet  Commonly known as: ATARAX      Take by mouth See Admin Instructions. Take 1 tablet by mouth twice a day and 2 tablets at bedtime.  Refills: 0     ketorolac 30 MG/ML injection  Commonly known as: TORADOL      Dose: 30 mg  Inject 30 mg into the muscle once a week as needed.  Refills: 0     lidocaine 5 % patch  Commonly known as: LIDODERM      Dose: 1 patch  Place 1 patch onto the skin as needed for moderate pain. To prevent lidocaine toxicity, patient should be patch free for 12 hrs daily.  Refills: 0     linaclotide 72 MCG capsule  Commonly known as: LINZESS      Dose: 72 mcg  Take 72 mcg by mouth every morning (before breakfast).  Refills: 0     MAGNESIUM CITRATE PO      Dose: 1 capsule  Take 1 capsule by mouth daily. afternoon  Refills: 0     metFORMIN 750 MG 24 hr tablet  Commonly known as: GLUCOPHAGE-XR      Dose: 750 mg  Take 750 mg by mouth daily (with breakfast).  Refills: 0     methocarbamol 750 MG tablet  Commonly known as: ROBAXIN      Dose: 750 mg  Take 750 mg by mouth at bedtime. Plus additional 750 mg daily PRN  Refills: 0     minoxidil 2.5 MG tablet  Commonly known as:  LONITEN      Dose: 1.25 mg  Take 1.25 mg by mouth daily.  Refills: 0     multivitamin, therapeutic Tabs tablet      Dose: 1 tablet  Take 1 tablet by mouth daily.  Refills: 0     Nurtec 75 MG ODT tablet  Generic drug: rimegepant      Dose: 75 mg  Place 75 mg under the tongue as needed for migraine.  Refills: 0     omeprazole 40 MG DR capsule  Commonly known as: PriLOSEC      Dose: 40 mg  Take 40 mg by mouth 2 times daily.  Refills: 0     ondansetron 4 MG ODT tab  Commonly known as: ZOFRAN ODT      Dose: 4 mg  Take 4 mg by mouth every 8 hours as needed for nausea.  Refills: 0     promethazine 12.5 MG tablet  Commonly known as: PHENERGAN      Dose: 12.5 mg  Take 12.5 mg by mouth 2 times daily as needed for nausea.  Refills: 0     QUEtiapine 50 MG tablet  Commonly known as: SEROquel      Dose: 50 mg  Take 50 mg by mouth at bedtime.  Refills: 0     rosuvastatin 5 MG tablet  Commonly known as: CRESTOR      Dose: 5 mg  Take 5 mg by mouth at bedtime.  Refills: 0     sennosides 8.6 MG tablet  Commonly known as: SENOKOT      Dose: 2 tablet  Take 2 tablets by mouth at bedtime.  Refills: 0     * SUMAtriptan 6 MG/0.5ML injection  Commonly known as: IMITREX      Dose: 6 mg  Inject 6 mg subcutaneously at onset of headache for migraine. May repeat 1 hour after the first dose. Do not exceed 2 doses per 24 hours*  Refills: 0     * SUMAtriptan 100 MG tablet  Commonly known as: IMITREX      Dose: 100 mg  Take 100 mg by mouth at onset of headache for migraine. May repeat after 2 hours if necessary, not to exceed 200mg in 24hrs  Refills: 0     TIRZEPATIDE SC      Inject subcutaneously once a week. On Wednesdays. Pt believes dose is 12.5 mg but not positive (could be 15 mg, or 10 mg)  Refills: 0     ubrogepant 100 MG tablet  Commonly known as: UBRELVY      Dose: 100 mg  Take 100 mg by mouth at onset of headache (for migraine). May repeat after 2 hrs as needed. Max of 200 mg in 24 hours.  Refills: 0     vilazodone 40 MG Tabs  tablet  Commonly known as: VIIBRYD      Dose: 40 mg  Take 40 mg by mouth every morning.  Refills: 0     vitamin B-Complex      Dose: 1 tablet  Take 1 tablet by mouth daily.  Refills: 0     Vitamin D3 25 mcg (1000 units) tablet  Commonly known as: CHOLECALCIFEROL      Dose: 25 mcg  Take 25 mcg by mouth daily.  Refills: 0           * This list has 2 medication(s) that are the same as other medications prescribed for you. Read the directions carefully, and ask your doctor or other care provider to review them with you.                STOP taking      aspirin 81 MG chewable tablet  Commonly known as: ASA        diclofenac 75 MG EC tablet  Commonly known as: VOLTAREN                  Where to get your medicines        These medications were sent to [facility] [place]  LAZARO S[facility] - [address] [facility] [place] [facility]  [address] [place] [address] [address]      Phone: [phone]   sucralfate 1 GM tablet       Allergies   Allergies   Allergen Reactions    Adhesive Tape Dermatitis     Sensitivity to band-aids, NOT dermabond or liquid adhesives.    Clavulanic Acid Diarrhea    Depakene [Valproic Acid] Other (See Comments)    Fentanyl Itching     Patient states she had full body itching when she woke up from surgery and believes it was Fentanyl that caused the itching. This was reported in a follow up phone call.     Geodon [Ziprasidone] Other (See Comments)    Lisinopril     Pantoprazole GI Disturbance, Diarrhea and Hives    Tegaderm Transparent Dressing (Informational Only) Dermatitis    Valsartan Dermatitis, Swelling and Hives    Vortioxetine Itching    Dilaudid [Hydromorphone] Itching    Morphine Hives, Itching, Rash and Tinnitus    Oxycodone Itching

## 2025-07-25 NOTE — PLAN OF CARE
Goal Outcome Evaluation:      Summary:  Acute pancreatitis, Abd pain   - Esophagogastroduodenoscopy, w/ endoscopic ultrasound guidance ERCP,  Complex 7/24     Date & Time: 7/24/25 3762-1383   Orientation: A&O x4    Activity Level: Ind   Fall Risk: No    Behavior & Aggression: Green   Pain Management: Scheduled robaxin, Lidocaine patch at L upper back, PRN oxycodone    ABNL VS/O2: VSS on RA, ex HTN    Tele: N/A   ABNL Lab/BG: Refused BS checks  Diet: Clear    Bowel/Bladder: Continent   Skin:  Abd scab from previous surgery.   Drains/Devices: PIV SL   Tests/Procedures:   Anticipated DC Date: Pending     Other Important Info:

## 2025-07-25 NOTE — PROGRESS NOTES
[facility]  Gastroenterology Progress Note     Natty Bateman MRN# [id number]   YOB: 1970 Age: 54 year old          Assessment and Plan:   Natty Bateman is a 54 year old female admitted on 7/23/2025. She Natty Bateman is a 54 year old female with past medical history of hypertension, dyslipidemia, diabetes mellitus type 2, recent admission for ascending colon enteritis with sepsis status post ERCP with stent placement, Klebsiella bacteremia, IBS, migraine, bipolar disorder, GERD, chronic pain-presented initially 32-12 ER for evaluation of abdominal pain.      Pancreatitis  Abdominal pain  S/p ERCP for cholangitis on 6/9/25  Presented with epigastric/upper abdomen pain started yesterday morning associated with some nausea but no vomiting. Pain located primarily on left and left flank- unusual presentation- pain more associated with musculoskeletal pain  Denies alcohol use, no viral illness, no fever, chills or rigors  Lipase 1100, LFTs normal  CT abdomen/pelvis done in ER:1.  Interval cholecystectomy. Plastic common bile duct stent has been placed with slight increase in the common bile duct dilatation now measuring 1.7 cm. Intrahepatic bile duct dilatation is slightly improved. There is new pneumobilia. There is some biliary enhancement now seen.   Findings indicative of acute pancreatitis possibly related to sludge or stone or virus vs other vs Related to PUD     - EUS with ERCP and stent removal 7/24/25:  markedly inflamed stomach lining, consistent with biliary gastritis.  No ulcer or active bleeding.  Stent in place, CBD sludge removed.  - LFTs all within normal limits.  CRP normal.  - Continued with PPI (reports she does not tolerate Protonix but does okay with omeprazole).    - Advance tolerated.  Tolerating diet.  Carafate added.  - Avoid NSAIDs  - Pain medications as needed, defer to hospitalist.  --Follow up with [facility] next month for stent removal, as scheduled.  Office phone: [phone]        Interval History:     Doing well. No f/c, n/v/d. Abd pain controlled on meds.              Review of Systems:     C: NEGATIVE for fever, chills, change in weight  E/M: NEGATIVE for ear, mouth and throat problems  R: NEGATIVE for significant cough or SOB  CV: NEGATIVE for chest pain, palpitations or peripheral edema             Medications:   I have reviewed this patient's current medications  Current Facility-Administered Medications   Medication Dose Route Frequency Provider Last Rate Last Admin    [Held by provider] aspirin (ASA) chewable tablet 81 mg  81 mg Oral Daily Ina Hand MD        Atogepant (QULIPTA) tablet 60 mg  60 mg Oral Daily Ina Hand MD        calcium polycarbophil (FIBERCON) tablet 625 mg  625 mg Oral QPM Ina Hand MD        cloNIDine (CATAPRES) tablet 0.2 mg  0.2 mg Oral BID Ina Hand MD   0.2 mg at 07/24/25 2120    dicyclomine (BENTYL) capsule 10 mg  10 mg Oral TID AC Ina Hand MD   10 mg at 07/25/25 0611    estradiol (ESTRACE) tablet 1 mg  1 mg Oral Daily Ina Hand MD   1 mg at 07/24/25 0815    insulin aspart (NovoLOG) injection (RAPID ACTING)  1-7 Units Subcutaneous TID AC Ina Hand MD        insulin aspart (NovoLOG) injection (RAPID ACTING)  1-5 Units Subcutaneous At Bedtime Ina Hand MD        Lidocaine (LIDOCARE) 4 % Patch 1 patch  1 patch Transdermal Q24h Javi Butts MD   1 patch at 07/24/25 1949    Lidocaine (LIDOCARE) 4 % Patch 1 patch  1 patch Transdermal Q24H Ina Hand MD        linaclotide (LINZESS) capsule 72 mcg  72 mcg Oral QAM AC Ina Hand MD   72 mcg at 07/25/25 0611    methocarbamol (ROBAXIN) tablet 750 mg  750 mg Oral At Bedtime Ina Hand MD   750 mg at 07/24/25 2120    minoxidil (LONITEN) half-tab 1.25 mg  1.25 mg Oral Daily Ina Hand MD   1.25 mg at 07/24/25 0815    pantoprazole (PROTONIX) injection 40 mg  40 mg Intravenous BID AC  Ina Hand MD        QUEtiapine (SEROquel) tablet 50 mg  50 mg Oral At Bedtime Ina Hand MD   50 mg at 07/24/25 2121    rosuvastatin (CRESTOR) tablet 5 mg  5 mg Oral At Bedtime Ina Hand MD   5 mg at 07/24/25 2120    sodium chloride (PF) 0.9% PF flush 3 mL  3 mL [facility]ac[facility]ter [facility]H [facility]   3 mL at 07/23/25 2120    sucralfate (CARAFATE) tablet 1 g  1 g Oral 4x Daily AC & HS Ina Hand MD        vilazodone (VIIBRYD) tablet 40 mg  40 mg Oral QAM Ina Hand MD   40 mg at 07/23/25 1802                  Physical Exam:   Vitals were reviewed  Vital Signs with Ranges  Temp:  [95.7  F (35.4  C)-97.7  F (36.5  C)] 97.3  F (36.3  C)  Pulse:  [66-80] 66  Resp:  [11-21] 18  BP: (129-153)/() 153/89  SpO2:  [96 %-100 %] 99 %  I/O last 3 completed shifts:  In: 1121.67 [I.V.:1121.67]  Out: 1650 [Urine:1650]  Constitutional: Alert, oriented, in NAD  Respiratory:  No labored breathing.  GI:  Abdomen soft, TTP in epigastrium  Skin/Integumen:  No jaundice  MSK: Moves all 4 ext             Data:   I reviewed the patient's new clinical lab test results.   Recent Labs   Lab Test 07/24/25  0711 07/23/25  0841 06/19/25  1149   WBC 10.0 8.1 9.9   HGB 14.5 12.1 11.5*   MCV 83 82 84    314 404     Recent Labs   Lab Test 07/24/25  0711 07/23/25  0841 06/12/25  0742 06/12/25  0056 06/11/25  1115   POTASSIUM 3.8 3.7 3.4   < > 3.3*   CHLORIDE 102 105  --   --  101   CO2 22 24  --   --  22   BUN 4.5* 6.3  --   --  4.1*   ANIONGAP 13 9  --   --  14    < > = values in this interval not displayed.     Recent Labs   Lab Test 07/24/25  0711 07/23/25  0841 06/19/25  1149 06/11/25  1115 06/10/25  0639   ALBUMIN 3.9 3.3* 4.0   < > 3.1*   BILITOTAL 0.2 0.2 0.3   < > 0.7   ALT 21 13 21   < > 120*   AST 28 17 21   < > 95*   LIPASE 205* 89*  --   --  165*    < > = values in this interval not displayed.       I reviewed the patient's new imaging results.    All laboratory data reviewed  All  imaging studies reviewed by me.    Kenneth Abrams PA-C,  7/25/2025  [facility]  Office : [phone]  Cell: [phone] (Dr. Forman)

## 2025-07-25 NOTE — PLAN OF CARE
Patient discharging home with family. AVS gone over with patient in room and prescriptions given to patient. All belongings left with patient. All questions answered.

## 2025-07-26 ENCOUNTER — PATIENT OUTREACH (OUTPATIENT)
Dept: CARE COORDINATION | Facility: CLINIC | Age: 55
End: 2025-07-26
Payer: MEDICARE

## 2025-07-26 NOTE — PROGRESS NOTES
[facility]: Transitions of Care Outreach  Chief Complaint   Patient presents with    Clinic Care Coordination - Post Hospital       Most Recent Admission Date: 7/23/2025   Most Recent Admission Diagnosis: Pancreatitis - K85.90     Most Recent Discharge Date: 7/25/2025   Most Recent Discharge Diagnosis: Acute biliary pancreatitis without infection or necrosis - K85.10  Biliary gastritis - K29.60     Transitions of Care Assessment    Discharge Assessment  How are you doing now that you are home?: Patient states she is feeling a lot better since she got home.  How are your symptoms? (Red Flag symptoms escalate to triage hotline per guidelines): Improved  Do you know how to contact your clinic care team if you have future questions or changes to your health status? : Yes  Does the patient have their discharge instructions? : Yes  Does the patient have questions regarding their discharge instructions? : No  Were you started on any new medications or were there changes to any of your previous medications? : Yes  Does the patient have all of their medications?: Yes  Do you have questions regarding any of your medications? : No  Do you have all of your needed medical supplies or equipment (DME)?  (i.e. oxygen tank, CPAP, cane, etc.): Yes         Post-op (Clinicians Only)  Did the patient have surgery or a procedure: Yes  Fever: No  Chills: No  Eating & Drinking: eating and drinking without complaints/concerns  PO Intake: regular diet  Additional Symptoms:  (Denies)  Bowel Function:  (Patient is unsure if she had a bowel movement, denies passing gas. Patient will call if she is unable to have a bowel movement, develops bloating, pain or nausea.)  Urinary Status: voiding without complaint/concerns    [facility] Community Health Worker Initial Outreach    CHW Initial Information Gathering:  Referral Source: Health Plan  Preferred Hospital: Other ([facility])  Preferred Urgent Care: Other ([facility])  Current  living arrangement:: I live in a private home with spouse  Type of residence:: Apartment - handicap accessible  Community Resources: None  Supplies Currently Used at Home: Diabetic Supplies, Gloves, Compression Stockings  Equipment Currently Used at Home: walker, standard, cane, straight, glucometer  Informal Support system:: Family, Friends, Neighbors  No PCP office visit in Past Year: No  Transportation means:: Regular car       Patient accepts CC: Yes. Patient scheduled for assessment with Hanny Olson RN on 8/4/25 at 1pm. Patient noted desire to discuss help with education around medical needs.     Follow up Plan     Discharge Follow-Up  Discharge follow up appointment scheduled in alignment with recommended follow up timeframe or Transitions of Risk Category? (Low = within 30 days; Moderate= within 14 days; High= within 7 days): Yes  Discharge Follow Up Appointment Date: 07/29/25  Discharge Follow Up Appointment Scheduled with?: Primary Care Provider    Future Appointments   Date Time Provider Department Center   7/29/2025 10:30 AM Kristina Mullen APRN CNP [facility]       Outpatient Plan as outlined on AVS reviewed with patient.    For any urgent concerns, please contact our 24 hour nurse triage line: [phone] ([phone])       Siomara Garcia RN

## 2025-07-28 ASSESSMENT — PATIENT HEALTH QUESTIONNAIRE - PHQ9
10. IF YOU CHECKED OFF ANY PROBLEMS, HOW DIFFICULT HAVE THESE PROBLEMS MADE IT FOR YOU TO DO YOUR WORK, TAKE CARE OF THINGS AT HOME, OR GET ALONG WITH OTHER PEOPLE: VERY DIFFICULT
SUM OF ALL RESPONSES TO PHQ QUESTIONS 1-9: 12
SUM OF ALL RESPONSES TO PHQ QUESTIONS 1-9: 12

## 2025-07-29 ENCOUNTER — MYC MEDICAL ADVICE (OUTPATIENT)
Dept: FAMILY MEDICINE | Facility: CLINIC | Age: 55
End: 2025-07-29

## 2025-07-29 ENCOUNTER — RESULTS FOLLOW-UP (OUTPATIENT)
Dept: FAMILY MEDICINE | Facility: CLINIC | Age: 55
End: 2025-07-29

## 2025-07-29 ENCOUNTER — OFFICE VISIT (OUTPATIENT)
Dept: FAMILY MEDICINE | Facility: CLINIC | Age: 55
End: 2025-07-29
Payer: COMMERCIAL

## 2025-07-29 VITALS — SYSTOLIC BLOOD PRESSURE: 115 MMHG | OXYGEN SATURATION: 98 % | DIASTOLIC BLOOD PRESSURE: 83 MMHG | HEART RATE: 77 BPM

## 2025-07-29 DIAGNOSIS — R10.11 RUQ ABDOMINAL PAIN: ICD-10-CM

## 2025-07-29 DIAGNOSIS — N89.8 VAGINAL ITCHING: ICD-10-CM

## 2025-07-29 DIAGNOSIS — K29.60 BILIARY GASTRITIS: Primary | ICD-10-CM

## 2025-07-29 LAB
CLUE CELLS: ABNORMAL
TRICHOMONAS, WET PREP: ABNORMAL
WBC'S/HIGH POWER FIELD, WET PREP: ABNORMAL
YEAST, WET PREP: ABNORMAL

## 2025-07-29 PROCEDURE — 87210 SMEAR WET MOUNT SALINE/INK: CPT | Performed by: NURSE PRACTITIONER

## 2025-07-29 PROCEDURE — 3074F SYST BP LT 130 MM HG: CPT | Performed by: NURSE PRACTITIONER

## 2025-07-29 PROCEDURE — G2211 COMPLEX E/M VISIT ADD ON: HCPCS | Performed by: NURSE PRACTITIONER

## 2025-07-29 PROCEDURE — 1111F DSCHRG MED/CURRENT MED MERGE: CPT | Performed by: NURSE PRACTITIONER

## 2025-07-29 PROCEDURE — 99213 OFFICE O/P EST LOW 20 MIN: CPT | Mod: 24 | Performed by: NURSE PRACTITIONER

## 2025-07-29 PROCEDURE — 3079F DIAST BP 80-89 MM HG: CPT | Performed by: NURSE PRACTITIONER

## 2025-07-29 PROCEDURE — 1126F AMNT PAIN NOTED NONE PRSNT: CPT | Performed by: NURSE PRACTITIONER

## 2025-07-29 RX ORDER — TIRZEPATIDE 12.5 MG/.5ML
INJECTION, SOLUTION SUBCUTANEOUS
COMMUNITY
Start: 2025-07-09

## 2025-07-29 RX ORDER — TIRZEPATIDE 15 MG/.5ML
15 INJECTION, SOLUTION SUBCUTANEOUS WEEKLY
COMMUNITY
Start: 2025-07-14

## 2025-07-29 ASSESSMENT — PAIN SCALES - GENERAL: PAINLEVEL_OUTOF10: NO PAIN (0)

## 2025-07-29 NOTE — PROGRESS NOTES
"  Assessment & Plan     Biliary gastritis  Reviewed importance of avoiding NSAIDS  Continue carafate, PPI    RUQ abdominal pain  Resolved.     Vaginal itching  Wet prep without evidence of yeast or BV.   Recommended preventive visit and pelvic exam in near future.   - Wet prep - Clinic Collect      MED REC REQUIRED  Post Medication Reconciliation Status:  Discharge medications reconciled and changed, see notes/orders ok to resume metformin and tirzepatide. (She did not have pancreatitis inpatient)  BMI  Estimated body mass index is 28.1 kg/m  as calculated from the following:    Height as of 6/19/25: 1.614 m (5' 3.54\").    Weight as of 7/23/25: 73.2 kg (161 lb 6 oz).       Follow-up in a month for preventive care.    Brenda Luz is a 54 year old, presenting for the following health issues:  Hospital F/U      7/29/2025    10:19 AM   Additional Questions   Roomed by pat         7/29/2025   Forms   Any forms needing to be completed Yes     HPI          7/26/2025   Post Discharge Outreach   How are you doing now that you are home? Patient states she is feeling a lot better since she got home.   How are your symptoms? (Red Flag symptoms escalate to triage hotline per guidelines) Improved   Does the patient have their discharge instructions?  Yes   Does the patient have questions regarding their discharge instructions?  No   Were you started on any new medications or were there changes to any of your previous medications?  Yes   Does the patient have all of their medications? Yes   Do you have questions regarding any of your medications?  No   Do you have all of your needed medical supplies or equipment (DME)?  (i.e. oxygen tank, CPAP, cane, etc.) Yes   Discharge Follow Up Appointment Date 7/29/2025   Discharge Follow Up Appointment Scheduled with? Primary Care Provider       Hospital Follow-up Visit:    Hospital/Nursing Home/IP Rehab Facility: [facility]  Most Recent Admission Date: " 7/23/2025   Most Recent Admission Diagnosis: Pancreatitis - K85.90     Most Recent Discharge Date: 7/25/2025   Most Recent Discharge Diagnosis: Acute biliary pancreatitis without infection or necrosis - K85.10  Biliary gastritis - K29.60   Do you have any other stressors you would like to discuss with your provider? OTHER: yeast infection    Problems taking medications regularly:  None  Medication changes since discharge: None  Problems adhering to non-medication therapy:  None    Summary of hospitalization:  [facility] discharge summary reviewed  Diagnostic Tests/Treatments reviewed.  Follow up needed: with GI. Scheduled.  Other Healthcare Providers Involved in Patient s Care:         Gastroenterology  Update since discharge: improved.         Plan of care communicated with patient                 Review of Systems  Vaginal itching      Objective    /83 (BP Location: Left arm, Patient Position: Sitting, Cuff Size: Adult Large)   Pulse 77   LMP 04/12/2010   SpO2 98%   There is no height or weight on file to calculate BMI.  Physical Exam   GENERAL: alert and no distress  RESP: lungs clear to auscultation - no rales, rhonchi or wheezes  CV: regular rate and rhythm, normal S1 S2, no S3 or S4, no murmur, click or rub, no peripheral edema  ABDOMEN: soft, nontender, no hepatosplenomegaly, no masses and bowel sounds normal  MS: no gross musculoskeletal defects noted, no edema          Signed Electronically by: CHAU Cool CNP

## 2025-07-29 NOTE — PROGRESS NOTES
{PROVIDER CHARTING PREFERENCE:681467}    Brenda Luz is a 54 year old, presenting for the following health issues:  Hospital F/U      7/29/2025    10:19 AM   Additional Questions   Roomed by pat         7/29/2025   Forms   Any forms needing to be completed Yes     HPI          7/26/2025   Post Discharge Outreach   How are you doing now that you are home? Patient states she is feeling a lot better since she got home.   How are your symptoms? (Red Flag symptoms escalate to triage hotline per guidelines) Improved   Does the patient have their discharge instructions?  Yes   Does the patient have questions regarding their discharge instructions?  No   Were you started on any new medications or were there changes to any of your previous medications?  Yes   Does the patient have all of their medications? Yes   Do you have questions regarding any of your medications?  No   Do you have all of your needed medical supplies or equipment (DME)?  (i.e. oxygen tank, CPAP, cane, etc.) Yes   Discharge Follow Up Appointment Date 7/29/2025   Discharge Follow Up Appointment Scheduled with? Primary Care Provider       Hospital Follow-up Visit:    Hospital/Nursing Home/IP Rehab Facility: {INPT AND SNF DISCHARGE:116289}  Most Recent Admission Date: 7/23/2025   Most Recent Admission Diagnosis: Pancreatitis - K85.90     Most Recent Discharge Date: 7/25/2025   Most Recent Discharge Diagnosis: Acute biliary pancreatitis without infection or necrosis - K85.10  Biliary gastritis - K29.60   Do you have any other stressors you would like to discuss with your provider? { :358924}    Problems taking medications regularly:  {:650533}  Medication changes since discharge: {:079009}  Problems adhering to non-medication therapy:  {:090970}    Summary of hospitalization:  {:540839}  Diagnostic Tests/Treatments reviewed.  Follow up needed: {:445261:}  Other Healthcare Providers Involved in Patient s Care:         {those currently involved after  "discharge:184397::\"None\"}  Update since discharge: {:181733} {TIP  Include information from family/caregivers, SNF, Care Coordination :059109}        Plan of care communicated with {:223991}     {Reference  Coding guidelines- Moderate Complexity F2F/Video within 7 - 14 days of discharge [id number], High Complexity F2F/Video within 7 days [id number] or [id number] days [id number] :925575}      {additonal problems for provider to add (Optional):400765}    {ROS Picklists (Optional):260335}      Objective    /83 (BP Location: Left arm, Patient Position: Sitting, Cuff Size: Adult Large)   Pulse 77   LMP 04/12/2010   SpO2 98%   There is no height or weight on file to calculate BMI.  Physical Exam   {Exam List (Optional):383191}    {Diagnostic Test Results (Optional):946615}        Signed Electronically by: CHAU Cool CNP  {Email feedback regarding this note to [email or web address]   :608626}  "

## 2025-07-30 NOTE — TELEPHONE ENCOUNTER
Spoke with patient, Faxed forms to fax number provided on forms: [phone].     Picked up completed forms,Scanned to medical records, faxed and filed in team 1 drawer.    Placed the original forms at the  for patient to .

## 2025-07-31 LAB — ERCP: NORMAL

## 2025-08-04 ENCOUNTER — PATIENT OUTREACH (OUTPATIENT)
Dept: NURSING | Facility: CLINIC | Age: 55
End: 2025-08-04
Payer: COMMERCIAL

## 2025-08-18 ENCOUNTER — OFFICE VISIT (OUTPATIENT)
Dept: FAMILY MEDICINE | Facility: CLINIC | Age: 55
End: 2025-08-18
Payer: COMMERCIAL

## 2025-08-18 VITALS
SYSTOLIC BLOOD PRESSURE: 128 MMHG | TEMPERATURE: 97.5 F | WEIGHT: 151 LBS | OXYGEN SATURATION: 98 % | BODY MASS INDEX: 26.29 KG/M2 | DIASTOLIC BLOOD PRESSURE: 82 MMHG | HEART RATE: 77 BPM

## 2025-08-18 DIAGNOSIS — S93.491D SPRAIN OF ANTERIOR TALOFIBULAR LIGAMENT OF RIGHT ANKLE, SUBSEQUENT ENCOUNTER: Primary | ICD-10-CM

## 2025-08-18 PROCEDURE — 3074F SYST BP LT 130 MM HG: CPT | Performed by: NURSE PRACTITIONER

## 2025-08-18 PROCEDURE — 99213 OFFICE O/P EST LOW 20 MIN: CPT | Performed by: NURSE PRACTITIONER

## 2025-08-18 PROCEDURE — 1125F AMNT PAIN NOTED PAIN PRSNT: CPT | Performed by: NURSE PRACTITIONER

## 2025-08-18 PROCEDURE — 3079F DIAST BP 80-89 MM HG: CPT | Performed by: NURSE PRACTITIONER

## 2025-08-18 ASSESSMENT — PAIN SCALES - GENERAL: PAINLEVEL_OUTOF10: SEVERE PAIN (8)

## 2025-08-21 ENCOUNTER — PATIENT OUTREACH (OUTPATIENT)
Dept: CARE COORDINATION | Facility: CLINIC | Age: 55
End: 2025-08-21
Payer: MEDICARE

## 2025-08-26 ENCOUNTER — OFFICE VISIT (OUTPATIENT)
Dept: FAMILY MEDICINE | Facility: CLINIC | Age: 55
End: 2025-08-26
Payer: COMMERCIAL

## 2025-08-26 VITALS
RESPIRATION RATE: 18 BRPM | WEIGHT: 151 LBS | HEART RATE: 82 BPM | HEIGHT: 63 IN | TEMPERATURE: 96.7 F | OXYGEN SATURATION: 98 % | SYSTOLIC BLOOD PRESSURE: 127 MMHG | BODY MASS INDEX: 26.75 KG/M2 | DIASTOLIC BLOOD PRESSURE: 84 MMHG

## 2025-08-26 DIAGNOSIS — S93.491D SPRAIN OF ANTERIOR TALOFIBULAR LIGAMENT OF RIGHT ANKLE, SUBSEQUENT ENCOUNTER: Primary | ICD-10-CM

## 2025-08-26 PROCEDURE — 99213 OFFICE O/P EST LOW 20 MIN: CPT | Performed by: NURSE PRACTITIONER

## 2025-08-26 PROCEDURE — 3079F DIAST BP 80-89 MM HG: CPT | Performed by: NURSE PRACTITIONER

## 2025-08-26 PROCEDURE — 1125F AMNT PAIN NOTED PAIN PRSNT: CPT | Performed by: NURSE PRACTITIONER

## 2025-08-26 PROCEDURE — 3074F SYST BP LT 130 MM HG: CPT | Performed by: NURSE PRACTITIONER

## 2025-08-26 ASSESSMENT — PAIN SCALES - GENERAL: PAINLEVEL_OUTOF10: SEVERE PAIN (8)

## 2025-08-28 ENCOUNTER — MYC MEDICAL ADVICE (OUTPATIENT)
Dept: FAMILY MEDICINE | Facility: CLINIC | Age: 55
End: 2025-08-28
Payer: MEDICARE

## 2025-08-28 DIAGNOSIS — K13.70 ORAL LESION: Primary | ICD-10-CM

## 2025-08-28 DIAGNOSIS — K29.60 BILIARY GASTRITIS: ICD-10-CM

## 2025-08-28 RX ORDER — NYSTATIN 100000 [USP'U]/ML
500000 SUSPENSION ORAL 4 TIMES DAILY
Qty: 60 ML | Refills: 0 | Status: SHIPPED | OUTPATIENT
Start: 2025-08-28

## 2025-08-28 RX ORDER — SUCRALFATE 1 G/1
1 TABLET ORAL
Qty: 360 TABLET | Refills: 0 | Status: SHIPPED | OUTPATIENT
Start: 2025-08-28

## 2025-08-28 SDOH — HEALTH STABILITY: PHYSICAL HEALTH: ON AVERAGE, HOW MANY DAYS PER WEEK DO YOU ENGAGE IN MODERATE TO STRENUOUS EXERCISE (LIKE A BRISK WALK)?: 0 DAYS

## 2025-08-28 SDOH — HEALTH STABILITY: PHYSICAL HEALTH: ON AVERAGE, HOW MANY MINUTES DO YOU ENGAGE IN EXERCISE AT THIS LEVEL?: 10 MIN

## 2025-09-02 ENCOUNTER — OFFICE VISIT (OUTPATIENT)
Dept: FAMILY MEDICINE | Facility: CLINIC | Age: 55
End: 2025-09-02
Attending: NURSE PRACTITIONER
Payer: COMMERCIAL

## 2025-09-02 VITALS — TEMPERATURE: 97.2 F | OXYGEN SATURATION: 99 % | HEART RATE: 79 BPM | RESPIRATION RATE: 18 BRPM

## 2025-09-02 DIAGNOSIS — E11.9 TYPE 2 DIABETES, HBA1C GOAL < 7% (H): ICD-10-CM

## 2025-09-02 DIAGNOSIS — Z00.00 ENCOUNTER FOR MEDICARE ANNUAL WELLNESS EXAM: Primary | ICD-10-CM

## 2025-09-02 DIAGNOSIS — Z96.82 SACRAL NERVE STIMULATOR PRESENT: ICD-10-CM

## 2025-09-02 DIAGNOSIS — Z79.899 POLYPHARMACY: ICD-10-CM

## 2025-09-02 DIAGNOSIS — G43.009 MIGRAINE WITHOUT AURA AND WITHOUT STATUS MIGRAINOSUS, NOT INTRACTABLE: ICD-10-CM

## 2025-09-02 DIAGNOSIS — F11.90 CHRONIC NARCOTIC USE: ICD-10-CM

## 2025-09-02 DIAGNOSIS — F33.1 MODERATE EPISODE OF RECURRENT MAJOR DEPRESSIVE DISORDER (H): ICD-10-CM

## 2025-09-02 PROBLEM — G89.29 CHRONIC NECK PAIN: Status: ACTIVE | Noted: 2025-09-02

## 2025-09-02 PROBLEM — K83.09 CHOLANGITIS (H): Status: RESOLVED | Noted: 2025-06-08 | Resolved: 2025-09-02

## 2025-09-02 PROBLEM — K80.50 CHOLEDOCHOLITHIASIS: Status: RESOLVED | Noted: 2025-06-08 | Resolved: 2025-09-02

## 2025-09-02 PROBLEM — M54.2 CHRONIC NECK PAIN: Status: ACTIVE | Noted: 2025-09-02

## 2025-09-02 PROCEDURE — 90677 PCV20 VACCINE IM: CPT | Performed by: NURSE PRACTITIONER

## 2025-09-02 PROCEDURE — 99396 PREV VISIT EST AGE 40-64: CPT | Mod: 24 | Performed by: NURSE PRACTITIONER

## 2025-09-02 PROCEDURE — 90673 RIV3 VACCINE NO PRESERV IM: CPT | Performed by: NURSE PRACTITIONER

## 2025-09-02 PROCEDURE — 82570 ASSAY OF URINE CREATININE: CPT | Performed by: NURSE PRACTITIONER

## 2025-09-02 PROCEDURE — 99214 OFFICE O/P EST MOD 30 MIN: CPT | Mod: 25 | Performed by: NURSE PRACTITIONER

## 2025-09-02 PROCEDURE — 82043 UR ALBUMIN QUANTITATIVE: CPT | Performed by: NURSE PRACTITIONER

## 2025-09-02 PROCEDURE — 90471 IMMUNIZATION ADMIN: CPT | Performed by: NURSE PRACTITIONER

## 2025-09-02 PROCEDURE — 1126F AMNT PAIN NOTED NONE PRSNT: CPT | Performed by: NURSE PRACTITIONER

## 2025-09-02 PROCEDURE — 90472 IMMUNIZATION ADMIN EACH ADD: CPT | Performed by: NURSE PRACTITIONER

## 2025-09-02 ASSESSMENT — PAIN SCALES - GENERAL: PAINLEVEL_OUTOF10: NO PAIN (0)

## 2025-09-03 ENCOUNTER — MYC MEDICAL ADVICE (OUTPATIENT)
Dept: FAMILY MEDICINE | Facility: CLINIC | Age: 55
End: 2025-09-03
Payer: MEDICARE

## 2025-09-03 LAB
CREAT UR-MCNC: 255 MG/DL
MICROALBUMIN UR-MCNC: 79.2 MG/L
MICROALBUMIN/CREAT UR: 31.06 MG/G CR (ref 0–25)

## (undated) DEVICE — KIT TURNOVER FAIRVIEW SOUTHDALE FULL SP3889

## (undated) DEVICE — PREP CHLORAPREP 26ML TINTED HI-LITE ORANGE 930815

## (undated) DEVICE — ENDO TUBING W/CAP AUXILARY WATER INLET 100609 EGA-500

## (undated) DEVICE — DRSG STERI STRIP 1/2X4" R1547

## (undated) DEVICE — ESU PENCIL W/HOLSTER

## (undated) DEVICE — LINEN TOWEL PACK X5 5464

## (undated) DEVICE — Device

## (undated) DEVICE — SYR 20ML LL W/O NDL 302830

## (undated) DEVICE — DEVICE SUTURE PASSER 14GA WECK EFX EFXSP2

## (undated) DEVICE — WARMER SCOPE DISPOSABLE DLW510

## (undated) DEVICE — NDL INSUFFLATION 13GA 120MM C2201

## (undated) DEVICE — SYR 30ML LL W/O NDL 302832

## (undated) DEVICE — NDL CANNULA INTERLINK BLUNT 18GA

## (undated) DEVICE — RAD RX ISOVUE 300 (50ML) 61% IOPAMIDOL CHARGE PER ML

## (undated) DEVICE — SOL WATER IRRIG 1000ML BOTTLE 2F7114

## (undated) DEVICE — SOL NACL 0.9% IRRIG 1000ML BOTTLE 2F7124

## (undated) DEVICE — ESU PENCIL SMOKE EVAC W/ROCKER SWITCH 0703-047-000

## (undated) DEVICE — SOL NACL 0.9% INJ 1000ML BAG 2B1324X

## (undated) DEVICE — ESU GROUND PAD UNIVERSAL W/O CORD

## (undated) DEVICE — PACK LAP CHOLE SLC15LCFSD

## (undated) DEVICE — EVAC SYSTEM CLEAR FLOW SC082500

## (undated) DEVICE — CLIP APPLIER ENDO 5MM M/L LIGAMAX EL5ML

## (undated) DEVICE — ENDO TROCAR FIRST ENTRY KII FIOS Z-THRD 12X100MM CTF73

## (undated) DEVICE — GLOVE PROTEXIS BLUE W/NEU-THERA 6.5  2D73EB65

## (undated) DEVICE — ORGANIZER MIO MEDICAL DEVICE 00711750

## (undated) DEVICE — SNARE LARIAT MULTI OVAL- 55X30MM HEX- 28X10MM DMD- 15X6MM

## (undated) DEVICE — ENDO TROCAR FIRST ENTRY KII FIOS Z-THRD 05X100MM CTF03

## (undated) DEVICE — DRSG GAUZE 4X4" 3033

## (undated) DEVICE — ESU HOLDER LAP INST DISP PURPLE LONG 330MM H-PRO-330

## (undated) DEVICE — INTR ENDOSCOPIC STENT FUSION OASIS 09.0FRX200CM

## (undated) DEVICE — BAG DECANTER STERILE WHITE DYNJDEC09

## (undated) DEVICE — SYR 50ML LL W/O NDL 309653

## (undated) DEVICE — SU VICRYL+ 0 27 UR6 VLT VCP603H

## (undated) DEVICE — SU MONOCRYL 4-0 PS-2 18" UND Y496G

## (undated) DEVICE — ENDO TROCAR SLEEVE KII Z-THREADED 05X100MM CTS02

## (undated) DEVICE — VIAL DECANTER STERILE WHITE DYNJDEC06

## (undated) DEVICE — TUBING C02 INSUFFLATION LAP FILTER HEATER 6198

## (undated) DEVICE — ESU GROUND PAD ADULT W/CORD E7507

## (undated) DEVICE — ANTIFOG SOLUTION SEE SHARP 150M TROCAR SWABS 30978

## (undated) DEVICE — ENDO CATH BALLOON EXTRACTOR PRO RX 09-12MM 4700

## (undated) DEVICE — BLADE KNIFE SURG 11 371111

## (undated) DEVICE — SPHINCTEROTOME BILIARY NDL KNIFE 5MM 5FR TL  4584

## (undated) DEVICE — ESU ENDO SCISSORS 5MM CVD 5DCS

## (undated) DEVICE — TIP CAUTERY L HOOK 36CM E377336C

## (undated) DEVICE — SU DERMABOND ADVANCED .7ML DNX12

## (undated) DEVICE — ENDO DEVICE LOCKING AND BIOPSY CAP M00545261

## (undated) DEVICE — TUBING SUCTION 6"X3/16" N56A

## (undated) DEVICE — RAD RX OMNIPAQUE 240MG/ML (50ML) CHARGE PER ML Y250

## (undated) DEVICE — SUCTION MANIFOLD NEPTUNE 2 SYS 4 PORT 0702-020-000

## (undated) DEVICE — ENDO DISSECTOR BLUNT 05MM  BTD05

## (undated) RX ORDER — FENTANYL CITRATE 50 UG/ML
INJECTION, SOLUTION INTRAMUSCULAR; INTRAVENOUS
Status: DISPENSED
Start: 2025-06-10

## (undated) RX ORDER — CEFAZOLIN SODIUM/WATER 2 G/20 ML
SYRINGE (ML) INTRAVENOUS
Status: DISPENSED
Start: 2025-06-09

## (undated) RX ORDER — ONDANSETRON 2 MG/ML
INJECTION INTRAMUSCULAR; INTRAVENOUS
Status: DISPENSED
Start: 2025-06-10

## (undated) RX ORDER — ONDANSETRON 2 MG/ML
INJECTION INTRAMUSCULAR; INTRAVENOUS
Status: DISPENSED
Start: 2025-06-09

## (undated) RX ORDER — FENTANYL CITRATE 50 UG/ML
INJECTION, SOLUTION INTRAMUSCULAR; INTRAVENOUS
Status: DISPENSED
Start: 2025-06-09

## (undated) RX ORDER — HYDROMORPHONE HYDROCHLORIDE 1 MG/ML
INJECTION, SOLUTION INTRAMUSCULAR; INTRAVENOUS; SUBCUTANEOUS
Status: DISPENSED
Start: 2025-06-10

## (undated) RX ORDER — BUPIVACAINE HYDROCHLORIDE AND EPINEPHRINE 5; 5 MG/ML; UG/ML
INJECTION, SOLUTION EPIDURAL; INTRACAUDAL; PERINEURAL
Status: DISPENSED
Start: 2025-06-10

## (undated) RX ORDER — DEXAMETHASONE SODIUM PHOSPHATE 4 MG/ML
INJECTION, SOLUTION INTRA-ARTICULAR; INTRALESIONAL; INTRAMUSCULAR; INTRAVENOUS; SOFT TISSUE
Status: DISPENSED
Start: 2025-06-10

## (undated) RX ORDER — PROPOFOL 10 MG/ML
INJECTION, EMULSION INTRAVENOUS
Status: DISPENSED
Start: 2025-06-10